# Patient Record
Sex: FEMALE | Race: WHITE | NOT HISPANIC OR LATINO | Employment: OTHER | URBAN - METROPOLITAN AREA
[De-identification: names, ages, dates, MRNs, and addresses within clinical notes are randomized per-mention and may not be internally consistent; named-entity substitution may affect disease eponyms.]

---

## 2015-08-04 LAB — CRC RECOMMENDATION EXT: NORMAL

## 2017-05-01 ENCOUNTER — HISTORICAL (OUTPATIENT)
Dept: ADMINISTRATIVE | Facility: HOSPITAL | Age: 62
End: 2017-05-01

## 2017-05-01 LAB
ABS NEUT (OLG): 4.07 X10(3)/MCL (ref 2.1–9.2)
ALBUMIN SERPL-MCNC: 3.3 GM/DL (ref 3.4–5)
ALBUMIN/GLOB SERPL: 0.8 {RATIO}
ALP SERPL-CCNC: 98 UNIT/L (ref 38–126)
ALT SERPL-CCNC: 32 UNIT/L (ref 12–78)
APPEARANCE, UA: ABNORMAL
AST SERPL-CCNC: 14 UNIT/L (ref 15–37)
BACTERIA SPEC CULT: ABNORMAL /HPF
BASOPHILS # BLD AUTO: 0 X10(3)/MCL (ref 0–0.2)
BASOPHILS NFR BLD AUTO: 1 %
BILIRUB SERPL-MCNC: 0.4 MG/DL (ref 0.2–1)
BILIRUB UR QL STRIP: NEGATIVE
BILIRUBIN DIRECT+TOT PNL SERPL-MCNC: 0.1 MG/DL (ref 0–0.2)
BILIRUBIN DIRECT+TOT PNL SERPL-MCNC: 0.3 MG/DL (ref 0–0.8)
BUN SERPL-MCNC: 12 MG/DL (ref 7–18)
CALCIUM SERPL-MCNC: 9 MG/DL (ref 8.5–10.1)
CHLORIDE SERPL-SCNC: 106 MMOL/L (ref 98–107)
CHOLEST SERPL-MCNC: 240 MG/DL (ref 0–200)
CHOLEST/HDLC SERPL: 4.4 {RATIO} (ref 0–4)
CO2 SERPL-SCNC: 30 MMOL/L (ref 21–32)
COLOR UR: YELLOW
CREAT SERPL-MCNC: 0.56 MG/DL (ref 0.55–1.02)
EOSINOPHIL # BLD AUTO: 0.2 X10(3)/MCL (ref 0–0.9)
EOSINOPHIL NFR BLD AUTO: 2 %
ERYTHROCYTE [DISTWIDTH] IN BLOOD BY AUTOMATED COUNT: 12.9 % (ref 11.5–17)
EST. AVERAGE GLUCOSE BLD GHB EST-MCNC: 85 MG/DL
GLOBULIN SER-MCNC: 4.1 GM/DL (ref 2.4–3.5)
GLUCOSE (UA): NEGATIVE
GLUCOSE SERPL-MCNC: 90 MG/DL (ref 74–106)
HBA1C MFR BLD: 4.6 % (ref 4.2–6.3)
HCT VFR BLD AUTO: 41.6 % (ref 37–47)
HDLC SERPL-MCNC: 54 MG/DL (ref 35–60)
HGB BLD-MCNC: 13.2 GM/DL (ref 12–16)
HGB UR QL STRIP: ABNORMAL
KETONES UR QL STRIP: NEGATIVE
LDLC SERPL CALC-MCNC: 143 MG/DL (ref 0–129)
LEUKOCYTE ESTERASE UR QL STRIP: ABNORMAL
LYMPHOCYTES # BLD AUTO: 3.4 X10(3)/MCL (ref 0.6–4.6)
LYMPHOCYTES NFR BLD AUTO: 41 %
MCH RBC QN AUTO: 28.9 PG (ref 27–31)
MCHC RBC AUTO-ENTMCNC: 31.7 GM/DL (ref 33–36)
MCV RBC AUTO: 91.2 FL (ref 80–94)
MONOCYTES # BLD AUTO: 0.6 X10(3)/MCL (ref 0.1–1.3)
MONOCYTES NFR BLD AUTO: 8 %
NEUTROPHILS # BLD AUTO: 4.07 X10(3)/MCL (ref 2.1–9.2)
NEUTROPHILS NFR BLD AUTO: 49 %
NITRITE UR QL STRIP: NEGATIVE
PH UR STRIP: 5.5 [PH] (ref 5–9)
PLATELET # BLD AUTO: 238 X10(3)/MCL (ref 130–400)
PMV BLD AUTO: 10.8 FL (ref 9.4–12.4)
POTASSIUM SERPL-SCNC: 4.1 MMOL/L (ref 3.5–5.1)
PROT SERPL-MCNC: 7.4 GM/DL (ref 6.4–8.2)
PROT UR QL STRIP: NEGATIVE
RBC # BLD AUTO: 4.56 X10(6)/MCL (ref 4.2–5.4)
RBC #/AREA URNS HPF: ABNORMAL /[HPF]
SODIUM SERPL-SCNC: 142 MMOL/L (ref 136–145)
SP GR UR STRIP: 1.02 (ref 1–1.03)
SQUAMOUS EPITHELIAL, UA: ABNORMAL
TRIGL SERPL-MCNC: 213 MG/DL (ref 30–150)
TSH SERPL-ACNC: 2.27 MIU/ML (ref 0.36–3.74)
UROBILINOGEN UR STRIP-ACNC: 0.2
VLDLC SERPL CALC-MCNC: 43 MG/DL
WBC # SPEC AUTO: 8.4 X10(3)/MCL (ref 4.5–11.5)
WBC #/AREA URNS HPF: 19 /HPF (ref 0–3)

## 2017-05-03 LAB — FINAL CULTURE: NO GROWTH

## 2017-11-06 ENCOUNTER — HISTORICAL (OUTPATIENT)
Dept: ADMINISTRATIVE | Facility: HOSPITAL | Age: 62
End: 2017-11-06

## 2017-11-06 LAB
ALBUMIN SERPL-MCNC: 3.4 GM/DL (ref 3.4–5)
ALBUMIN/GLOB SERPL: 0.9 {RATIO}
ALP SERPL-CCNC: 99 UNIT/L (ref 38–126)
ALT SERPL-CCNC: 27 UNIT/L (ref 12–78)
AST SERPL-CCNC: 14 UNIT/L (ref 15–37)
BILIRUB SERPL-MCNC: 0.4 MG/DL (ref 0.2–1)
BILIRUBIN DIRECT+TOT PNL SERPL-MCNC: 0.1 MG/DL (ref 0–0.2)
BILIRUBIN DIRECT+TOT PNL SERPL-MCNC: 0.3 MG/DL (ref 0–0.8)
BUN SERPL-MCNC: 13 MG/DL (ref 7–18)
CALCIUM SERPL-MCNC: 9.2 MG/DL (ref 8.5–10.1)
CHLORIDE SERPL-SCNC: 104 MMOL/L (ref 98–107)
CHOLEST SERPL-MCNC: 252 MG/DL (ref 0–200)
CHOLEST/HDLC SERPL: 4.3 {RATIO} (ref 0–4)
CO2 SERPL-SCNC: 27 MMOL/L (ref 21–32)
CREAT SERPL-MCNC: 0.62 MG/DL (ref 0.55–1.02)
GLOBULIN SER-MCNC: 3.9 GM/DL (ref 2.4–3.5)
GLUCOSE SERPL-MCNC: 89 MG/DL (ref 74–106)
HDLC SERPL-MCNC: 59 MG/DL (ref 35–60)
LDLC SERPL CALC-MCNC: 160 MG/DL (ref 0–129)
POTASSIUM SERPL-SCNC: 3.7 MMOL/L (ref 3.5–5.1)
PROT SERPL-MCNC: 7.3 GM/DL (ref 6.4–8.2)
SODIUM SERPL-SCNC: 141 MMOL/L (ref 136–145)
TRIGL SERPL-MCNC: 164 MG/DL (ref 30–150)
TSH SERPL-ACNC: 1.38 MIU/ML (ref 0.36–3.74)
VLDLC SERPL CALC-MCNC: 33 MG/DL

## 2018-04-26 ENCOUNTER — HISTORICAL (OUTPATIENT)
Dept: ADMINISTRATIVE | Facility: HOSPITAL | Age: 63
End: 2018-04-26

## 2018-04-26 LAB
ALBUMIN SERPL-MCNC: 3.1 GM/DL (ref 3.4–5)
ALBUMIN/GLOB SERPL: 0.8 RATIO (ref 1.1–2)
ALP SERPL-CCNC: 102 UNIT/L (ref 38–126)
ALT SERPL-CCNC: 23 UNIT/L (ref 12–78)
AST SERPL-CCNC: 14 UNIT/L (ref 15–37)
BILIRUB SERPL-MCNC: 0.8 MG/DL (ref 0.2–1)
BILIRUBIN DIRECT+TOT PNL SERPL-MCNC: 0.1 MG/DL (ref 0–0.5)
BILIRUBIN DIRECT+TOT PNL SERPL-MCNC: 0.7 MG/DL (ref 0–0.8)
BUN SERPL-MCNC: 11 MG/DL (ref 7–18)
CALCIUM SERPL-MCNC: 9 MG/DL (ref 8.5–10.1)
CHLORIDE SERPL-SCNC: 110 MMOL/L (ref 98–107)
CHOLEST SERPL-MCNC: 189 MG/DL (ref 0–200)
CHOLEST/HDLC SERPL: 3.7 {RATIO} (ref 0–4)
CO2 SERPL-SCNC: 30 MMOL/L (ref 21–32)
CREAT SERPL-MCNC: 0.55 MG/DL (ref 0.55–1.02)
GLOBULIN SER-MCNC: 4 GM/DL (ref 2.4–3.5)
GLUCOSE SERPL-MCNC: 85 MG/DL (ref 74–106)
HDLC SERPL-MCNC: 51 MG/DL (ref 35–60)
LDLC SERPL CALC-MCNC: 112 MG/DL (ref 0–129)
POTASSIUM SERPL-SCNC: 4 MMOL/L (ref 3.5–5.1)
PROT SERPL-MCNC: 7.1 GM/DL (ref 6.4–8.2)
SODIUM SERPL-SCNC: 144 MMOL/L (ref 136–145)
TRIGL SERPL-MCNC: 129 MG/DL (ref 30–150)
TSH SERPL-ACNC: 2.55 MIU/L (ref 0.36–3.74)
VLDLC SERPL CALC-MCNC: 26 MG/DL

## 2018-10-29 ENCOUNTER — HISTORICAL (OUTPATIENT)
Dept: ADMINISTRATIVE | Facility: HOSPITAL | Age: 63
End: 2018-10-29

## 2018-10-29 LAB
ABS NEUT (OLG): 4.53 X10(3)/MCL (ref 2.1–9.2)
ALBUMIN SERPL-MCNC: 3.1 GM/DL (ref 3.4–5)
ALBUMIN/GLOB SERPL: 0.8 {RATIO}
ALP SERPL-CCNC: 101 UNIT/L (ref 38–126)
ALT SERPL-CCNC: 26 UNIT/L (ref 12–78)
AST SERPL-CCNC: 14 UNIT/L (ref 15–37)
BASOPHILS # BLD AUTO: 0.1 X10(3)/MCL (ref 0–0.2)
BASOPHILS NFR BLD AUTO: 1 %
BILIRUB SERPL-MCNC: 0.4 MG/DL (ref 0.2–1)
BILIRUBIN DIRECT+TOT PNL SERPL-MCNC: 0.1 MG/DL (ref 0–0.2)
BILIRUBIN DIRECT+TOT PNL SERPL-MCNC: 0.3 MG/DL (ref 0–0.8)
BUN SERPL-MCNC: 12 MG/DL (ref 7–18)
CALCIUM SERPL-MCNC: 8.7 MG/DL (ref 8.5–10.1)
CHLORIDE SERPL-SCNC: 107 MMOL/L (ref 98–107)
CHOLEST SERPL-MCNC: 208 MG/DL (ref 0–200)
CHOLEST/HDLC SERPL: 4.4 {RATIO} (ref 0–4)
CO2 SERPL-SCNC: 28 MMOL/L (ref 21–32)
CREAT SERPL-MCNC: 0.63 MG/DL (ref 0.55–1.02)
EOSINOPHIL # BLD AUTO: 0.2 X10(3)/MCL (ref 0–0.9)
EOSINOPHIL NFR BLD AUTO: 2 %
ERYTHROCYTE [DISTWIDTH] IN BLOOD BY AUTOMATED COUNT: 13.2 % (ref 11.5–17)
GLOBULIN SER-MCNC: 4.1 GM/DL (ref 2.4–3.5)
GLUCOSE SERPL-MCNC: 94 MG/DL (ref 74–106)
HCT VFR BLD AUTO: 39.8 % (ref 37–47)
HDLC SERPL-MCNC: 47 MG/DL (ref 35–60)
HGB BLD-MCNC: 12.4 GM/DL (ref 12–16)
LDLC SERPL CALC-MCNC: 130 MG/DL (ref 0–129)
LYMPHOCYTES # BLD AUTO: 3 X10(3)/MCL (ref 0.6–4.6)
LYMPHOCYTES NFR BLD AUTO: 36 %
MCH RBC QN AUTO: 28.4 PG (ref 27–31)
MCHC RBC AUTO-ENTMCNC: 31.2 GM/DL (ref 33–36)
MCV RBC AUTO: 91.1 FL (ref 80–94)
MONOCYTES # BLD AUTO: 0.7 X10(3)/MCL (ref 0.1–1.3)
MONOCYTES NFR BLD AUTO: 8 %
NEUTROPHILS # BLD AUTO: 4.53 X10(3)/MCL (ref 2.1–9.2)
NEUTROPHILS NFR BLD AUTO: 53 %
PLATELET # BLD AUTO: 239 X10(3)/MCL (ref 130–400)
PMV BLD AUTO: 10.6 FL (ref 9.4–12.4)
POTASSIUM SERPL-SCNC: 4.2 MMOL/L (ref 3.5–5.1)
PROT SERPL-MCNC: 7.2 GM/DL (ref 6.4–8.2)
RBC # BLD AUTO: 4.37 X10(6)/MCL (ref 4.2–5.4)
SODIUM SERPL-SCNC: 143 MMOL/L (ref 136–145)
TRIGL SERPL-MCNC: 157 MG/DL (ref 30–150)
TSH SERPL-ACNC: 1.88 MIU/L (ref 0.36–3.74)
VLDLC SERPL CALC-MCNC: 31 MG/DL
WBC # SPEC AUTO: 8.5 X10(3)/MCL (ref 4.5–11.5)

## 2019-01-21 ENCOUNTER — HISTORICAL (OUTPATIENT)
Dept: ADMINISTRATIVE | Facility: HOSPITAL | Age: 64
End: 2019-01-21

## 2019-01-21 LAB
CRP SERPL HS-MCNC: 4.88 MG/L (ref 0–3)
ERYTHROCYTE [DISTWIDTH] IN BLOOD BY AUTOMATED COUNT: 13.2 % (ref 11.5–17)
ERYTHROCYTE [SEDIMENTATION RATE] IN BLOOD: 23 MM/HR (ref 0–20)
HCT VFR BLD AUTO: 42.9 % (ref 37–47)
HGB BLD-MCNC: 13.5 GM/DL (ref 12–16)
MCH RBC QN AUTO: 28.5 PG (ref 27–31)
MCHC RBC AUTO-ENTMCNC: 31.5 GM/DL (ref 33–36)
MCV RBC AUTO: 90.7 FL (ref 80–94)
PLATELET # BLD AUTO: 238 X10(3)/MCL (ref 130–400)
PMV BLD AUTO: 11 FL (ref 9.4–12.4)
RBC # BLD AUTO: 4.73 X10(6)/MCL (ref 4.2–5.4)
WBC # SPEC AUTO: 10.6 X10(3)/MCL (ref 4.5–11.5)

## 2019-04-09 ENCOUNTER — HISTORICAL (OUTPATIENT)
Dept: ADMINISTRATIVE | Facility: HOSPITAL | Age: 64
End: 2019-04-09

## 2019-04-09 LAB
ABS NEUT (OLG): 5.97 X10(3)/MCL (ref 2.1–9.2)
ALBUMIN SERPL-MCNC: 3.3 GM/DL (ref 3.4–5)
ALBUMIN/GLOB SERPL: 0.8 {RATIO}
ALP SERPL-CCNC: 107 UNIT/L (ref 38–126)
ALT SERPL-CCNC: 29 UNIT/L (ref 12–78)
AST SERPL-CCNC: 17 UNIT/L (ref 15–37)
BASOPHILS # BLD AUTO: 0.1 X10(3)/MCL (ref 0–0.2)
BASOPHILS NFR BLD AUTO: 1 %
BILIRUB SERPL-MCNC: 0.4 MG/DL (ref 0.2–1)
BILIRUBIN DIRECT+TOT PNL SERPL-MCNC: 0.1 MG/DL (ref 0–0.2)
BILIRUBIN DIRECT+TOT PNL SERPL-MCNC: 0.3 MG/DL (ref 0–0.8)
BUN SERPL-MCNC: 14 MG/DL (ref 7–18)
CALCIUM SERPL-MCNC: 9.2 MG/DL (ref 8.5–10.1)
CHLORIDE SERPL-SCNC: 106 MMOL/L (ref 98–107)
CO2 SERPL-SCNC: 31 MMOL/L (ref 21–32)
CREAT SERPL-MCNC: 0.69 MG/DL (ref 0.55–1.02)
EOSINOPHIL # BLD AUTO: 0.2 X10(3)/MCL (ref 0–0.9)
EOSINOPHIL NFR BLD AUTO: 2 %
ERYTHROCYTE [DISTWIDTH] IN BLOOD BY AUTOMATED COUNT: 13.3 % (ref 11.5–17)
GLOBULIN SER-MCNC: 4.1 GM/DL (ref 2.4–3.5)
GLUCOSE SERPL-MCNC: 80 MG/DL (ref 74–106)
HCT VFR BLD AUTO: 42.2 % (ref 37–47)
HGB BLD-MCNC: 13.2 GM/DL (ref 12–16)
LYMPHOCYTES # BLD AUTO: 3.8 X10(3)/MCL (ref 0.6–4.6)
LYMPHOCYTES NFR BLD AUTO: 35 %
MCH RBC QN AUTO: 28.6 PG (ref 27–31)
MCHC RBC AUTO-ENTMCNC: 31.3 GM/DL (ref 33–36)
MCV RBC AUTO: 91.3 FL (ref 80–94)
MONOCYTES # BLD AUTO: 0.8 X10(3)/MCL (ref 0.1–1.3)
MONOCYTES NFR BLD AUTO: 7 %
NEUTROPHILS # BLD AUTO: 5.97 X10(3)/MCL (ref 2.1–9.2)
NEUTROPHILS NFR BLD AUTO: 55 %
PLATELET # BLD AUTO: 258 X10(3)/MCL (ref 130–400)
PMV BLD AUTO: 10.7 FL (ref 9.4–12.4)
POTASSIUM SERPL-SCNC: 4.2 MMOL/L (ref 3.5–5.1)
PROT SERPL-MCNC: 7.4 GM/DL (ref 6.4–8.2)
RBC # BLD AUTO: 4.62 X10(6)/MCL (ref 4.2–5.4)
SODIUM SERPL-SCNC: 142 MMOL/L (ref 136–145)
WBC # SPEC AUTO: 10.8 X10(3)/MCL (ref 4.5–11.5)

## 2019-06-12 ENCOUNTER — HISTORICAL (OUTPATIENT)
Dept: ADMINISTRATIVE | Facility: HOSPITAL | Age: 64
End: 2019-06-12

## 2020-01-27 ENCOUNTER — HISTORICAL (OUTPATIENT)
Dept: ADMINISTRATIVE | Facility: HOSPITAL | Age: 65
End: 2020-01-27

## 2020-10-22 ENCOUNTER — HISTORICAL (OUTPATIENT)
Dept: ADMINISTRATIVE | Facility: HOSPITAL | Age: 65
End: 2020-10-22

## 2020-10-26 ENCOUNTER — HISTORICAL (OUTPATIENT)
Dept: ADMINISTRATIVE | Facility: HOSPITAL | Age: 65
End: 2020-10-26

## 2020-11-02 ENCOUNTER — HISTORICAL (OUTPATIENT)
Dept: ADMINISTRATIVE | Facility: HOSPITAL | Age: 65
End: 2020-11-02

## 2020-12-02 ENCOUNTER — HISTORICAL (OUTPATIENT)
Dept: ADMINISTRATIVE | Facility: HOSPITAL | Age: 65
End: 2020-12-02

## 2021-01-13 ENCOUNTER — HISTORICAL (OUTPATIENT)
Dept: ADMINISTRATIVE | Facility: HOSPITAL | Age: 66
End: 2021-01-13

## 2021-01-15 ENCOUNTER — HISTORICAL (OUTPATIENT)
Dept: LAB | Facility: HOSPITAL | Age: 66
End: 2021-01-15

## 2021-01-15 LAB
ABS NEUT (OLG): 5.55 X10(3)/MCL (ref 2.1–9.2)
ALBUMIN SERPL-MCNC: 3.5 GM/DL (ref 3.4–4.8)
ALBUMIN/GLOB SERPL: 0.9 RATIO (ref 1.1–2)
ALP SERPL-CCNC: 93 UNIT/L (ref 40–150)
ALT SERPL-CCNC: 15 UNIT/L (ref 0–55)
AST SERPL-CCNC: 15 UNIT/L (ref 5–34)
BASOPHILS # BLD AUTO: 0.06 X10(3)/MCL (ref 0–0.2)
BASOPHILS NFR BLD AUTO: 0.6 % (ref 0–1)
BILIRUB SERPL-MCNC: 0.4 MG/DL (ref 0.2–1.2)
BILIRUBIN DIRECT+TOT PNL SERPL-MCNC: 0.2 MG/DL (ref 0–0.5)
BILIRUBIN DIRECT+TOT PNL SERPL-MCNC: 0.2 MG/DL (ref 0–0.8)
BUN SERPL-MCNC: 11.2 MG/DL (ref 9.8–20.1)
CALCIUM SERPL-MCNC: 9.3 MG/DL (ref 8.4–10.2)
CHLORIDE SERPL-SCNC: 104 MMOL/L (ref 98–107)
CHOLEST SERPL-MCNC: 224 MG/DL
CHOLEST/HDLC SERPL: 4 {RATIO} (ref 0–5)
CO2 SERPL-SCNC: 28 MMOL/L (ref 23–31)
CREAT SERPL-MCNC: 0.75 MG/DL (ref 0.57–1.11)
EOSINOPHIL # BLD AUTO: 0.14 X10(3)/MCL (ref 0–0.9)
EOSINOPHIL NFR BLD AUTO: 1.4 % (ref 0–6.4)
ERYTHROCYTE [DISTWIDTH] IN BLOOD BY AUTOMATED COUNT: 13.3 % (ref 11.5–17)
GLOBULIN SER-MCNC: 4 GM/DL (ref 2.4–3.5)
GLUCOSE SERPL-MCNC: 98 MG/DL (ref 82–115)
HCT VFR BLD AUTO: 42.6 % (ref 37–47)
HDLC SERPL-MCNC: 53 MG/DL (ref 40–60)
HGB BLD-MCNC: 13.7 GM/DL (ref 12–16)
IMM GRANULOCYTES # BLD AUTO: 0.02 10*3/UL (ref 0–0.02)
IMM GRANULOCYTES NFR BLD AUTO: 0.2 % (ref 0–0.43)
LDLC SERPL CALC-MCNC: 149 MG/DL (ref 50–140)
LYMPHOCYTES # BLD AUTO: 3.24 X10(3)/MCL (ref 0.6–4.6)
LYMPHOCYTES NFR BLD AUTO: 33.3 % (ref 16–44)
MCH RBC QN AUTO: 28.8 PG (ref 27–31)
MCHC RBC AUTO-ENTMCNC: 32.2 GM/DL (ref 33–36)
MCV RBC AUTO: 89.5 FL (ref 80–94)
MONOCYTES # BLD AUTO: 0.71 X10(3)/MCL (ref 0.1–1.3)
MONOCYTES NFR BLD AUTO: 7.3 % (ref 4–12.1)
NEUTROPHILS # BLD AUTO: 5.55 X10(3)/MCL (ref 2.1–9.2)
NEUTROPHILS NFR BLD AUTO: 57.2 % (ref 43–73)
NRBC BLD AUTO-RTO: 0 % (ref 0–0.2)
PLATELET # BLD AUTO: 262 X10(3)/MCL (ref 130–400)
PMV BLD AUTO: 10.9 FL (ref 7.4–10.4)
POTASSIUM SERPL-SCNC: 4 MMOL/L (ref 3.5–5.1)
PROT SERPL-MCNC: 7.5 GM/DL (ref 5.8–7.6)
RBC # BLD AUTO: 4.76 X10(6)/MCL (ref 4.2–5.4)
SODIUM SERPL-SCNC: 142 MMOL/L (ref 136–145)
TRIGL SERPL-MCNC: 111 MG/DL (ref 0–150)
TSH SERPL-ACNC: 4.7 UIU/ML (ref 0.35–4.94)
VLDLC SERPL CALC-MCNC: 22 MG/DL
WBC # SPEC AUTO: 9.7 X10(3)/MCL (ref 4.5–11.5)

## 2021-06-11 ENCOUNTER — HISTORICAL (OUTPATIENT)
Dept: ADMINISTRATIVE | Facility: HOSPITAL | Age: 66
End: 2021-06-11

## 2021-06-24 ENCOUNTER — HISTORICAL (OUTPATIENT)
Dept: BARIATRICS | Facility: HOSPITAL | Age: 66
End: 2021-06-24

## 2021-07-13 ENCOUNTER — HISTORICAL (OUTPATIENT)
Dept: LAB | Facility: HOSPITAL | Age: 66
End: 2021-07-13

## 2021-07-13 LAB
ABS NEUT (OLG): 4.79 X10(3)/MCL (ref 2.1–9.2)
ALBUMIN SERPL-MCNC: 3.4 GM/DL (ref 3.4–4.8)
ALBUMIN/GLOB SERPL: 0.8 RATIO (ref 1.1–2)
ALP SERPL-CCNC: 87 UNIT/L (ref 40–150)
ALT SERPL-CCNC: 22 UNIT/L (ref 0–55)
AST SERPL-CCNC: 18 UNIT/L (ref 5–34)
BASOPHILS # BLD AUTO: 0.08 X10(3)/MCL (ref 0–0.2)
BASOPHILS NFR BLD AUTO: 0.9 % (ref 0–1)
BILIRUB SERPL-MCNC: 0.3 MG/DL (ref 0.2–1.2)
BILIRUBIN DIRECT+TOT PNL SERPL-MCNC: <0.1 MG/DL (ref 0–0.5)
BILIRUBIN DIRECT+TOT PNL SERPL-MCNC: >0.2 MG/DL (ref 0–0.8)
BUN SERPL-MCNC: 11 MG/DL (ref 9.8–20.1)
CALCIUM SERPL-MCNC: 9.5 MG/DL (ref 8.4–10.2)
CHLORIDE SERPL-SCNC: 105 MMOL/L (ref 98–107)
CHOLEST SERPL-MCNC: 194 MG/DL
CHOLEST/HDLC SERPL: 5 {RATIO} (ref 0–5)
CO2 SERPL-SCNC: 27 MMOL/L (ref 23–31)
CREAT SERPL-MCNC: 0.76 MG/DL (ref 0.57–1.11)
EOSINOPHIL # BLD AUTO: 0.18 X10(3)/MCL (ref 0–0.9)
EOSINOPHIL NFR BLD AUTO: 1.9 % (ref 0–6.4)
ERYTHROCYTE [DISTWIDTH] IN BLOOD BY AUTOMATED COUNT: 13 % (ref 11.5–17)
GLOBULIN SER-MCNC: 4.3 GM/DL (ref 2.4–3.5)
GLUCOSE SERPL-MCNC: 88 MG/DL (ref 82–115)
HCT VFR BLD AUTO: 42 % (ref 37–47)
HDLC SERPL-MCNC: 40 MG/DL (ref 40–60)
HGB BLD-MCNC: 13.4 GM/DL (ref 12–16)
IMM GRANULOCYTES # BLD AUTO: 0.02 10*3/UL (ref 0–0.02)
IMM GRANULOCYTES NFR BLD AUTO: 0.2 % (ref 0–0.43)
LDLC SERPL CALC-MCNC: 99 MG/DL (ref 50–140)
LYMPHOCYTES # BLD AUTO: 3.53 X10(3)/MCL (ref 0.6–4.6)
LYMPHOCYTES NFR BLD AUTO: 37.9 % (ref 16–44)
MCH RBC QN AUTO: 28.9 PG (ref 27–31)
MCHC RBC AUTO-ENTMCNC: 31.9 GM/DL (ref 33–36)
MCV RBC AUTO: 90.7 FL (ref 80–94)
MONOCYTES # BLD AUTO: 0.72 X10(3)/MCL (ref 0.1–1.3)
MONOCYTES NFR BLD AUTO: 7.7 % (ref 4–12.1)
NEUTROPHILS # BLD AUTO: 4.79 X10(3)/MCL (ref 2.1–9.2)
NEUTROPHILS NFR BLD AUTO: 51.4 % (ref 43–73)
NRBC BLD AUTO-RTO: 0 % (ref 0–0.2)
PLATELET # BLD AUTO: 264 X10(3)/MCL (ref 130–400)
PMV BLD AUTO: 11.2 FL (ref 7.4–10.4)
POTASSIUM SERPL-SCNC: 4 MMOL/L (ref 3.5–5.1)
PROT SERPL-MCNC: 7.7 GM/DL (ref 5.8–7.6)
RBC # BLD AUTO: 4.63 X10(6)/MCL (ref 4.2–5.4)
SODIUM SERPL-SCNC: 141 MMOL/L (ref 136–145)
TRIGL SERPL-MCNC: 275 MG/DL (ref 0–150)
TSH SERPL-ACNC: 3.32 UIU/ML (ref 0.35–4.94)
VLDLC SERPL CALC-MCNC: 55 MG/DL
WBC # SPEC AUTO: 9.3 X10(3)/MCL (ref 4.5–11.5)

## 2021-07-28 ENCOUNTER — HISTORICAL (OUTPATIENT)
Dept: BARIATRICS | Facility: HOSPITAL | Age: 66
End: 2021-07-28

## 2021-08-04 ENCOUNTER — HISTORICAL (OUTPATIENT)
Dept: ADMINISTRATIVE | Facility: HOSPITAL | Age: 66
End: 2021-08-04

## 2021-08-19 ENCOUNTER — HISTORICAL (OUTPATIENT)
Dept: BARIATRICS | Facility: HOSPITAL | Age: 66
End: 2021-08-19

## 2021-08-23 ENCOUNTER — HISTORICAL (OUTPATIENT)
Dept: RADIOLOGY | Facility: HOSPITAL | Age: 66
End: 2021-08-23

## 2021-09-16 ENCOUNTER — HISTORICAL (OUTPATIENT)
Dept: BARIATRICS | Facility: HOSPITAL | Age: 66
End: 2021-09-16

## 2021-10-11 ENCOUNTER — HISTORICAL (OUTPATIENT)
Dept: ADMINISTRATIVE | Facility: HOSPITAL | Age: 66
End: 2021-10-11

## 2022-01-04 ENCOUNTER — HISTORICAL (OUTPATIENT)
Dept: BARIATRICS | Facility: HOSPITAL | Age: 67
End: 2022-01-04

## 2022-01-10 ENCOUNTER — HISTORICAL (OUTPATIENT)
Dept: LAB | Facility: HOSPITAL | Age: 67
End: 2022-01-10

## 2022-01-10 LAB
ABS NEUT (OLG): 5.75 X10(3)/MCL (ref 2.1–9.2)
ALBUMIN SERPL-MCNC: 3.6 GM/DL (ref 3.4–4.8)
ALBUMIN/GLOB SERPL: 0.9 RATIO (ref 1.1–2)
ALP SERPL-CCNC: 91 UNIT/L (ref 40–150)
ALT SERPL-CCNC: 15 UNIT/L (ref 0–55)
APTT PPP: 34.7 SEC (ref 23.4–34.9)
AST SERPL-CCNC: 17 UNIT/L (ref 5–34)
BILIRUB SERPL-MCNC: 0.5 MG/DL (ref 0.2–1.2)
BILIRUBIN DIRECT+TOT PNL SERPL-MCNC: 0.2 MG/DL (ref 0–0.5)
BILIRUBIN DIRECT+TOT PNL SERPL-MCNC: 0.3 MG/DL (ref 0–0.8)
BUN SERPL-MCNC: 10.2 MG/DL (ref 9.8–20.1)
CALCIUM SERPL-MCNC: 9.4 MG/DL (ref 8.4–10.2)
CHLORIDE SERPL-SCNC: 104 MMOL/L (ref 98–107)
CO2 SERPL-SCNC: 30 MMOL/L (ref 23–31)
CREAT SERPL-MCNC: 0.68 MG/DL (ref 0.57–1.11)
ERYTHROCYTE [DISTWIDTH] IN BLOOD BY AUTOMATED COUNT: 13.2 % (ref 11.5–17)
GLOBULIN SER-MCNC: 3.9 GM/DL (ref 2.4–3.5)
GLUCOSE SERPL-MCNC: 85 MG/DL (ref 82–115)
HCT VFR BLD AUTO: 43.2 % (ref 37–47)
HGB BLD-MCNC: 13.7 GM/DL (ref 12–16)
MCH RBC QN AUTO: 28.8 PG (ref 27–31)
MCHC RBC AUTO-ENTMCNC: 31.7 GM/DL (ref 33–36)
MCV RBC AUTO: 90.8 FL (ref 80–94)
NRBC BLD AUTO-RTO: 0 % (ref 0–0.2)
PLATELET # BLD AUTO: 268 X10(3)/MCL (ref 130–400)
PMV BLD AUTO: 10.7 FL (ref 7.4–10.4)
POTASSIUM SERPL-SCNC: 4.1 MMOL/L (ref 3.5–5.1)
PROT SERPL-MCNC: 7.5 GM/DL (ref 5.8–7.6)
RBC # BLD AUTO: 4.76 X10(6)/MCL (ref 4.2–5.4)
SODIUM SERPL-SCNC: 143 MMOL/L (ref 136–145)
WBC # SPEC AUTO: 10.4 X10(3)/MCL (ref 4.5–11.5)

## 2022-01-19 ENCOUNTER — HISTORICAL (OUTPATIENT)
Dept: BARIATRICS | Facility: HOSPITAL | Age: 67
End: 2022-01-19

## 2022-01-20 ENCOUNTER — HISTORICAL (OUTPATIENT)
Dept: PREADMISSION TESTING | Facility: HOSPITAL | Age: 67
End: 2022-01-20

## 2022-01-20 LAB — SARS-COV-2 RNA RESP QL NAA+PROBE: NOT DETECTED

## 2022-02-07 ENCOUNTER — HISTORICAL (OUTPATIENT)
Dept: BARIATRICS | Facility: HOSPITAL | Age: 67
End: 2022-02-07

## 2022-03-22 ENCOUNTER — HISTORICAL (OUTPATIENT)
Dept: BARIATRICS | Facility: HOSPITAL | Age: 67
End: 2022-03-22

## 2022-04-10 ENCOUNTER — HISTORICAL (OUTPATIENT)
Dept: ADMINISTRATIVE | Facility: HOSPITAL | Age: 67
End: 2022-04-10
Payer: MEDICARE

## 2022-04-29 VITALS
DIASTOLIC BLOOD PRESSURE: 77 MMHG | HEIGHT: 59 IN | BODY MASS INDEX: 43.75 KG/M2 | OXYGEN SATURATION: 97 % | SYSTOLIC BLOOD PRESSURE: 138 MMHG | WEIGHT: 217 LBS

## 2022-04-29 NOTE — H&P
Patient:   Varsha Rg            MRN: [id number]            FIN: [id number]               Age:   66 years     Sex:  Female     :  1955   Associated Diagnoses:   GERD (gastroesophageal reflux disease)   Author:   Jake Elmore MD      Basic Information   Admit information:  Presents for EGD to evaluate anatomy  c/o GERD .       Health Status   Allergies:    Allergic Reactions (Selected)  Severity Not Documented  Levaquin- Tachycardia.  Lortab- No reactions were documented.  Penicillins- No reactions were documented.  Zithromax- No reactions were documented.   Current medications:    Medications (1) Active  Scheduled: (0)  Continuous: (1)  Lactated Ringers Injection intravenous solution 1,000 mL  1,000 mL, IV, 75 mL/hr  PRN: (0)        Histories   Past Medical History:    No active or resolved past medical history items have been selected or recorded.   Social History        Social & Psychosocial Habits    Alcohol  2015  Use: Current    Employment/School  2015  Status: Retired    Exercise  2015  Duration (average number of minutes): 0    Home/Environment  2015  Lives with: Spouse    Nutrition/Health  2015  Type of diet: Calorie restricted    Sexual  2015  Sexually active: Yes    Substance Use  2015  Use: Never    Tobacco  2021  Use: Never (less than 100 in l    Patient Wants Consult For Cessation Counseling N/A    2021  Use: Never (less than 100 in l    Patient Wants Consult For Cessation Counseling N/A    Abuse/Neglect  2021  SHX Any signs of abuse or neglect No    Feels unsafe at home: No    Safe place to go: Yes    2021  SHX Any signs of abuse or neglect No  .        Physical Examination   General:  Alert and oriented.    HENT:  Normocephalic.    Neck:  Supple.    Respiratory:  Lungs are clear to auscultation.    Cardiovascular:  Normal rate.    Gastrointestinal:       Abdomen: Obese.    Genitourinary:  No costovertebral  angle tenderness.    Musculoskeletal:  Normal range of motion.    Integumentary:  Warm.    Neurologic:  Alert.       Review / Management   Results review:     No qualifying data available.       Impression and Plan   Diagnosis     GERD (gastroesophageal reflux disease) (GJJ05-JK K21.9).       EGD

## 2022-04-29 NOTE — OP NOTE
Patient:   Varsha Rg            MRN: [id number]            FIN: [id number]               Age:   66 years     Sex:  Female     :  1955   Associated Diagnoses:   HEARTBURN   Author:   Jake Elmore MD      Operative Note   Operative Information   Procedures Performed: Procedure Code   78038- EGD FLEX TRANSORAL DX W/BRUSHING/WASHING (None) on 2021 at 66 Years.  Comments:  2021 7:34 CDT - Jose NGUYỄN, Jaci MOTT  auto-populated from documented surgical case, Esophagogastroduodenoscopy.     Preoperative Diagnosis: HEARTBURN ([id number] R12).     Postoperative Diagnosis: HEARTBURN ([id number]-RF R12).     Surgeon: Jake Elmore MD.     Anesthesia: MAC.     Description of Procedure/Findings/    Complications: Patient was taken to the OR.  The patient's throat was aneshtetized.  MAC was utilized for anesthesia.   was easily passed.  Findings were as follows:  -Oropharynx: normal  -Airway: normal  -Esophagus: normal  -Stomach: normal mucosa   -Duodenum: normal.     Esimated blood loss: No blood loss.     Complications: None.

## 2022-05-02 ENCOUNTER — TELEPHONE (OUTPATIENT)
Dept: INTERNAL MEDICINE | Facility: CLINIC | Age: 67
End: 2022-05-02
Payer: MEDICARE

## 2022-05-02 DIAGNOSIS — R30.0 DYSURIA: Primary | ICD-10-CM

## 2022-05-03 ENCOUNTER — TELEPHONE (OUTPATIENT)
Dept: INTERNAL MEDICINE | Facility: CLINIC | Age: 67
End: 2022-05-03

## 2022-05-03 ENCOUNTER — LAB VISIT (OUTPATIENT)
Dept: LAB | Facility: HOSPITAL | Age: 67
End: 2022-05-03
Attending: INTERNAL MEDICINE
Payer: MEDICARE

## 2022-05-03 DIAGNOSIS — R30.0 DYSURIA: ICD-10-CM

## 2022-05-03 DIAGNOSIS — R30.0 DYSURIA: Primary | ICD-10-CM

## 2022-05-03 LAB
APPEARANCE UR: ABNORMAL
BACTERIA #/AREA URNS AUTO: ABNORMAL /HPF
BILIRUB UR QL STRIP.AUTO: ABNORMAL
COLOR UR AUTO: ABNORMAL
GLUCOSE UR QL STRIP.AUTO: ABNORMAL
KETONES UR QL STRIP.AUTO: ABNORMAL
LEUKOCYTE ESTERASE UR QL STRIP.AUTO: ABNORMAL
NITRITE UR QL STRIP.AUTO: ABNORMAL
PH UR STRIP.AUTO: 5 [PH]
PROT UR QL STRIP.AUTO: ABNORMAL
RBC #/AREA URNS AUTO: 10 /HPF
RBC UR QL AUTO: ABNORMAL
SP GR UR STRIP.AUTO: 1.02 (ref 1–1.03)
SQUAMOUS #/AREA URNS AUTO: ABNORMAL /LPF
UROBILINOGEN UR STRIP-ACNC: ABNORMAL
WBC #/AREA URNS AUTO: 200 /HPF

## 2022-05-03 PROCEDURE — 81003 URINALYSIS AUTO W/O SCOPE: CPT

## 2022-05-03 PROCEDURE — 81015 MICROSCOPIC EXAM OF URINE: CPT | Mod: XB

## 2022-05-04 NOTE — HISTORICAL [facility]ER
This is a historical note converted from Reyna. Formatting and pictures may have been removed.  Please reference Reyna for original formatting and attached multimedia. Chief Complaint  Pt is here for left wrist fx/fell @ [place] & went to [facility] ER 10/21/20  History of Present Illness  Patient is a 65-year-old female that ambulates with a walker. ?She?tripped and fell onto an outstretched left hand in the [employer] lot yesterday.? She went to the emergency room where she was?treated?with?a splint for her left?acute distal radius fracture. ?She was given follow-up with me this morning.? She denies any numbness or tingling. ?She denies any other injury. ?She denies any dizziness or loss of consciousness.  Review of Systems  Systemic: No fever, no chills, and no recent weight change.  Head: No headache - frequent.  Eyes: No vision problems.  Otolarnygeal: No hearing loss, no earache, no epistaxis, no hoarseness, and no tooth pain. Gums normal.  Cardiovascular: No chest pain or discomfort and no palpitations.  Pulmonary: No pulmonary symptoms - difficulty sleeping, no dyspnea, and cough not worse in the morning.  Gastrointestinal: Appetite not decreased. No dysphagia and no constant eructation. No nausea, no vomiting, no abdominal pain, no hematochezia, and no loose/mushy stools - frequent. No constipation - frequent.  Genitourinary: No genitourinary symptoms - Getting up every night to urinate and no increase in urinary frequency. No urinary hesitancy. No urinary loss of control - difficulty stopping urination and no burning sensation during urination.  Musculoskeletal: No calf muscle cramps and no localized soft tissue swelling of the ankle.  Neurological: No fainting and no convulsions.  Psychological: Not feeling nervous tension, not feeling nervous from exhaustion, and no depression.  Skin: No rash. Previous history of no ulcers.  ?  Physical Exam  Vitals & Measurements  T:?96.8? ?F (Oral)?  HR:?74(Peripheral)? BP:?136/69?  HT:?148.00?cm? WT:?98.600?kg? BMI:?45.01?  Left upper extremity exam:  Normal capillary refill of less than 3 seconds  Normal sensation  No numbness  Intact?flexion and extension of all 5 fingers  Tenderness over the distal radius?and distal ulna  No hand tenderness  Mild swelling  Patient is in a splint that is well fitting  No elbow or shoulder tenderness  Normal range of motion of the elbow and shoulder  Radiographs?of the?left wrist taken today show?a left distal radius fracture with acceptable?alignment.? The ulnar styloid fracture appears to be an old injury.  Assessment/Plan  1.?Distal radius fracture, left?S52.502A  ?Plan as of now is for nonoperative treatment of her left distal radius fracture because the alignment?of the fracture today is acceptable. ?I will see her back on Monday for repeat radiographs. ?If?there is no?change for the worse in position of the?fracture?then the plan will be for cast application. ?If?fracture?displaces further?then?we will have a surgical discussion.  Ordered:  Clinic Follow up, *Est. 10/26/20 3:00:00 CDT, Order for future visit, Distal radius fracture, left, [facility]  Office/Outpatient Visit Level 3 Established [id number] [facility], Distal radius fracture, left, [facility], 10/22/20 9:09:00 CDT  XR Wrist Left Minimum 3 Views, Routine, *Est. 10/26/20 3:00:00 CDT, None, Ambulatory, Rad Type, Order for future visit, Distal radius fracture, left, Not Scheduled, *Est. 10/26/20 3:00:00 CDT  ?  Orders:  XR Wrist Left Minimum 3 Views, Routine, 10/22/20 8:58:00 CDT, None, Ambulatory, Rad Type, Wrist fracture, left, Not Scheduled, 10/22/20 8:58:00 CDT  Referrals  Clinic Follow up, *Est. 10/26/20 3:00:00 CDT, Order for future visit, Distal radius fracture, left, [facility]   Problem List/Past Medical History  Ongoing  Distal radius fracture, left  Essential hypertension(  Confirmed  )  History of arthroplasty of right knee  Hypothyroidism(   Confirmed  )  Morbid obesity  Obesity(  Probable Diagnosis  )  Osteoarthritis  Osteoarthritis of left knee  Patellar tendonitis of right knee  Squamous cell carcinoma  Historical  No qualifying data  Procedure/Surgical History  Release of right trigger finger (11/06/2019)  Release of left trigger finger (09/23/2019)  Arthroscopy of knee with medial meniscus repair (03/17/2017)  Excision of squamous cell carcinoma (01/17/2017)  Pap smear and HPV cotesting (08/01/2016)  Colonoscopy (01/01/2015)  Arthroscopy of knee with medial and lateral meniscus repair  Excision of squamous cell carcinoma   Medications  albuterol 2.5 mg/3 mL (0.083%) inhalation solution  furosemide 20 mg oral tablet, See Instructions  irbesartan 75 mg oral tablet, 75 mg= 1 tab(s), Oral, Daily,? ?Still taking, not as prescribed: every 3 days.  KlonoPIN 2 mg oral tablet, 4 mg= 2 tab(s), Oral, Once a day (at bedtime), 2 refills  Lotrisone 1%-0.05% topical cream, 1 julian, TOP, BID  meloxicam 7.5 mg oral tablet, 7.5 mg= 1 tab(s), Oral, Daily,? ?Not Taking, Completed Rx: Last Dose Date/Time Unknown. done  Mobic 7.5 mg oral tablet, 7.5 mg= 1 tab(s), Oral, BID, 5 refills,? ?Not taking  Mobic 7.5 mg oral tablet, 7.5 mg= 1 tab(s), Oral, Daily,? ?Not taking  Protonix 40 mg ORAL enteric coated tablet, 40 mg= 1 tab(s), Oral, Daily, 6 refills  Synthroid 88 mcg (0.088 mg) oral tablet, See Instructions, 2 refills,? ?Not taking  traMADol 50 mg oral tablet, 50 mg= 1 tab(s), Oral, q8hr, PRN  VIT D 50,000 IU D2 (ERGO) CAPS (RX), See Instructions, 3 refills  Voltaren 1% topical gel, 2 gm, TOP, QID, 5 refills  Allergies  Levaquin?(TACHYCARDIA)  Lortab  Zithromax  penicillins  Social History  Abuse/Neglect  No, No, Yes, 10/22/2020  Alcohol  Current, 04/22/2015  Employment/School  Retired, 04/22/2015  Exercise  Exercise duration: 0., 04/22/2015  Home/Environment  Lives with Spouse., 04/22/2015  Nutrition/Health  Calorie restricted, 04/22/2015  Sexual  Sexually active:  Yes., 04/22/2015  Substance Use  Never, 04/22/2015  Tobacco  Never (less than 100 in lifetime), N/A, 10/22/2020  Family History  Unable to obtain family history  Health Maintenance  Health Maintenance  ???Pending?(in the next year)  ??? ??OverDue  ??? ? ? ?Tetanus Vaccine due??01/03/18??and every 10??year(s)  ??? ? ? ?Influenza Vaccine due??10/01/19??and every 1??day(s)  ??? ? ? ?Bone Density Screening due??12/18/19??and every 1??day(s)  ??? ? ? ?Advance Directive due??01/02/20??and every 1??year(s)  ??? ? ? ?Cognitive Screening due??01/02/20??and every 1??year(s)  ??? ? ? ?Aspirin Therapy for CVD Prevention due??04/09/20??and every 1??year(s)  ??? ? ? ?Hypertension Management-Education due??04/09/20??and every 1??year(s)  ??? ? ? ?ADL Screening due??04/09/20??and every 1??year(s)  ??? ??Due?  ??? ? ? ?Coronary Artery Disease Maintenance-Antiplatelet Agent Prescribed due??10/22/20??and every?  ??? ? ? ?Coronary Artery Disease Maintenance-Lipid Lowering Therapy due??10/22/20??and every?  ??? ? ? ?Pneumococcal Vaccine due??10/22/20??and every?  ??? ? ? ?Zoster Vaccine due??10/22/20??and every?  ??? ??Near Due?  ??? ? ? ?Medicare Annual Wellness Exam near due??12/17/20??and every 1??year(s)  ??? ??Due In Future?  ??? ? ? ?Hypertension Management-BMP not due until??12/12/20??and every 1??year(s)  ??? ? ? ?Obesity Screening not due until??01/01/21??and every 1??year(s)  ??? ? ? ?Alcohol Misuse Screening not due until??01/02/21??and every 1??year(s)  ??? ? ? ?Fall Risk Assessment not due until??01/02/21??and every 1??year(s)  ??? ? ? ?Functional Assessment not due until??01/02/21??and every 1??year(s)  ??? ? ? ?Depression Screening not due until??08/28/21??and every 1??year(s)  ??? ? ? ?Breast Cancer Screening not due until??09/16/21??and every 2??year(s)  ???Satisfied?(in the past 1 year)  ??? ??Satisfied?  ??? ? ? ?Alcohol Misuse Screening on??07/06/20.??Satisfied by Blanca Merchant LPN  ??? ? ? ?Blood Pressure Screening  on??10/22/20.??Satisfied by Blanca Merchant LPN  ??? ? ? ?Body Mass Index Check on??10/22/20.??Satisfied by Blanca Merchant LPN  ??? ? ? ?Bone Density Screening on??12/17/19.??Satisfied by Ursula Willis NP  ??? ? ? ?Depression Screening on??08/28/20.??Satisfied by Blanca Merchant LPN  ??? ? ? ?Diabetes Screening on??05/15/20.??Satisfied by Dylan Bravo.  ??? ? ? ?Fall Risk Assessment on??10/22/20.??Satisfied by Blanca Merchant LPN  ??? ? ? ?Functional Assessment on??08/28/20.??Satisfied by Blanca Merchant LPN  ??? ? ? ?Hypertension Management-Blood Pressure on??10/22/20.??Satisfied by Blanca Merchant LPN  ??? ? ? ?Lipid Screening on??05/15/20.??Satisfied by Dylan Bravo  ??? ? ? ?Medicare Annual Wellness Exam on??12/17/19.??Satisfied by Ursula Willis NP  ??? ? ? ?Obesity Screening on??10/22/20.??Satisfied by Blanca Merchant LPN  ?

## 2022-05-04 NOTE — HISTORICAL [facility]
This is a historical note converted from Reyna. Formatting and pictures may have been removed.  Please reference Reyna for original formatting and attached multimedia. Chief Complaint  6 wk f/u LEFT distal radius fracture  History of Present Illness  Patient is doing?great with no pain in her left wrist.? She denies any swelling or numbness or tingling.  Physical Exam  Vitals & Measurements  T:?36.5? ?C (Oral)? HR:?58(Peripheral)? BP:?136/77?  HT:?148.00?cm? WT:?98.600?kg? BMI:?45.01?  Left wrist exam:  2+ radial pulse  Full range of motion  No tenderness  No swelling, no redness, no increased heat  ?  Radiographs reveal healed fracture of the left distal radius  Assessment/Plan  1.?Left wrist fracture?S62.102A  ?Resume regular activities   Problem List/Past Medical History  Ongoing  Essential hypertension(  Confirmed  )  History of arthroplasty of right knee  Hypothyroidism(  Confirmed  )  Morbid obesity  Obesity(  Probable Diagnosis  )  Osteoarthritis  Osteoarthritis of left knee  Patellar tendonitis of right knee  Right knee pain  Squamous cell carcinoma  Historical  No qualifying data  Procedure/Surgical History  Release of right trigger finger (11/06/2019)  Release of left trigger finger (09/23/2019)  Arthroscopy of knee with medial meniscus repair (03/17/2017)  Excision of squamous cell carcinoma (01/17/2017)  Pap smear and HPV cotesting (08/01/2016)  Colonoscopy (01/01/2015)  Arthroscopy of knee with medial and lateral meniscus repair  Excision of squamous cell carcinoma   Medications  albuterol 2.5 mg/3 mL (0.083%) inhalation solution  furosemide 20 mg oral tablet, See Instructions  irbesartan 75 mg oral tablet, 75 mg= 1 tab(s), Oral, Daily,? ?Still taking, not as prescribed: every 3 days.  KlonoPIN 2 mg oral tablet, 4 mg= 2 tab(s), Oral, Once a day (at bedtime), 2 refills  Lotrisone 1%-0.05% topical cream, 1 julian, TOP, BID  meloxicam 7.5 mg oral tablet, 7.5 mg= 1 tab(s), Oral, Daily,? ?Not Taking,  Completed Rx: Last Dose Date/Time Unknown. done  Mobic 7.5 mg oral tablet, 7.5 mg= 1 tab(s), Oral, BID, 5 refills,? ?Not taking  Mobic 7.5 mg oral tablet, 7.5 mg= 1 tab(s), Oral, Daily,? ?Not taking  Protonix 40 mg ORAL enteric coated tablet, 40 mg= 1 tab(s), Oral, Daily, 6 refills  Synthroid 88 mcg (0.088 mg) oral tablet, See Instructions, 2 refills,? ?Not taking  traMADol 50 mg oral tablet, 50 mg= 1 tab(s), Oral, q8hr  VIT D 50,000 IU D2 (ERGO) CAPS (RX), See Instructions, 3 refills  Voltaren 1% topical gel, 2 gm, TOP, QID, 5 refills  Allergies  Levaquin?(TACHYCARDIA)  Lortab  Zithromax  penicillins  Social History  Abuse/Neglect  No, No, Yes, 01/13/2021  Alcohol  Current, 04/22/2015  Employment/School  Retired, 04/22/2015  Exercise  Exercise duration: 0., 04/22/2015  Home/Environment  Lives with Spouse., 04/22/2015  Nutrition/Health  Calorie restricted, 04/22/2015  Sexual  Sexually active: Yes., 04/22/2015  Substance Use  Never, 04/22/2015  Tobacco  Never (less than 100 in lifetime), N/A, 01/13/2021  Family History  Unable to obtain family history  Health Maintenance  Health Maintenance  ???Pending?(in the next year)  ??? ??OverDue  ??? ? ? ?Tetanus Vaccine due??01/03/18??and every 10??year(s)  ??? ? ? ?Bone Density Screening due??12/18/19??and every 1??day(s)  ??? ? ? ?Aspirin Therapy for CVD Prevention due??04/09/20??and every 1??year(s)  ??? ? ? ?Hypertension Management-Education due??04/09/20??and every 1??year(s)  ??? ? ? ?ADL Screening due??04/09/20??and every 1??year(s)  ??? ? ? ?Influenza Vaccine due??10/01/20??and every 1??day(s)  ??? ? ? ?Hypertension Management-BMP due??12/12/20??and every 1??year(s)  ??? ? ? ?Medicare Annual Wellness Exam due??12/17/20??and every 1??year(s)  ??? ? ? ?Advance Directive due??01/02/21??and every 1??year(s)  ??? ? ? ?Cognitive Screening due??01/02/21??and every 1??year(s)  ??? ? ? ?Fall Risk Assessment due??01/02/21??and every 1??year(s)  ??? ??Due?  ??? ? ? ?Alcohol  Misuse Screening due??01/02/21??and every 1??year(s)  ??? ? ? ?Coronary Artery Disease Maintenance-Antiplatelet Agent Prescribed due??01/13/21??Unknown Frequency  ??? ? ? ?Coronary Artery Disease Maintenance-Lipid Lowering Therapy due??01/13/21??Unknown Frequency  ??? ? ? ?Pneumococcal Vaccine due??01/13/21??Unknown Frequency  ??? ? ? ?Zoster Vaccine due??01/13/21??Unknown Frequency  ??? ??Due In Future?  ??? ? ? ?Breast Cancer Screening not due until??09/16/21??and every 2??year(s)  ??? ? ? ?Depression Screening not due until??10/26/21??and every 1??year(s)  ??? ? ? ?Blood Pressure Screening not due until??12/02/21??and every 1??year(s)  ??? ? ? ?Body Mass Index Check not due until??12/02/21??and every 1??year(s)  ??? ? ? ?Hypertension Management-Blood Pressure not due until??12/02/21??and every 1??year(s)  ??? ? ? ?Obesity Screening not due until??01/01/22??and every 1??year(s)  ??? ? ? ?Functional Assessment not due until??01/02/22??and every 1??year(s)  ???Satisfied?(in the past 1 year)  ??? ??Satisfied?  ??? ? ? ?Alcohol Misuse Screening on??07/06/20.??Satisfied by Blanca Merchant LPN  ??? ? ? ?Blood Pressure Screening on??01/13/21.??Satisfied by Blanca Merchatn LPN  ??? ? ? ?Body Mass Index Check on??01/13/21.??Satisfied by Blanca Merchant LPN  ??? ? ? ?Depression Screening on??01/13/21.??Satisfied by Blanca Merchant LPN  ??? ? ? ?Diabetes Screening on??05/15/20.??Satisfied by Dylan Bravo  ??? ? ? ?Fall Risk Assessment on??12/02/20.??Satisfied by Ryder Samson  ??? ? ? ?Functional Assessment on??01/13/21.??Satisfied by Blanca Merchant LPN  ??? ? ? ?Hypertension Management-Blood Pressure on??01/13/21.??Satisfied by Blanca Merchant LPN  ??? ? ? ?Influenza Vaccine on??12/02/20.??Satisfied by Ryder Samson  ??? ? ? ?Lipid Screening on??05/15/20.??Satisfied by Dylan Bravo  ??? ? ? ?Obesity Screening on??01/13/21.??Satisfied by Blanca Merchant LPN  ?

## 2022-05-04 NOTE — HISTORICAL [facility]ER
This is a historical note converted from Reyna. Formatting and pictures may have been removed.  Please reference Reyna for original formatting and attached multimedia. Chief Complaint  pt here for left wrist pain last 3 months/prior fx 10/2020. she feels like her wrist is deformed and she reports swelling associated with the pain. ?she fell the day she took her cast off and caught herself with her hand  History of Present Illness  Patient comes in today follow-up from a left wrist distal radius fracture she had approximately 2/2020.  She states that approximate 3 months after her last appointment here she did have another fall the wrist and she is concerned that she does have a little bit of?difference in appearance between her left wrist and her right wrist.  States he also has some occasional pain but she points to the area over the base of her left thumb.  ?  She denies any numbness tingling?and no weakness in?her left?extremity  Review of Systems  Constitutional: No fever, No chills.  Respiratory: No shortness of breath, No cough.  Cardiovascular: No chest pain.  Gastrointestinal: No nausea, No vomiting, No diarrhea, No constipation, No heartburn.  Genitourinary: No dysuria, No hematuria.  Hematology/Lymphatics: No bleeding tendency.  Endocrine: No polyuria.  Neurologic: Alert and oriented X4, No numbness, No tingling.  Psychiatric: No anxiety, No depression.  Integumentary: ?negative except as documented in history of present illness  Physical Exam  Vitals & Measurements  T:?37? ?C (Oral)? HR:?75(Peripheral)? BP:?112/78?  HT:?150.00?cm? WT:?97.520?kg? BMI:?43.34?  Wrist Exam:?Left  Negative tenderness over distal radius.  Supination and pronation to 90 degrees and 90 degrees, respectively.  Wrist flexion to 90 degrees and wrist extension to 70 degree.  Negative Tinels test.  Negative Finkelstein?s test.  5/5 strength, normal skin appearance and palpable pulses  Positive grind test of the left CMC joint  She  does have his palpation the CMC joint  There is a small bit of difference in appearance with some atrophy of the left wrist and forearm.  ?  ?  X-rays taken today of the left wrist  Patient has a healed distal radius fracture with slight angulation dorsally, no change from her last x-ray  Patient does have CMC arthritis of the left thumb  ?  Diagnosis:?Left wrist fracture?(healed)  Left thumb CMC osteoarthritis  ?  Plan:  Patient will use a brace for her left thumb CMC?pain?on as-needed basis.  She will call the office she has any problems in the future indentation  Assessment/Plan  1.?Arthritis of carpometacarpal (CMC) joint of left thumb?M18.12  Ordered:  Clinic Follow up, 06/16/21 8:22:00 CDT, prn, Arthritis of carpometacarpal (CMC) joint of left thumb, Orthopaedics  Office/Outpatient Visit Level 3 Established [id number] PC, Arthritis of carpometacarpal (CMC) joint of left thumb, Orthopaedics Clinic, 06/16/21 8:22:00 CDT  ?  Referrals  Clinic Follow up, 06/16/21 8:22:00 CDT, prn, Arthritis of carpometacarpal (CMC) joint of left thumb, Orthopaedics   Problem List/Past Medical History  Ongoing  Arthritis of carpometacarpal (CMC) joint of left thumb  Essential hypertension(  Confirmed  )  History of arthroplasty of right knee  Hypothyroidism(  Confirmed  )  Morbid obesity  Obesity(  Probable Diagnosis  )  Osteoarthritis  Osteoarthritis of left knee  Patellar tendonitis of right knee  Right knee pain  Squamous cell carcinoma  Historical  No qualifying data  Procedure/Surgical History  Release of right trigger finger (11/06/2019)  Release of left trigger finger (09/23/2019)  Arthroscopy of knee with medial meniscus repair (03/17/2017)  Excision of squamous cell carcinoma (01/17/2017)  Pap smear and HPV cotesting (08/01/2016)  Colonoscopy (01/01/2015)  Arthroscopy of knee with medial and lateral meniscus repair  Excision of squamous cell carcinoma   Medications  albuterol 2.5 mg/3 mL (0.083%) inhalation  solution,? ?Not taking: PRN  ergocalciferol 50,000 intl units (1.25 mg) oral capsule, 43728 IntUnit= 1 cap(s), Oral, qWeek, 4 refills  fluticasone 50 mcg/inh nasal spray, 2 spray(s), Both Nostrils, Daily  furosemide 20 mg oral tablet, See Instructions  irbesartan 75 mg oral tablet, 75 mg= 1 tab(s), Oral, Daily, 4 refills  KlonoPIN 2 mg oral tablet, 4 mg= 2 tab(s), Oral, Once a day (at bedtime), 2 refills  Lexapro 10 mg oral tablet, 10 mg= 1 tab(s), Oral, Daily, 5 refills  physical therapy, See Instructions  Protonix 40 mg ORAL enteric coated tablet, 40 mg= 1 tab(s), Oral, Daily, 6 refills  VIT D 50,000 IU D2 (ERGO) CAPS (RX), See Instructions, 3 refills  Allergies  Levaquin?(TACHYCARDIA)  Lortab  Zithromax  penicillins  Social History  Abuse/Neglect  No, No, Yes, 06/11/2021  Alcohol  Current, 04/22/2015  Employment/School  Retired, 04/22/2015  Exercise  Exercise duration: 0., 04/22/2015  Home/Environment  Lives with Spouse., 04/22/2015  Nutrition/Health  Calorie restricted, 04/22/2015  Sexual  Sexually active: Yes., 04/22/2015  Substance Use  Never, 04/22/2015  Tobacco  Never (less than 100 in lifetime), N/A, 06/11/2021  Family History  Unable to obtain family history  Immunizations  Vaccine Date Status   COVID-19 mRNA, LNP-S, PF - Moderna 03/10/2021 Recorded   COVID-19 mRNA, LNP-S, PF - Moderna 02/10/2021 Recorded   Health Maintenance  Health Maintenance  ???Pending?(in the next year)  ??? ??OverDue  ??? ? ? ?Tetanus Vaccine due??01/03/18??and every 10??year(s)  ??? ? ? ?Hypertension Management-Education due??04/09/20??and every 1??year(s)  ??? ? ? ?Influenza Vaccine due??10/01/20??and every 1??day(s)  ??? ??Due?  ??? ? ? ?Coronary Artery Disease Maintenance-Antiplatelet Agent Prescribed due??06/16/21??Unknown Frequency  ??? ? ? ?Coronary Artery Disease Maintenance-Lipid Lowering Therapy due??06/16/21??Unknown Frequency  ??? ? ? ?Pneumococcal Vaccine due??06/16/21??Unknown Frequency  ??? ? ? ?Zoster Vaccine  due??06/16/21??Unknown Frequency  ??? ??Refused?  ??? ? ? ?Bone Density Screening due??12/18/19??and every 1??day(s)  ??? ??Due In Future?  ??? ? ? ?Breast Cancer Screening not due until??09/16/21??and every 2??year(s)  ??? ? ? ?Obesity Screening not due until??01/01/22??and every 1??year(s)  ??? ? ? ?Advance Directive not due until??01/02/22??and every 1??year(s)  ??? ? ? ?Alcohol Misuse Screening not due until??01/02/22??and every 1??year(s)  ??? ? ? ?Cognitive Screening not due until??01/02/22??and every 1??year(s)  ??? ? ? ?Fall Risk Assessment not due until??01/02/22??and every 1??year(s)  ??? ? ? ?Functional Assessment not due until??01/02/22??and every 1??year(s)  ??? ? ? ?Hypertension Management-BMP not due until??01/15/22??and every 1??year(s)  ??? ? ? ?ADL Screening not due until??01/18/22??and every 1??year(s)  ??? ? ? ?Medicare Annual Wellness Exam not due until??01/18/22??and every 1??year(s)  ??? ? ? ?Aspirin Therapy for CVD Prevention not due until??01/18/22??and every 1??year(s)  ??? ? ? ?Blood Pressure Screening not due until??06/11/22??and every 1??year(s)  ??? ? ? ?Body Mass Index Check not due until??06/11/22??and every 1??year(s)  ??? ? ? ?Depression Screening not due until??06/11/22??and every 1??year(s)  ??? ? ? ?Hypertension Management-Blood Pressure not due until??06/11/22??and every 1??year(s)  ???Satisfied?(in the past 1 year)  ??? ??Satisfied?  ??? ? ? ?ADL Screening on??01/18/21.??Satisfied by Mamie Waite LPN  ??? ? ? ?Advance Directive on??04/22/21.??Satisfied by Dana Rob  ??? ? ? ?Alcohol Misuse Screening on??04/22/21.??Satisfied by Dana Rob  ??? ? ? ?Aspirin Therapy for CVD Prevention on??01/18/21.??Satisfied by Mamie Waite LPN  ??? ? ? ?Blood Pressure Screening on??06/11/21.??Satisfied by Marcia Perez  ??? ? ? ?Body Mass Index Check on??06/11/21.??Satisfied by Marcia Perez  ??? ? ? ?Cervical Cancer Screening on??02/11/21.??Satisfied by  April Prakash  ??? ? ? ?Cognitive Screening on??04/22/21.??Satisfied by Dana Rob  ??? ? ? ?Depression Screening on??06/11/21.??Satisfied by Marcia Perez  ??? ? ? ?Diabetes Screening on??01/15/21.??Satisfied by Caro Mosher  ??? ? ? ?Fall Risk Assessment on??06/11/21.??Satisfied by Marcia Perez  ??? ? ? ?Functional Assessment on??04/22/21.??Satisfied by Dana Rob  ??? ? ? ?Hypertension Management-Blood Pressure on??06/11/21.??Satisfied by Marcia Perez  ??? ? ? ?Influenza Vaccine on??04/22/21.??Satisfied by Dana Rob  ??? ? ? ?Lipid Screening on??01/15/21.??Satisfied by Caro Mosher  ??? ? ? ?Medicare Annual Wellness Exam on??01/18/21.??Satisfied by Amrit Vogt II, MD  ??? ? ? ?Obesity Screening on??06/11/21.??Satisfied by Marcia Perez  ??? ??Refused?  ??? ? ? ?Bone Density Screening on??04/22/21.??Recorded by Dana oRb  ?

## 2022-05-04 NOTE — HISTORICAL [facility]
This is a historical note converted from Reyna. Formatting and pictures may have been removed.  Please reference Reyna for original formatting and attached multimedia. Chief Complaint  here for 1 month f/u L distal radius w/ xr... c/o R thumb pain, hurts to pull back .. takes IBprofen  History of Present Illness  65 year old female presents today for 1 month f/u of left distal radius fracture.? She is 5 weeks?from her injury.? She has been doing well in her cast.? She is complaining of some anterior right knee pain. ?This was?present when she fell nearly 6 weeks ago.? It is gotten a little better but still present.  Review of Systems  Systemic: No fever, no chills, and no recent weight change.  Head: No headache - frequent.  Eyes: No vision problems.  Otolarnygeal: No hearing loss, no earache, no epistaxis, no hoarseness, and no tooth pain. Gums normal.  Cardiovascular: No chest pain or discomfort and no palpitations.  Pulmonary: No pulmonary symptoms - no dyspnea, no shortness of breath  Gastrointestinal: Appetite not decreased. No dysphagia and no constant eructation. No nausea, no vomiting, no abdominal pain, no hematochezia.  Genitourinary: No genitourinary symptoms - No urinary hesitancy. No urinary loss of control - no burning sensation during urination.  Musculoskeletal: No calf muscle cramps and no localized soft tissue swelling  Neurological: No fainting and no convulsions.  Psychological: no depression.  Skin: No rash.  Physical Exam  Vitals & Measurements  HR:?79(Peripheral)? BP:?136/79?  HT:?148.00?cm? WT:?98.600?kg? BMI:?45.01?  Left wrist exam  Skin clean dry and intact  No bruising or swelling  Mild tenderness over the distal radius  Intact range of motion to the wrist and digits  Sensation intact distally  Radial pulses 2+  ?   Radiographs of the?left wrist show maintenance of acceptable alignment and reduction?of the distal radius?fracture?and interval?healing  ?   Right knee exam  No swelling,  erythema, increased heat  She does have tenderness?over the?mid patella  Mild discomfort with patellar compression  No medial or lateral joint line tenderness  Range of motion is from 0 to 115 degrees  Her knee is ligamentously stable  Negative medial lateral Chaz  Negative Lockman exam  Sensation intact distally  Pedal pulses 2+  ?   Right knee radiographs taken office today show?intact?prostheses with no signs of loosening, subsidence or fracture  Assessment/Plan  1.?Distal radius fracture, left?S52.502A  ?Transition to a forearm?brace?and follow-up in?6 weeks for repeat evaluation with x-rays of the left wrist at that time. ?Encourage range of motion exercises  Ordered:  Clinic Follow up, *Est. 01/13/21 3:00:00 CST, Order for future visit, Right knee pain  Distal radius fracture, left, [facility]  ?  2.?Right knee pain?M25.561  ?Reassured the patient that she just suffered a?knee contusion with no signs of fracture?and her implants look well fixed.? Follow-up as needed  Ordered:  Clinic Follow up, *Est. 01/13/21 3:00:00 CST, Order for future visit, Right knee pain  Distal radius fracture, left, [facility]  XR Knee Right 3 Views, Routine, 12/02/20 10:24:00 CST, None, Ambulatory, Rad Type, Right knee pain, Not Scheduled, 12/02/20 10:24:00 CST  ?  Referrals  Clinic Follow up, *Est. 01/13/21 3:00:00 CST, Order for future visit, Right knee pain  Distal radius fracture, left, [facility]   Problem List/Past Medical History  Ongoing  Essential hypertension(  Confirmed  )  History of arthroplasty of right knee  Hypothyroidism(  Confirmed  )  Morbid obesity  Obesity(  Probable Diagnosis  )  Osteoarthritis  Osteoarthritis of left knee  Patellar tendonitis of right knee  Right knee pain  Squamous cell carcinoma  Historical  No qualifying data  Procedure/Surgical History  Release of right trigger finger (11/06/2019)  Release of left trigger finger (09/23/2019)  Arthroscopy of knee with medial meniscus  repair (03/17/2017)  Excision of squamous cell carcinoma (01/17/2017)  Pap smear and HPV cotesting (08/01/2016)  Colonoscopy (01/01/2015)  Arthroscopy of knee with medial and lateral meniscus repair  Excision of squamous cell carcinoma   Medications  albuterol 2.5 mg/3 mL (0.083%) inhalation solution  furosemide 20 mg oral tablet, See Instructions  irbesartan 75 mg oral tablet, 75 mg= 1 tab(s), Oral, Daily,? ?Still taking, not as prescribed: every 3 days.  KlonoPIN 2 mg oral tablet, 4 mg= 2 tab(s), Oral, Once a day (at bedtime), 2 refills  Lotrisone 1%-0.05% topical cream, 1 julian, TOP, BID  meloxicam 7.5 mg oral tablet, 7.5 mg= 1 tab(s), Oral, Daily,? ?Not Taking, Completed Rx: Last Dose Date/Time Unknown. done  Mobic 7.5 mg oral tablet, 7.5 mg= 1 tab(s), Oral, BID, 5 refills,? ?Not taking  Mobic 7.5 mg oral tablet, 7.5 mg= 1 tab(s), Oral, Daily,? ?Not taking  Protonix 40 mg ORAL enteric coated tablet, 40 mg= 1 tab(s), Oral, Daily, 6 refills  Synthroid 88 mcg (0.088 mg) oral tablet, See Instructions, 2 refills,? ?Not taking  traMADol 50 mg oral tablet, 50 mg= 1 tab(s), Oral, q8hr  VIT D 50,000 IU D2 (ERGO) CAPS (RX), See Instructions, 3 refills  Voltaren 1% topical gel, 2 gm, TOP, QID, 5 refills  Allergies  Levaquin?(TACHYCARDIA)  Lortab  Zithromax  penicillins  Social History  Abuse/Neglect  No, No, Yes, 12/02/2020  Alcohol  Current, 04/22/2015  Employment/School  Retired, 04/22/2015  Exercise  Exercise duration: 0., 04/22/2015  Home/Environment  Lives with Spouse., 04/22/2015  Nutrition/Health  Calorie restricted, 04/22/2015  Sexual  Sexually active: Yes., 04/22/2015  Substance Use  Never, 04/22/2015  Tobacco  Never (less than 100 in lifetime), N/A, 12/02/2020  Family History  Unable to obtain family history  Health Maintenance  Health Maintenance  ???Pending?(in the next year)  ??? ??OverDue  ??? ? ? ?Tetanus Vaccine due??01/03/18??and every 10??year(s)  ??? ? ? ?Bone Density Screening due??12/18/19??and every  1??day(s)  ??? ? ? ?Advance Directive due??01/02/20??and every 1??year(s)  ??? ? ? ?Cognitive Screening due??01/02/20??and every 1??year(s)  ??? ? ? ?Aspirin Therapy for CVD Prevention due??04/09/20??and every 1??year(s)  ??? ? ? ?Hypertension Management-Education due??04/09/20??and every 1??year(s)  ??? ? ? ?ADL Screening due??04/09/20??and every 1??year(s)  ??? ? ? ?Influenza Vaccine due??10/01/20??and every 1??day(s)  ??? ??Due?  ??? ? ? ?Coronary Artery Disease Maintenance-Antiplatelet Agent Prescribed due??12/02/20??Unknown Frequency  ??? ? ? ?Coronary Artery Disease Maintenance-Lipid Lowering Therapy due??12/02/20??Unknown Frequency  ??? ? ? ?Pneumococcal Vaccine due??12/02/20??Unknown Frequency  ??? ? ? ?Zoster Vaccine due??12/02/20??Unknown Frequency  ??? ??Due In Future?  ??? ? ? ?Hypertension Management-BMP not due until??12/12/20??and every 1??year(s)  ??? ? ? ?Medicare Annual Wellness Exam not due until??12/17/20??and every 1??year(s)  ??? ? ? ?Obesity Screening not due until??01/01/21??and every 1??year(s)  ??? ? ? ?Alcohol Misuse Screening not due until??01/02/21??and every 1??year(s)  ??? ? ? ?Fall Risk Assessment not due until??01/02/21??and every 1??year(s)  ??? ? ? ?Functional Assessment not due until??01/02/21??and every 1??year(s)  ??? ? ? ?Breast Cancer Screening not due until??09/16/21??and every 2??year(s)  ??? ? ? ?Depression Screening not due until??10/26/21??and every 1??year(s)  ???Satisfied?(in the past 1 year)  ??? ??Satisfied?  ??? ? ? ?Alcohol Misuse Screening on??07/06/20.??Satisfied by Blanca Merchant LPN  ??? ? ? ?Blood Pressure Screening on??12/02/20.??Satisfied by Ryder Samson  ??? ? ? ?Body Mass Index Check on??12/02/20.??Satisfied by Ryder Samson  ??? ? ? ?Bone Density Screening on??12/17/19.??Satisfied by Ursula Willis NP  ??? ? ? ?Depression Screening on??10/26/20.??Satisfied by Blanca Merchant LPN  ??? ? ? ?Diabetes Screening on??05/15/20.??Satisfied  by Dylan Bravo  ??? ? ? ?Fall Risk Assessment on??12/02/20.??Satisfied by Ryder Samson  ??? ? ? ?Functional Assessment on??11/02/20.??Satisfied by Varsha Brown LPN  ??? ? ? ?Hypertension Management-Blood Pressure on??12/02/20.??Satisfied by Ryder Samson  ??? ? ? ?Influenza Vaccine on??12/02/20.??Satisfied by Ryder Samson  ??? ? ? ?Lipid Screening on??05/15/20.??Satisfied by Dylan Bravo  ??? ? ? ?Medicare Annual Wellness Exam on??12/17/19.??Satisfied by Ursula Willis NP  ??? ? ? ?Obesity Screening on??12/02/20.??Satisfied by Ryder Samson  ?

## 2022-05-04 NOTE — HISTORICAL [facility]ER
This is a historical note converted from Reyna. Formatting and pictures may have been removed.  Please reference Reyna for original formatting and attached multimedia. Chief Complaint  Left distal radius fx follow up. PT c/o pain in her wrist and a large bruise in her left knee that is hard.  History of Present Illness  Varsha comes in today for a 1 week?x-ray check of her left distal radius fracture. ?She is in a well fitted splint.? No new complaints today  Review of Systems  Systemic: No fever, no chills, and no recent weight change.  Head: No headache - frequent.  Eyes: No vision problems.  Otolarnygeal: No hearing loss, no earache, no epistaxis, no hoarseness, and no tooth pain. Gums normal.  Cardiovascular: No chest pain or discomfort and no palpitations.  Pulmonary: No pulmonary symptoms - no dyspnea, no shortness of breath  Gastrointestinal: Appetite not decreased. No dysphagia and no constant eructation. No nausea, no vomiting, no abdominal pain, no hematochezia.  Genitourinary: No genitourinary symptoms - No urinary hesitancy. No urinary loss of control - no burning sensation during urination.  Musculoskeletal: No calf muscle cramps and no localized soft tissue swelling  Neurological: No fainting and no convulsions.  Psychological: no depression.  Skin: No rash.  Physical Exam  Vitals & Measurements  T:?36.5? ?C (Oral)? HR:?55(Peripheral)? BP:?124/87?  HT:?148.00?cm? WT:?98.600?kg? BMI:?45.01?  Left upper extremity exam  Well fitted?splint.  Digital range of motion intact  Cap refill less than 2 seconds  Sensation intact distally  ?  Left wrist x-rays taken the office today show?no interval change?since last x-ray exam.? Distal radius fracture within acceptable alignment  Repeat left wrist x-rays in cast?show no?change in alignment?of distal radius fracture.  Assessment/Plan  Distal radius fracture, left?S52.502A  ?Short arm cast applied today  Follow-up in 1 week?for repeat x-rays in  cast  Ordered:  Clinic Follow up, *Est. 11/02/20 3:00:00 CST, Order for future visit, Distal radius fracture, left, [facility]  [facility], 10/26/20 8:09:00 CDT, [facility], Routine, 10/26/20 8:09:00 CDT, Distal radius fracture, left  ?  Referrals  Clinic Follow up, *Est. 11/02/20 3:00:00 CST, Order for future visit, Distal radius fracture, left, [facility]   Problem List/Past Medical History  Ongoing  Essential hypertension(  Confirmed  )  History of arthroplasty of right knee  Hypothyroidism(  Confirmed  )  Morbid obesity  Obesity(  Probable Diagnosis  )  Osteoarthritis  Osteoarthritis of left knee  Patellar tendonitis of right knee  Squamous cell carcinoma  Historical  No qualifying data  Procedure/Surgical History  Release of right trigger finger (11/06/2019)  Release of left trigger finger (09/23/2019)  Arthroscopy of knee with medial meniscus repair (03/17/2017)  Excision of squamous cell carcinoma (01/17/2017)  Pap smear and HPV cotesting (08/01/2016)  Colonoscopy (01/01/2015)  Arthroscopy of knee with medial and lateral meniscus repair  Excision of squamous cell carcinoma   Medications  albuterol 2.5 mg/3 mL (0.083%) inhalation solution  furosemide 20 mg oral tablet, See Instructions  irbesartan 75 mg oral tablet, 75 mg= 1 tab(s), Oral, Daily,? ?Still taking, not as prescribed: every 3 days.  KlonoPIN 2 mg oral tablet, 4 mg= 2 tab(s), Oral, Once a day (at bedtime), 2 refills  Lotrisone 1%-0.05% topical cream, 1 julian, TOP, BID  meloxicam 7.5 mg oral tablet, 7.5 mg= 1 tab(s), Oral, Daily,? ?Not Taking, Completed Rx: Last Dose Date/Time Unknown. done  Mobic 7.5 mg oral tablet, 7.5 mg= 1 tab(s), Oral, BID, 5 refills,? ?Not taking  Mobic 7.5 mg oral tablet, 7.5 mg= 1 tab(s), Oral, Daily,? ?Not taking  Protonix 40 mg ORAL enteric coated tablet, 40 mg= 1 tab(s), Oral, Daily, 6 refills  Synthroid 88 mcg (0.088 mg) oral tablet, See Instructions, 2 refills,? ?Not taking  traMADol 50  mg oral tablet, 50 mg= 1 tab(s), Oral, q8hr, PRN  VIT D 50,000 IU D2 (ERGO) CAPS (RX), See Instructions, 3 refills  Voltaren 1% topical gel, 2 gm, TOP, QID, 5 refills  Allergies  Levaquin?(TACHYCARDIA)  Lortab  Zithromax  penicillins  Social History  Abuse/Neglect  No, No, Yes, 10/26/2020  Alcohol  Current, 04/22/2015  Employment/School  Retired, 04/22/2015  Exercise  Exercise duration: 0., 04/22/2015  Home/Environment  Lives with Spouse., 04/22/2015  Nutrition/Health  Calorie restricted, 04/22/2015  Sexual  Sexually active: Yes., 04/22/2015  Substance Use  Never, 04/22/2015  Tobacco  Never (less than 100 in lifetime), N/A, 10/26/2020  Family History  Unable to obtain family history  Health Maintenance  Health Maintenance  ???Pending?(in the next year)  ??? ??OverDue  ??? ? ? ?Tetanus Vaccine due??01/03/18??and every 10??year(s)  ??? ? ? ?Influenza Vaccine due??10/01/19??and every 1??day(s)  ??? ? ? ?Bone Density Screening due??12/18/19??and every 1??day(s)  ??? ? ? ?Advance Directive due??01/02/20??and every 1??year(s)  ??? ? ? ?Cognitive Screening due??01/02/20??and every 1??year(s)  ??? ? ? ?Aspirin Therapy for CVD Prevention due??04/09/20??and every 1??year(s)  ??? ? ? ?Hypertension Management-Education due??04/09/20??and every 1??year(s)  ??? ? ? ?ADL Screening due??04/09/20??and every 1??year(s)  ??? ??Due?  ??? ? ? ?Coronary Artery Disease Maintenance-Antiplatelet Agent Prescribed due??10/26/20??Unknown Frequency  ??? ? ? ?Coronary Artery Disease Maintenance-Lipid Lowering Therapy due??10/26/20??Unknown Frequency  ??? ? ? ?Pneumococcal Vaccine due??10/26/20??Unknown Frequency  ??? ? ? ?Zoster Vaccine due??10/26/20??Unknown Frequency  ??? ??Near Due?  ??? ? ? ?Medicare Annual Wellness Exam near due??12/17/20??and every 1??year(s)  ??? ??Due In Future?  ??? ? ? ?Hypertension Management-BMP not due until??12/12/20??and every 1??year(s)  ??? ? ? ?Obesity Screening not due until??01/01/21??and every  1??year(s)  ??? ? ? ?Alcohol Misuse Screening not due until??01/02/21??and every 1??year(s)  ??? ? ? ?Fall Risk Assessment not due until??01/02/21??and every 1??year(s)  ??? ? ? ?Functional Assessment not due until??01/02/21??and every 1??year(s)  ??? ? ? ?Depression Screening not due until??08/28/21??and every 1??year(s)  ??? ? ? ?Breast Cancer Screening not due until??09/16/21??and every 2??year(s)  ??? ? ? ?Blood Pressure Screening not due until??10/22/21??and every 1??year(s)  ??? ? ? ?Body Mass Index Check not due until??10/22/21??and every 1??year(s)  ??? ? ? ?Hypertension Management-Blood Pressure not due until??10/22/21??and every 1??year(s)  ???Satisfied?(in the past 1 year)  ??? ??Satisfied?  ??? ? ? ?Alcohol Misuse Screening on??07/06/20.??Satisfied by Blanca Merchant LPN  ??? ? ? ?Blood Pressure Screening on??10/26/20.??Satisfied by Blanca Merchant LPN  ??? ? ? ?Body Mass Index Check on??10/26/20.??Satisfied by Blanca Merchant LPN  ??? ? ? ?Bone Density Screening on??12/17/19.??Satisfied by Ursula Willis NP  ??? ? ? ?Depression Screening on??10/26/20.??Satisfied by Blanca Merchant LPN  ??? ? ? ?Diabetes Screening on??05/15/20.??Satisfied by Dylan Bravo  ??? ? ? ?Fall Risk Assessment on??10/26/20.??Satisfied by Blanca Merchant LPN  ??? ? ? ?Functional Assessment on??08/28/20.??Satisfied by Blanca Merchant LPN  ??? ? ? ?Hypertension Management-Blood Pressure on??10/26/20.??Satisfied by Blanca Merchant LPN  ??? ? ? ?Lipid Screening on??05/15/20.??Satisfied by Dylan Bravo  ??? ? ? ?Medicare Annual Wellness Exam on??12/17/19.??Satisfied by Ursula Willis NP  ??? ? ? ?Obesity Screening on??10/26/20.??Satisfied by Blanca Merchant LPN  ?

## 2022-05-04 NOTE — HISTORICAL [facility]
This is a historical note converted from Reyna. Formatting and pictures may have been removed.  Please reference Reyna for original formatting and attached multimedia. Chief Complaint  f/u left knee, continuing therapy and having less pain, also c/o right knee pain that started about a month ago, hx sx right TKA 11/27/17, states pain when bending and sharp sting at times, xrays today.....sm  History of Present Illness  Varsha comes in today for evaluation of her left knee?and right knee pain. ?She has a known history of advanced osteoarthritis of the left knee but?not a surgical candidate secondary to morbid obesity. ?She is currently working with physical therapy which is helped.? She is?inquiring about Visco supplementation injections.? She is also complaining of some anterior right knee pain is been present for couple of months. ?She has a history?of right total knee arthroplasty done in 2017 by Dr. Maurer.? She denies any history of injury.  Review of Systems  Systemic: No fever, no chills, and no recent weight change.  Head: No headache - frequent.  Eyes: No vision problems.  Otolarnygeal: No hearing loss, no earache, no epistaxis, no hoarseness, and no tooth pain. Gums normal.  Cardiovascular: No chest pain or discomfort and no palpitations.  Pulmonary: No pulmonary symptoms - no dyspnea, no shortness of breath  Gastrointestinal: Appetite not decreased. No dysphagia and no constant eructation. No nausea, no vomiting, no abdominal pain, no hematochezia.  Genitourinary: No genitourinary symptoms - No urinary hesitancy. No urinary loss of control - no burning sensation during urination.  Musculoskeletal: No calf muscle cramps and no localized soft tissue swelling  Neurological: No fainting and no convulsions.  Psychological: no depression.  Skin: No rash.  Physical Exam  Vitals & Measurements  T:?98.1? ?F (Oral)? HR:?84(Peripheral)? BP:?134/74?  HT:?148?cm? WT:?99?kg? BMI:?45.2?  PHYSICAL  FINDINGS  Cardiovascular:  Arterial Pulses: Posterior tibialis pulses were normal left. Dorsalis pedis pulses were normal left.  Musculoskeletal System:  Thigh:  Left Thigh: Thigh showed quadriceps atrophy.  Knee:  Left Knee: Examined.  Knee:  Grade in the knee: Value  Grade effusion 1  Genu varum. Patella demonstrated crepitus. Anteromedial aspect was tender on palpation. Medial aspect was tender on palpation. Medial collateral ligament was tender on palpation. Active motion.  Left Knee:  Left Knee Motion: Value  Active flexion 120_ degrees  Active extension 5_ degrees  Pain was elicited by flexion. No erythema. No warmth. No medial instability. No lateral instability. No one plane medial (straight) instability. No one plane lateral (straight) instability. A Lachman test did not demonstrate one plane anterior instability.  Neurological:  Gait And Stance: A left-sided antalgic gait was observed.  ?  ?   TESTS  Imaging:  X-Ray Knee:  A complete knee x-ray with standing views was?reviewed -of left knee.  AP and lateral view x-rays of the left knee with sunrise view of the patella were?reviewed -of left knee.  ?  ?   IMPRESSIONS RADIOLOGY TEST  Narrowing of the left knee joint space bone on bone, of the left knee joint space, and osteophytes arising from the left knee.  ?   kellegren mitch grade 4  ?   Cardiovascular:  Arterial Pulses: ? Dorsalis pedis pulses were normal.  Musculoskeletal System:  Right?knee:  General: ? No swelling of the knee. ? No warmth of the knee. ? No pain was elicited by motion of the knee. ? No instability of the knee. ? Knees demonstrated no muscle weakness.  Tender over the patella tendon. ?Intact extensor mechanism  Right?knee: ? Motion was normal.  Active flexion?110 degrees  Active extension 0 degrees  Neurological:  Sensation: ? Monofilament wire test of the leg/foot was normal.  Motor (Strength): ? No weakness of the?right knee was observed.  Skin:  ? No cellulitis. Surgical incision  well healed ?  Tests  Imaging:  X-Ray Knee:  A complete knee x-ray with standing views was performed -of?right knee.  Impressions Radiology Test  X-ray of knee was performed intact?right knee implant.  Assessment/Plan  1.?Patellar tendonitis of right knee?M76.51  ?Voltaren gel and home exercise program  2.?History of arthroplasty of right knee?Z96.651  ?Home exercising  3.?Osteoarthritis of left knee?M17.12  ?Recommend Synvisc 1 injection?as the patient is not a?surgical candidate with?history of morbid obesity and a BMI of 45.? We will get this authorized and bring her in?the injection once authorized.  4.?Morbid obesity?E66.01  Orders:  diclofenac topical, 2 gm =, TOP, QID, apply 2 gm to affected area 4 times daily, # 100 gm, 5 Refill(s), Pharmacy: [place], [place], 148, cm, Height/Length Dosing, 01/27/20 15:21:00 CST, 99, kg, Weight Dosing, 01/27/20 15:21:00 CST  Clinic Follow-up PRN, 01/27/20 15:52:00 CST, Future Order, [facility]  Referrals  Clinic Follow-up PRN, 01/27/20 15:52:00 CST, Future Order, [facility]   Problem List/Past Medical History  Ongoing  Essential hypertension(  Confirmed  )  History of arthroplasty of right knee  Hypothyroidism(  Confirmed  )  Morbid obesity  Obesity(  Probable Diagnosis  )  Osteoarthritis  Osteoarthritis of left knee  Patellar tendonitis of right knee  Squamous cell carcinoma  Historical  No qualifying data  Procedure/Surgical History  Release of right trigger finger (11/06/2019)  Release of left trigger finger (09/23/2019)  Arthroscopy of knee with medial meniscus repair (03/17/2017)  Excision of squamous cell carcinoma (01/17/2017)  Pap smear and HPV cotesting (08/01/2016)  Colonoscopy (01/01/2015)  Arthroscopy of knee with medial and lateral meniscus repair  Excision of squamous cell carcinoma   Medications  albuterol 2.5 mg/3 mL (0.083%) inhalation solution  furosemide 20 mg oral tablet, See Instructions  irbesartan 75 mg oral tablet, 75  mg= 1 tab(s), Oral, Daily,? ?Still taking, not as prescribed: every 3 days.  KlonoPIN 2 mg oral tablet, 3 mg= 1.5 tab(s), Oral, Once a day (at bedtime)  meloxicam 7.5 mg oral tablet, 7.5 mg= 1 tab(s), Oral, Daily,? ?Not Taking, Completed Rx: Last Dose Date/Time Unknown. done  Protonix 40 mg ORAL enteric coated tablet, 40 mg= 1 tab(s), Oral, Daily, 6 refills  Synthroid 88 mcg (0.088 mg) oral tablet, See Instructions, 2 refills  VIT D 50,000 IU D2 (ERGO) CAPS (RX), See Instructions, 3 refills  Voltaren 1% topical gel, 2 gm, TOP, QID, 5 refills  Allergies  Levaquin?(TACHYCARDIA)  Lortab  Zithromax  penicillins  Social History  Abuse/Neglect  No, No, Yes, 12/17/2019  Alcohol  Current, 04/22/2015  Employment/School  Retired, 04/22/2015  Exercise  Exercise duration: 0., 04/22/2015  Home/Environment  Lives with Spouse., 04/22/2015  Nutrition/Health  Calorie restricted, 04/22/2015  Sexual  Sexually active: Yes., 04/22/2015  Substance Use  Never, 04/22/2015  Tobacco  Never (less than 100 in lifetime), N/A, 12/17/2019  Family History  Unable to obtain family history  Health Maintenance  Health Maintenance  ???Pending?(in the next year)  ??? ??OverDue  ??? ? ? ?Tetanus Vaccine due??01/03/18??and every 10??year(s)  ??? ? ? ?Advance Directive due??01/01/20??and every 1??year(s)  ??? ? ? ?Geriatric Depression Screening due??01/01/20??and every 1??year(s)  ??? ??Due?  ??? ? ? ?Bone Density Screening due??12/18/19??and every 1??day(s)  ??? ? ? ?Alcohol Misuse Screening due??01/01/20??and every 1??year(s)  ??? ? ? ?Cognitive Screening due??01/01/20??and every 1??year(s)  ??? ? ? ?Fall Risk Assessment due??01/01/20??and every 1??year(s)  ??? ? ? ?Functional Assessment due??01/01/20??and every 1??year(s)  ??? ? ? ?Coronary Artery Disease Maintenance-Antiplatelet Agent Prescribed due??01/27/20??and every?  ??? ? ? ?Coronary Artery Disease Maintenance-Lipid Lowering Therapy due??01/27/20??and every?  ??? ? ? ?Pneumococcal Vaccine  due??01/27/20??Variable frequency  ??? ? ? ?Pneumococcal Vaccine due??01/27/20??and every?  ??? ? ? ?Zoster Vaccine due??01/27/20??and every 100??year(s)  ??? ??Due In Future?  ??? ? ? ?Aspirin Therapy for CVD Prevention not due until??04/09/20??and every 1??year(s)  ??? ? ? ?Hypertension Management-Education not due until??04/09/20??and every 1??year(s)  ??? ? ? ?ADL Screening not due until??04/09/20??and every 1??year(s)  ??? ? ? ?Hypertension Management-BMP not due until??12/12/20??and every 1??year(s)  ??? ? ? ?Hypertension Management-Blood Pressure not due until??12/16/20??and every 1??year(s)  ??? ? ? ?Obesity Screening not due until??01/01/21??and every 1??year(s)  ???Satisfied?(in the past 1 year)  ??? ??Satisfied?  ??? ? ? ?ADL Screening on??04/09/19.??Satisfied by Iglus ELIUN, Jonathan E.  ??? ? ? ?Advance Directive on??04/09/19.??Satisfied by Iglus LPN, Jonathan E.  ??? ? ? ?Alcohol Misuse Screening on??04/09/19.??Satisfied by Iglus LPN, Jonathan E.  ??? ? ? ?Aspirin Therapy for CVD Prevention on??04/09/19.??Satisfied by Iglus LPN, Jonathan E.  ??? ? ? ?Blood Pressure Screening on??01/27/20.??Satisfied by Charley Pop LPN  ??? ? ? ?Body Mass Index Check on??01/27/20.??Satisfied by Charley Pop LPN  ??? ? ? ?Bone Density Screening on??12/17/19.??Satisfied by Ursula Willis NP  ??? ? ? ?Breast Cancer Screening ([facility]) on??09/17/19.??Satisfied by Feli Randhawa  ??? ? ? ?Cognitive Screening on??04/09/19.??Satisfied by Jonathan Morataya LPN E.  ??? ? ? ?Depression Screening on??04/09/19.??Satisfied by Iglus VAN, Jonathan E.  ??? ? ? ?Diabetes Screening on??12/13/19.??Satisfied by Liz Elder  ??? ? ? ?Fall Risk Assessment on??12/17/19.??Satisfied by Liz Elder  ??? ? ? ?Functional Assessment on??04/09/19.??Satisfied by Hood ARAUJO, Jonathan E.  ??? ? ? ?Geriatric Depression Screening on??04/09/19.??Satisfied by Jonathan Morataya LPN E.  ??? ? ? ?Hypertension Management-Blood Pressure on??01/27/20.??Satisfied  by Charley Pop LPN  ??? ? ? ?Lipid Screening on??12/13/19.??Satisfied by Liz Elder  ??? ? ? ?Obesity Screening on??01/27/20.??Satisfied by Charley Pop LPN  ??? ??Refused?  ??? ? ? ?Pneumococcal Vaccine ([facility]) on??04/09/19.??Recorded by Lazaro NELSON, Ursula DUARTE??Reason: Patient Refuses  ?

## 2022-05-04 NOTE — HISTORICAL [facility] CERNER
This is a historical note converted from [place]. Formatting and pictures may have been removed.  Please reference [place] for original formatting and attached multimedia. Chief Complaint  LEFT KNEE PAIN DOI SHE HAS HAD THE PAIN FOR 3 YEARS SHE HAS A SCOPE IN 2016. ?SHE STATES SHE FELL LAST WEEK AND LANDED ON HER FACE.  History of Present Illness  Left_ KNEE  ?  Knee joint pain, Worse with weightbearing  Slowly worsens with extended activity  Pain is increased by bending it and by twisting  complains of knee joint swelling and stiffness  Catching and grating during movement  ?  Review of Systems  Systemic: No fever, no chills, and no recent weight change.  Head: No headache - frequent.  Eyes: No vision problems.  Otolarnygeal: No hearing loss, no earache, no epistaxis, no hoarseness, and no tooth pain. Gums normal.  Cardiovascular: No chest pain or discomfort and no palpitations.  Pulmonary: No pulmonary symptoms - difficulty sleeping, no dyspnea, and cough not worse in the morning.  Gastrointestinal: Appetite not decreased. No dysphagia and no constant eructation. No nausea, no vomiting, no abdominal pain, no hematochezia, and no loose/mushy stools - frequent. No constipation - frequent.  Genitourinary: No genitourinary symptoms - Getting up every night to urinate and no increase in urinary frequency. No urinary hesitancy. No urinary loss of control - difficulty stopping urination and no burning sensation during urination.  Musculoskeletal: No calf muscle cramps and no localized soft tissue swelling of the ankle.  Neurological: No fainting and no convulsions.  Psychological: Not feeling nervous tension, not feeling nervous from exhaustion, and no depression.  Skin: No rash. Previous history of no ulcers.  Physical Exam  Vitals & Measurements  T:?97.6? ?F (Oral)? BP:?119/69?  HT:?148?cm? WT:?100.69?kg? BMI:?45.97?  PHYSICAL FINDINGS  Cardiovascular:  Arterial Pulses: Posterior tibialis pulses were normal left.  Dorsalis pedis pulses were normal left.  Musculoskeletal System:  Thigh:  Left Thigh: Thigh showed quadriceps atrophy.  Knee:  Left Knee: Examined.  Knee:  Grade in the knee: Value  Grade effusion 1  Genu varum. Patella demonstrated crepitus. Anteromedial aspect was tender on palpation. Medial aspect was tender on palpation. Medial collateral ligament was tender on palpation. Active motion.  Left Knee:  Left Knee Motion: Value  Active flexion 120_ degrees  Active extension 5_ degrees  Pain was elicited by flexion. No erythema. No warmth. No medial instability. No lateral instability. No one plane medial (straight) instability. No one plane lateral (straight) instability. A Lachman test did not demonstrate one plane anterior instability.  Neurological:  Gait And Stance: A left-sided antalgic gait was observed.  ?  ?  TESTS  Imaging:  X-Ray Knee:  A complete knee x-ray with standing views was performed -of left knee.  AP and lateral view x-rays of the left knee with sunrise view of the patella were performed -of left knee.  ?  ?  IMPRESSIONS RADIOLOGY TEST  Narrowing of the left knee joint space bone on bone, of the left knee joint space, and osteophytes arising from the left knee.  Assessment/Plan  1.?Essential hypertension(  Confirmed  )?I10  Ordered:  Office/Outpatient Visit Level 2 New 02499 PC, Essential hypertension(  Confirmed  )  Hypothyroidism(  Confirmed  )  Morbid obesity  Osteoarthritis of left knee, [facility], 06/12/19 13:20:00 CDT  ?  2.?Hypothyroidism(  Confirmed  )?E03.9  Ordered:  Office/Outpatient Visit Level 2 New 76099 PC, Essential hypertension(  Confirmed  )  Hypothyroidism(  Confirmed  )  Morbid obesity  Osteoarthritis of left knee, [facility] [place] [facility], 06/12/19 13:20:00 CDT  ?  3.?Morbid obesity?E66.01  ? Patient will be referred to the [facility] weight loss program?to achieve a BMI less than?40.  Ordered:  Office/Outpatient Visit Level 2 New 02867 PC,  Essential hypertension(  Confirmed  )  Hypothyroidism(  Confirmed  )  Morbid obesity  [facility], [facility], 06/12/19 13:20:00 CDT  ?  4.?Osteoarthritis of left knee?M17.12  ? Patient currently not a candidate for?total knee arthroplasty secondary to morbid obesity.  Ordered:  Office/Outpatient Visit Level 2 New 78747 PC, Essential hypertension(  Confirmed  )  Hypothyroidism(  Confirmed  )  Morbid obesity  Osteoarthritis of left knee, [facility], 06/12/19 13:20:00 CDT  ?  Orders:  Clinic Follow-up PRN, 06/12/19 13:20:00 CDT, Future Order, Orthopaedics  Referrals  Clinic Follow-up PRN, 06/12/19 13:20:00 CDT, [facility]   Problem List/Past Medical History  Ongoing  Essential hypertension(  Confirmed  )  Hypothyroidism(  Confirmed  )  Morbid obesity  Obesity(  Probable Diagnosis  )  Osteoarthritis  Osteoarthritis of left knee  Squamous cell carcinoma  Historical  No qualifying data  Procedure/Surgical History  Arthroscopy of knee with medial meniscus repair (03/17/2017)  Excision of squamous cell carcinoma (01/17/2017)  Pap smear and HPV cotesting (08/01/2016)  Colonoscopy (01/01/2015)  Arthroscopy of knee with medial and lateral meniscus repair  Excision of squamous cell carcinoma   Medications  acetaminophen-oxycodone 325 mg-5 mg oral tablet, 1 tab(s), Oral, q4hr, PRN,? ?Not taking: TAKES 0.5 TAB PRN Last Dose Date/Time Unknown  Advil 200 mg oral tablet, 400 mg= 2 tab(s), Oral, q4hr, PRN,? ?Not taking  albuterol 2.5 mg/3 mL (0.083%) inhalation solution,? ?Not Taking per Prescriber: PRN Last Dose Date/Time Unknown  cefdinir 300 mg oral capsule, 600 mg= 2 cap(s), Oral, Daily  Donnatal Extentabs oral tablet, extended release, 1 tab(s), Oral, Daily, PRN,? ?Not Taking per Prescriber: PRN Last Dose Date/Time Unknown  ergocalciferol 50,000 intUnit oral capsule, See Instructions, 2 refills,? ?Not taking: Last Dose Date/Time Unknown  finasteride 5  mg oral tablet, 5 mg= 1 tab(s), Oral, Daily  Flonase 50 mcg/inh nasal spray, 2 spray(s), Nasal, Daily,? ?Not Taking per Prescriber: PRN Last Dose Date/Time Unknown  furosemide 20 mg oral tablet, See Instructions,? ?Not taking: PRN Last Dose Date/Time Unknown  irbesartan 75 mg oral tablet, 75 mg= 1 tab(s), Oral, Daily  KlonoPIN 2 mg oral tablet, 3 mg= 1.5 tab(s), Oral, Once a day (at bedtime)  losartan 25 mg oral tablet, 25 mg= 1 tab(s), Oral, Daily, 4 refills,? ?Not Taking per Prescriber: Last Dose Date/Time Unknown  Lotrisone 1%-0.05% topical cream, 1 julian, TOP, BID, 1 refills,? ?Not taking: Last Dose Date/Time Unknown  MetroGel Pump 1% topical gel, 1 julian, TOP, Daily, 1 refills,? ?Not taking: Last Dose Date/Time Unknown  permethrin 5% topical cream,? ?Not taking: Last Dose Date/Time Unknown  potassium chloride 20 mEq oral TABLET extended release, 20 mEq= 1 tab(s), Oral, Daily, 2 refills,? ?Not taking: PRN Last Dose Date/Time Unknown  Protonix 40 mg ORAL enteric coated tablet, 40 mg= 1 tab(s), Oral, Daily, 6 refills  Qnasl 40 mcg/inh nasal spray, 1 spray(s), Nasal, Daily,? ?Not taking: prn Last Dose Date/Time Unknown  Synthroid 88 mcg (0.088 mg) oral tablet, See Instructions, 2 refills  triamcinolone 0.1% topical cream  Tylenol 8 HR Arthritis Pain 650 mg oral tablet, extended release, 1300 mg= 2 tab(s), Oral, q8hr, PRN,? ?Not taking: Last Dose Date/Time Unknown  Valium 5 mg oral tablet, 5 mg= 1 tab(s), Oral, TID, PRN,? ?Not taking: Last Dose Date/Time Unknown  VIT D 50,000 IU D2 (ERGO) CAPS (RX), See Instructions, 3 refills  Allergies  Levaquin?(TACHYCARDIA)  Lortab  Zithromax  penicillins  Social History  Alcohol  Current, 04/22/2015  Employment/School  Retired, 04/22/2015  Exercise  Exercise duration: 0., 04/22/2015  Home/Environment  Lives with Spouse., 04/22/2015  Nutrition/Health  Calorie restricted, 04/22/2015  Sexual  Sexually active: Yes., 04/22/2015  Substance Use  Never, 04/22/2015  Tobacco  Never (less  than 100 in lifetime), N/A, 06/11/2019  Family History  Unable to obtain family history  Health Maintenance  Health Maintenance  ???Pending?(in the next year)  ??? ??OverDue  ??? ? ? ?Breast Cancer Screening (Senior Wellness) due??08/04/17??and every 2??year(s)  ??? ? ? ?Tetanus Vaccine due??01/03/18??and every 10??year(s)  ??? ? ? ?Advance Directive due??01/01/19??and every 1??year(s)  ??? ? ? ?Fall Risk Assessment due??01/01/19??and every 1??year(s)  ??? ??Due?  ??? ? ? ?Bone Density Screening due??06/12/19??Variable frequency  ??? ? ? ?Pneumococcal Vaccine due??06/12/19??Variable frequency  ??? ? ? ?Pneumococcal Vaccine due??06/12/19??and every?  ??? ? ? ?Zoster Vaccine due??06/12/19??and every 100??year(s)  ??? ??Due In Future?  ??? ? ? ?Alcohol Misuse Screening not due until??01/01/20??and every 1??year(s)  ??? ? ? ?Cognitive Screening not due until??01/01/20??and every 1??year(s)  ??? ? ? ?Functional Assessment not due until??01/01/20??and every 1??year(s)  ??? ? ? ?Geriatric Depression Screening not due until??01/01/20??and every 1??year(s)  ??? ? ? ?Obesity Screening not due until??01/01/20??and every 1??year(s)  ??? ? ? ?Hypertension Management-BMP not due until??04/08/20??and every 1??year(s)  ??? ? ? ?Aspirin Therapy for CVD Prevention not due until??04/09/20??and every 1??year(s)  ??? ? ? ?Hypertension Management-Education not due until??04/09/20??and every 1??year(s)  ??? ? ? ?ADL Screening not due until??04/09/20??and every 1??year(s)  ??? ? ? ?Hypertension Management-Blood Pressure not due until??06/11/20??and every 1??year(s)  ???Satisfied?(in the past 1 year)  ??? ??Satisfied?  ??? ? ? ?ADL Screening on??04/09/19.??Satisfied by Jonathan Morataya LPN E.  ??? ? ? ?Alcohol Misuse Screening on??04/09/19.??Satisfied by Jonathan Morataya LPN E.  ??? ? ? ?Aspirin Therapy for CVD Prevention on??04/09/19.??Satisfied by Jonathan Morataya LPN E.  ??? ? ? ?Blood Pressure Screening on??06/12/19.??Satisfied by Segun Reddy  ???  ? ? ?Body Mass Index Check on??06/12/19.??Satisfied by Segun Reddy  ??? ? ? ?Cognitive Screening on??04/09/19.??Satisfied by Iglus LPN, Jonathan E.  ??? ? ? ?Depression Screening on??04/09/19.??Satisfied by Iglus LPN, Jonathan E.  ??? ? ? ?Diabetes Screening on??04/09/19.??Satisfied by Cristina Mares  ??? ? ? ?Fall Risk Assessment on??11/01/18.??Satisfied by Vidya LPN, Mena R.  ??? ? ? ?Functional Assessment on??04/09/19.??Satisfied by Iglus LPN, Jonathan E.  ??? ? ? ?Geriatric Depression Screening on??04/09/19.??Satisfied by Iglus LPN, Jonathan E.  ??? ? ? ?Hypertension Management-Blood Pressure on??06/12/19.??Satisfied by Segun Reddy  ??? ? ? ?Influenza Vaccine on??11/01/18.??Satisfied by Vidya ARAUJO, Mena R.  ??? ? ? ?Lipid Screening on??10/29/18.??Satisfied by Ny Prakash MT  ??? ? ? ?Obesity Screening on??06/12/19.??Satisfied by Segun Reddy  ??? ??Refused?  ??? ? ? ?Pneumococcal Vaccine (Senior Wellness) on??04/09/19.??Recorded by Ursula Willis NP??Reason: Patient Refuses  ?  ?

## 2022-05-04 NOTE — HISTORICAL [facility] CERNER
This is a historical note converted from [place]. Formatting and pictures may have been removed.  Please reference [facility] for original formatting and attached multimedia. Chief Complaint  Patient presents for follow up left knee injection 9/3/21.States had a fall on 10/7/21 and landed on right knee. States having constan tight, sharp pain in right knee. Tenderness in left knee. Reports also having pain in lower back.  History of Present Illness  Patient fell?about a week ago onto the anterior aspect of her right knee. ?She has a bruise. ?She said prior?right total knee arthroplasty.? Incidentally she states that she is?about to schedule weight loss surgery with Dr. Elmore. ?She does not want a left knee injection today?as originally planned.  Review of Systems  Systemic: No fever, no chills, and no recent weight change.  Head: No headache - frequent.  Eyes: No vision problems.  Otolarnygeal: No hearing loss, no earache, no epistaxis, no hoarseness, and no tooth pain. Gums normal.  Cardiovascular: No chest pain or discomfort and no palpitations.  Pulmonary: No pulmonary symptoms - difficulty sleeping, no dyspnea, and cough not worse in the morning.  Gastrointestinal: Appetite not decreased. No dysphagia and no constant eructation. No nausea, no vomiting, no abdominal pain, no hematochezia, and no loose/mushy stools - frequent. No constipation - frequent.  Genitourinary: No genitourinary symptoms - Getting up every night to urinate and no increase in urinary frequency. No urinary hesitancy. No urinary loss of control - difficulty stopping urination and no burning sensation during urination.  Musculoskeletal: No calf muscle cramps and no localized soft tissue swelling of the ankle.  Neurological: No fainting and no convulsions.  Psychological: Not feeling nervous tension, not feeling nervous from exhaustion, and no depression.  Skin: No rash. Previous history of no ulcers.  Physical Exam  Vitals &  Measurements  T:?97.7? ?F (Oral)? HR:?106(Peripheral)? BP:?138/77?  HT:?150.00?cm? WT:?98.420?kg? BMI:?43.74?  Left knee exam:  PHYSICAL FINDINGS  Cardiovascular:  Arterial Pulses: Posterior tibialis pulses were normal left. Dorsalis pedis pulses were normal left.  Musculoskeletal System:  Thigh:  Left Thigh: Thigh showed quadriceps atrophy.  Knee:  Left Knee: Examined.  Knee:  Grade in the knee: Value  Grade effusion 1  Genu varum. Patella demonstrated crepitus. Anteromedial aspect was tender on palpation. Medial aspect was tender on palpation. Medial collateral ligament was tender on palpation. Active motion.  Left Knee:  Left Knee Motion: Value?  Active flexion 120_ degrees  Active extension 5_ degrees  Pain was elicited by flexion. No erythema. No warmth. No medial instability. No lateral instability. No one plane medial (straight) instability. No one plane lateral (straight) instability. A Lachman test did not demonstrate one plane anterior instability.  Neurological:  Gait And Stance: A left-sided antalgic gait was observed.  ?  ?  TESTS  Imaging:  X-Ray Knee:  A complete knee x-ray with standing views was performed -of left knee.  AP and lateral view x-rays of the left knee with sunrise view of the patella were performed -of left knee.  ?  ?  IMPRESSIONS RADIOLOGY TEST  Narrowing of the left knee joint space bone on bone, of the left knee joint space, and osteophytes arising from the left knee.? Varus deformity. ?Bone-on-bone in the patellofemoral and medial compartments. ?Tricompartmental osteophytes.  ?  Right knee exam:  ?  Cardiovascular:  Arterial Pulses: ? Dorsalis pedis pulses were normal.  Musculoskeletal System:  Right Knee:  General: ? Mild swelling over the anterior aspect of the right knee with associated?bruising.? No?defect in the extensor mechanism. ?Active range of motion 0 degrees to 120 degrees. ?No pathologic laxity of the collateral ligaments.? Symmetric ligament balancing throughout range  of motion. ? No warmth of the knee. ? No pain was elicited by motion of the knee. ? No instability of the knee. ? Knees demonstrated no muscle weakness.  Right Knee: ? Motion was normal.  Active flexion _120 degrees  Active extension 0 degrees  Neurological:  Sensation: ??Normal.  Motor (Strength): ? No weakness of the right knee was observed.  Skin:  ? No cellulitis. Surgical incision well healed ?  Tests  Imaging:  X-Ray Knee:  A complete knee x-ray with standing views was performed -of right knee.  Impressions Radiology Test  X-ray of knee was performed intact right knee implant.? No evidence of fracture or dislocation.? Pristine interfaces and a stable well aligned right total knee arthroplasty.  ?  ?  ?  Assessment/Plan  1.?History of arthroplasty of right knee?Z96.651  ?No fracture or dislocation.? Rest, ice,?patient will take Advil over-the-counter as directed for pain.  Ordered:  Clinic Follow-up PRN, 10/11/21 14:21:00 CDT, Future Order, Orthopaedics  Office/Outpatient Visit Level 3 Established [id number] PC, History of arthroplasty of right knee  Morbid obesity  Osteoarthritis of left knee  Contusion of right knee, [facility], 10/11/21 14:21:00 CDT  ?  2.?Morbid obesity?E66.01  ?Patient is scheduling weight loss surgery  Ordered:  Clinic Follow-up PRN, 10/11/21 14:21:00 CDT, Future Order, [facility]  Office/Outpatient Visit Level 3 Established 95099 PC, History of arthroplasty of right knee  Morbid obesity  Osteoarthritis of left knee  Contusion of right knee, [facility], 10/11/21 14:21:00 CDT  ?  3.?Osteoarthritis of left knee?M17.12  Ordered:  Clinic Follow-up PRN, 10/11/21 14:21:00 CDT, Future Order, Orthopaedics  Office/Outpatient Visit Level 3 Established 83170 PC, History of arthroplasty of right knee  Morbid obesity  Osteoarthritis of left knee  Contusion of right knee, [facility], 10/11/21 14:21:00 CDT  ?  4.?Contusion of right  knee?S80.01XA  Ordered:  Clinic Follow-up PRN, 10/11/21 14:21:00 CDT, [facility]  Office/Outpatient Visit Level 3 Established 56444 PC, History of arthroplasty of right knee  Morbid obesity  Osteoarthritis of left knee  Contusion of right knee, [facility], 10/11/21 14:21:00 CDT  ?  Referrals  Clinic Follow-up PRN, 10/11/21 14:21:00 CDT, [facility]   Problem List/Past Medical History  Ongoing  Arthritis of carpometacarpal (CMC) joint of left thumb  Contusion of right knee  Essential hypertension(  Confirmed  )  History of arthroplasty of right knee  Hypothyroidism(  Confirmed  )  Morbid obesity  Obesity(  Probable Diagnosis  )  Osteoarthritis  Osteoarthritis of left knee  Patellar tendonitis of right knee  Right knee pain  Squamous cell carcinoma  Historical  No qualifying data  Procedure/Surgical History  26023- EGD FLEX TRANSORAL DX W/BRUSHING/WASHING (None) (08/04/2021)  Esophagogastroduodenoscopy, flexible, transoral; diagnostic, including collection of specimen(s) by brushing or washing, when performed (separate procedure) (08/04/2021)  Inspection of Upper Intestinal Tract, Via Natural or Artificial Opening Endoscopic (08/04/2021)  Release of right trigger finger (11/06/2019)  Release of left trigger finger (09/23/2019)  Arthroscopy of knee with medial meniscus repair (03/17/2017)  Excision of squamous cell carcinoma (01/17/2017)  Pap smear and HPV cotesting (08/01/2016)  Colonoscopy (01/01/2015)  Arthroscopy of knee with medial and lateral meniscus repair  Excision of squamous cell carcinoma   Medications  albuterol 2.5 mg/3 mL (0.083%) inhalation solution,? ?Not taking: PRN  Amoxil 875 mg oral tablet, 875 mg= 1 tab(s), Oral, BID,? ?Not taking  dexamethasone-tobramycin 0.1%-0.3% ophthalmic suspension, 2 drop(s), Eye-Right, QID,? ?Not taking  doxycycline monohydrate 100 mg oral tablet, 100 mg= 1 tab(s), Oral, BID,? ?Not taking  ergocalciferol 50,000 intl  units (1.25 mg) oral capsule, 87431 IntUnit= 1 cap(s), Oral, qWeek, 4 refills  fluticasone 50 mcg/inh nasal spray, 2 spray(s), Both Nostrils, Daily  furosemide 20 mg oral tablet, See Instructions  irbesartan 75 mg oral tablet, 75 mg= 1 tab(s), Oral, Daily, 4 refills  KlonoPIN 2 mg oral tablet, 4 mg= 2 tab(s), Oral, Once a day (at bedtime), 2 refills  Lexapro 10 mg oral tablet, 10 mg= 1 tab(s), Oral, Daily, 5 refills  physical therapy, See Instructions  Protonix 40 mg ORAL enteric coated tablet, 40 mg= 1 tab(s), Oral, Daily, 6 refills  VIT D 50,000 IU D2 (ERGO) CAPS (RX), See Instructions, 3 refills  Allergies  Levaquin?(TACHYCARDIA)  Lortab  Zithromax  penicillins  Social History  Abuse/Neglect  No, No, Yes, 10/11/2021  Alcohol  Current, 1-2 times per month, 10/11/2021  Employment/School  Retired, 04/22/2015  Exercise  Exercise duration: 0., 04/22/2015  Home/Environment  Lives with Spouse., 04/22/2015  Nutrition/Health  Calorie restricted, 04/22/2015  Sexual  Sexually active: Yes., 04/22/2015  Substance Use  Never, 04/22/2015  Tobacco  Never (less than 100 in lifetime), No, 10/11/2021  Family History  Unable to obtain family history  Immunizations  Vaccine Date Status   COVID-19 mRNA, LNP-S, PF - Moderna 03/10/2021 Recorded   COVID-19 mRNA, LNP-S, PF - Moderna 02/10/2021 Recorded   Health Maintenance  Health Maintenance  ???Pending?(in the next year)  ??? ??OverDue  ??? ? ? ?Tetanus Vaccine due??01/03/18??and every 10??year(s)  ??? ? ? ?Hypertension Management-Education due??04/09/20??and every 1??year(s)  ??? ??Due?  ??? ? ? ?Coronary Artery Disease Maintenance-Antiplatelet Agent Prescribed due??10/11/21??Unknown Frequency  ??? ? ? ?Pneumococcal Vaccine due??10/11/21??Unknown Frequency  ??? ? ? ?Zoster Vaccine due??10/11/21??Unknown Frequency  ??? ??Refused?  ??? ? ? ?Bone Density Screening due??12/18/19??and every 1??day(s)  ??? ??Due In Future?  ??? ? ? ?Obesity Screening not due until??01/01/22??and every  1??year(s)  ??? ? ? ?Advance Directive not due until??01/02/22??and every 1??year(s)  ??? ? ? ?Alcohol Misuse Screening not due until??01/02/22??and every 1??year(s)  ??? ? ? ?Cognitive Screening not due until??01/02/22??and every 1??year(s)  ??? ? ? ?Fall Risk Assessment not due until??01/02/22??and every 1??year(s)  ??? ? ? ?Functional Assessment not due until??01/02/22??and every 1??year(s)  ??? ? ? ?ADL Screening not due until??01/18/22??and every 1??year(s)  ??? ? ? ?Medicare Annual Wellness Exam not due until??01/18/22??and every 1??year(s)  ??? ? ? ?Aspirin Therapy for CVD Prevention not due until??01/18/22??and every 1??year(s)  ??? ? ? ?Hypertension Management-BMP not due until??07/13/22??and every 1??year(s)  ???Satisfied?(in the past 1 year)  ??? ??Satisfied?  ??? ? ? ?ADL Screening on??01/18/21.??Satisfied by Mamie Waite LPN  ??? ? ? ?Advance Directive on??08/04/21.??Satisfied by Christine Bell RN  ??? ? ? ?Alcohol Misuse Screening on??04/22/21.??Satisfied by Dana Rob  ??? ? ? ?Aspirin Therapy for CVD Prevention on??01/18/21.??Satisfied by Mamie Waite LPN  ??? ? ? ?Blood Pressure Screening on??10/11/21.??Satisfied by Socorro Vila  ??? ? ? ?Body Mass Index Check on??10/11/21.??Satisfied by Socorro Vila  ??? ? ? ?Breast Cancer Screening on??08/23/21.??Satisfied by Dylan Torres  ??? ? ? ?Cervical Cancer Screening on??02/11/21.??Satisfied by April Prakash  ??? ? ? ?Cognitive Screening on??04/22/21.??Satisfied by Dana Rob  ??? ? ? ?Depression Screening on??10/11/21.??Satisfied by Socorro Vila  ??? ? ? ?Diabetes Screening on??07/13/21.??Satisfied by Caro Mosher  ??? ? ? ?Fall Risk Assessment on??10/11/21.??Satisfied by Socorro Vila  ??? ? ? ?Functional Assessment on??09/03/21.??Satisfied by Vero Loyola  ??? ? ? ?Hypertension Management-Blood Pressure on??10/11/21.??Satisfied by Socorro Vila  ??? ? ? ?Influenza Vaccine  on??10/11/21.??Satisfied by Socorro Vila  ??? ? ? ?Lipid Screening on??07/13/21.??Satisfied by Caro Mosher  ??? ? ? ?Medicare Annual Wellness Exam on??01/18/21.??Satisfied by Amrit Vogt II, MD  ??? ? ? ?Obesity Screening on??10/11/21.??Satisfied by Socorro Vila  ??? ??Refused?  ??? ? ? ?Bone Density Screening on??04/22/21.??Recorded by Dana Rob  ?

## 2022-05-04 NOTE — HISTORICAL [facility]
This is a historical note converted from Reyna. Formatting and pictures may have been removed.  Please reference Reyna for original formatting and attached multimedia. Chief Complaint  1 week f/u left distal radius fx with new x-ray, cast in place but patient states she would like to re do cast if she can due to discomfort...cv  History of Present Illness  Patient is doing well  No complaints  Review of Systems  Comprehensive review of systems is unchanged  Physical Exam  Vitals & Measurements  T:?36.0? ?C (Oral)? HR:?90(Peripheral)? BP:?130/70?  HT:?148.00?cm? WT:?98.600?kg? BMI:?45.01?  Radiographs of the?left wrist show maintenance of acceptable alignment and reduction?of the distal radius?fracture  Good finger motion  No swelling of the fingers  Neurovascular intact with normal sensation and perfusion  Assessment/Plan  1.?Distal radius fracture, left?S52.502A  ?Follow-up in 1 month for out of cast x-rays AP and lateral left wrist  Ordered:  Clinic Follow up, *Est. 12/02/20 3:00:00 CST, Order for future visit, Distal radius fracture, left, [facility]  Post-Op follow-up visit [id number] PC, Distal radius fracture, left, [facility], 11/02/20 10:19:00 CST  XR Wrist Left 2 Views, Routine, *Est. 12/02/20 3:00:00 CST, None, Ambulatory, Rad Type, Order for future visit, Distal radius fracture, left, Not Scheduled, *Est. 12/02/20 3:00:00 CST  XR Wrist Left Minimum 3 Views, Routine, 11/02/20 9:54:00 CST, None, Ambulatory, Rad Type, Distal radius fracture, left, Not Scheduled, 11/02/20 9:54:00 CST  ?  Referrals  Clinic Follow up, *Est. 12/02/20 3:00:00 CST, Order for future visit, Distal radius fracture, left, [facility]   Problem List/Past Medical History  Ongoing  Essential hypertension(  Confirmed  )  History of arthroplasty of right knee  Hypothyroidism(  Confirmed  )  Morbid obesity  Obesity(  Probable Diagnosis  )  Osteoarthritis  Osteoarthritis of left knee  Patellar tendonitis of right  knee  Squamous cell carcinoma  Historical  No qualifying data  Procedure/Surgical History  Release of right trigger finger (11/06/2019)  Release of left trigger finger (09/23/2019)  Arthroscopy of knee with medial meniscus repair (03/17/2017)  Excision of squamous cell carcinoma (01/17/2017)  Pap smear and HPV cotesting (08/01/2016)  Colonoscopy (01/01/2015)  Arthroscopy of knee with medial and lateral meniscus repair  Excision of squamous cell carcinoma   Medications  albuterol 2.5 mg/3 mL (0.083%) inhalation solution  furosemide 20 mg oral tablet, See Instructions  irbesartan 75 mg oral tablet, 75 mg= 1 tab(s), Oral, Daily,? ?Still taking, not as prescribed: every 3 days.  KlonoPIN 2 mg oral tablet, 4 mg= 2 tab(s), Oral, Once a day (at bedtime), 2 refills  Lotrisone 1%-0.05% topical cream, 1 julian, TOP, BID  meloxicam 7.5 mg oral tablet, 7.5 mg= 1 tab(s), Oral, Daily,? ?Not Taking, Completed Rx: Last Dose Date/Time Unknown. done  Mobic 7.5 mg oral tablet, 7.5 mg= 1 tab(s), Oral, BID, 5 refills,? ?Not taking  Mobic 7.5 mg oral tablet, 7.5 mg= 1 tab(s), Oral, Daily,? ?Not taking  Protonix 40 mg ORAL enteric coated tablet, 40 mg= 1 tab(s), Oral, Daily, 6 refills  Synthroid 88 mcg (0.088 mg) oral tablet, See Instructions, 2 refills,? ?Not taking  traMADol 50 mg oral tablet, 50 mg= 1 tab(s), Oral, q8hr  VIT D 50,000 IU D2 (ERGO) CAPS (RX), See Instructions, 3 refills  Voltaren 1% topical gel, 2 gm, TOP, QID, 5 refills  Allergies  Levaquin?(TACHYCARDIA)  Lortab  Zithromax  penicillins  Social History  Abuse/Neglect  No, No, Yes, 11/02/2020  Alcohol  Current, 04/22/2015  Employment/School  Retired, 04/22/2015  Exercise  Exercise duration: 0., 04/22/2015  Home/Environment  Lives with Spouse., 04/22/2015  Nutrition/Health  Calorie restricted, 04/22/2015  Sexual  Sexually active: Yes., 04/22/2015  Substance Use  Never, 04/22/2015  Tobacco  Never (less than 100 in lifetime), N/A, 11/02/2020  Family History  Unable to obtain  family history  Health Maintenance  Health Maintenance  ???Pending?(in the next year)  ??? ??OverDue  ??? ? ? ?Tetanus Vaccine due??01/03/18??and every 10??year(s)  ??? ? ? ?Bone Density Screening due??12/18/19??and every 1??day(s)  ??? ? ? ?Advance Directive due??01/02/20??and every 1??year(s)  ??? ? ? ?Cognitive Screening due??01/02/20??and every 1??year(s)  ??? ? ? ?Aspirin Therapy for CVD Prevention due??04/09/20??and every 1??year(s)  ??? ? ? ?Hypertension Management-Education due??04/09/20??and every 1??year(s)  ??? ? ? ?ADL Screening due??04/09/20??and every 1??year(s)  ??? ??Due?  ??? ? ? ?Influenza Vaccine due??10/01/20??and every 1??day(s)  ??? ? ? ?Coronary Artery Disease Maintenance-Antiplatelet Agent Prescribed due??11/02/20??Unknown Frequency  ??? ? ? ?Coronary Artery Disease Maintenance-Lipid Lowering Therapy due??11/02/20??Unknown Frequency  ??? ? ? ?Pneumococcal Vaccine due??11/02/20??Unknown Frequency  ??? ? ? ?Zoster Vaccine due??11/02/20??Unknown Frequency  ??? ??Near Due?  ??? ? ? ?Medicare Annual Wellness Exam near due??12/17/20??and every 1??year(s)  ??? ??Due In Future?  ??? ? ? ?Hypertension Management-BMP not due until??12/12/20??and every 1??year(s)  ??? ? ? ?Obesity Screening not due until??01/01/21??and every 1??year(s)  ??? ? ? ?Alcohol Misuse Screening not due until??01/02/21??and every 1??year(s)  ??? ? ? ?Fall Risk Assessment not due until??01/02/21??and every 1??year(s)  ??? ? ? ?Functional Assessment not due until??01/02/21??and every 1??year(s)  ??? ? ? ?Breast Cancer Screening not due until??09/16/21??and every 2??year(s)  ??? ? ? ?Depression Screening not due until??10/26/21??and every 1??year(s)  ???Satisfied?(in the past 1 year)  ??? ??Satisfied?  ??? ? ? ?Alcohol Misuse Screening on??07/06/20.??Satisfied by Blanca Merchant LPN  ??? ? ? ?Blood Pressure Screening on??11/02/20.??Satisfied by Varsha Brown LPN  ??? ? ? ?Body Mass Index Check on??11/02/20.??Satisfied by  Varsha Brown LPN  ??? ? ? ?Bone Density Screening on??12/17/19.??Satisfied by Ursula Willis NP  ??? ? ? ?Depression Screening on??10/26/20.??Satisfied by Blanca Merchant LPN  ??? ? ? ?Diabetes Screening on??05/15/20.??Satisfied by Dylan Bravo  ??? ? ? ?Fall Risk Assessment on??11/02/20.??Satisfied by Varsha Brown LPN  ??? ? ? ?Functional Assessment on??11/02/20.??Satisfied by Varsha Brown LPN  ??? ? ? ?Hypertension Management-Blood Pressure on??11/02/20.??Satisfied by Varsha Brown LPN  ??? ? ? ?Lipid Screening on??05/15/20.??Satisfied by Dylan Bravo  ??? ? ? ?Medicare Annual Wellness Exam on??12/17/19.??Satisfied by Ursula Willis NP  ??? ? ? ?Obesity Screening on??11/02/20.??Satisfied by Varsha Brown LPN  ?

## 2022-05-05 ENCOUNTER — TELEPHONE (OUTPATIENT)
Dept: INTERNAL MEDICINE | Facility: CLINIC | Age: 67
End: 2022-05-05
Payer: MEDICARE

## 2022-05-06 ENCOUNTER — TELEPHONE (OUTPATIENT)
Dept: INTERNAL MEDICINE | Facility: CLINIC | Age: 67
End: 2022-05-06
Payer: MEDICARE

## 2022-05-27 ENCOUNTER — CLINICAL SUPPORT (OUTPATIENT)
Dept: BARIATRICS | Facility: HOSPITAL | Age: 67
End: 2022-05-27

## 2022-05-27 VITALS — BODY MASS INDEX: 38.53 KG/M2 | WEIGHT: 191.13 LBS

## 2022-05-27 NOTE — PROGRESS NOTES
Date:     POST OP BARIATRIC NUTRITION FOLLOW UP    Type of surgery: sleeve gastrectomy   Post-op visit:   [] 2 weeks  [] 4 weeks:  [] 2 months:  [x] 4 months:  [] 6 months:  [] 9 months:  [] 1 year:   [] Other:     Weight:  191lb     Post op complications:   [x] Yes [] No  If yes: [] Nausea   [] Vomiting   [] Constipation    [] Diarrhea    [] Other:  Pt having issues with swallowing : EDG end of June     Dietary tolerance:  [x] Yes [] No [] Comment:     Adequate fluid intake:  [x] Yes [] No Approx. daily fluid intake:  69oz  Adequate protein intake:  [] Yes [x] No  Approx. daily protein intake: 30-40 pt knows she is low on protein   Vitamins/Minerals:   [] MVI:    [] Biotin:  [x] Calcium: caltrate     [] Hair/Nails:  [] B 50 complex:   [x] Bariatric Specific MVI:    [] B12:    [] Bariatric Specific Calcium:  [] Iron:    [] Other:   [] Non-compliance:        Labs:  NA  Comment:    Expected compliance:  [x]Good  []Fair  []Poor      Progress:   [x]Patient progressing well at this time with no complaints   [] Patient expressed complaint at this time: See additional notes       Bariatric Diet Education:   Verbalizes understanding Demonstrates  Needs further teaching Needs practice/ supervision Comments    Bariatric Clear [] [] [] []    Bariatric Full [] [] [] []    Bariatric Puree [] [] [] []    Bariatric Soft [] [] [] []    Bariatric Regular [x] [] [] []    Bariatric Reintroduction of Starchy CHO [] [] [] []    Bariatric: Mindful Eating [] [] [] []    Importance of Protein and Vitamin Protocol [] [] [] []    Other:            Additional Notes:  Pt will add in a snack with protein to increase protein needs   Pt drinking home made rootbeer that is not not sugar free. Pt will limit sugar beverage.     B: egg 2 bar   L: crawfish 1-2oz   D: Salad ham and turkey

## 2022-07-19 ENCOUNTER — LAB VISIT (OUTPATIENT)
Dept: LAB | Facility: HOSPITAL | Age: 67
End: 2022-07-19
Attending: SURGERY
Payer: MEDICARE

## 2022-07-19 DIAGNOSIS — E53.8 VITAMIN B 12 DEFICIENCY: ICD-10-CM

## 2022-07-19 DIAGNOSIS — E51.9 VITAMIN B1 DEFICIENCY: ICD-10-CM

## 2022-07-19 DIAGNOSIS — D64.9 ANEMIA, UNSPECIFIED TYPE: Primary | ICD-10-CM

## 2022-07-19 DIAGNOSIS — E56.9 VITAMIN DEFICIENCY: ICD-10-CM

## 2022-07-19 LAB
ALBUMIN SERPL-MCNC: 3.4 GM/DL (ref 3.4–4.8)
ALBUMIN/GLOB SERPL: 0.9 RATIO (ref 1.1–2)
ALP SERPL-CCNC: 92 UNIT/L (ref 40–150)
ALT SERPL-CCNC: 20 UNIT/L (ref 0–55)
AST SERPL-CCNC: 18 UNIT/L (ref 5–34)
BILIRUBIN DIRECT+TOT PNL SERPL-MCNC: 0.4 MG/DL
BUN SERPL-MCNC: 13.1 MG/DL (ref 9.8–20.1)
CALCIUM SERPL-MCNC: 9.7 MG/DL (ref 8.4–10.2)
CHLORIDE SERPL-SCNC: 106 MMOL/L (ref 98–107)
CO2 SERPL-SCNC: 27 MMOL/L (ref 23–31)
CREAT SERPL-MCNC: 0.74 MG/DL (ref 0.55–1.02)
ERYTHROCYTE [DISTWIDTH] IN BLOOD BY AUTOMATED COUNT: 11.9 % (ref 11.5–17)
GLOBULIN SER-MCNC: 4 GM/DL (ref 2.4–3.5)
GLUCOSE SERPL-MCNC: 98 MG/DL (ref 82–115)
HCT VFR BLD AUTO: 41.8 % (ref 37–47)
HGB BLD-MCNC: 13.7 GM/DL (ref 12–16)
IRON SATN MFR SERPL: 41 % (ref 20–50)
IRON SERPL-MCNC: 85 UG/DL (ref 50–170)
MCH RBC QN AUTO: 30.7 PG (ref 27–31)
MCHC RBC AUTO-ENTMCNC: 32.8 MG/DL (ref 33–36)
MCV RBC AUTO: 93.7 FL (ref 80–94)
NRBC BLD AUTO-RTO: 0 %
PLATELET # BLD AUTO: 233 X10(3)/MCL (ref 130–400)
PMV BLD AUTO: 10.7 FL (ref 7.4–10.4)
POTASSIUM SERPL-SCNC: 3.9 MMOL/L (ref 3.5–5.1)
PROT SERPL-MCNC: 7.4 GM/DL (ref 5.8–7.6)
RBC # BLD AUTO: 4.46 X10(6)/MCL (ref 4.2–5.4)
SODIUM SERPL-SCNC: 145 MMOL/L (ref 136–145)
TIBC SERPL-MCNC: 123 UG/DL (ref 70–310)
TIBC SERPL-MCNC: 208 UG/DL (ref 250–450)
VIT B12 SERPL-MCNC: 615 PG/ML (ref 213–816)
WBC # SPEC AUTO: 8.7 X10(3)/MCL (ref 4.5–11.5)

## 2022-07-19 PROCEDURE — 36415 COLL VENOUS BLD VENIPUNCTURE: CPT

## 2022-07-19 PROCEDURE — 85027 COMPLETE CBC AUTOMATED: CPT

## 2022-07-19 PROCEDURE — 84425 ASSAY OF VITAMIN B-1: CPT

## 2022-07-19 PROCEDURE — 82607 VITAMIN B-12: CPT

## 2022-07-19 PROCEDURE — 80053 COMPREHEN METABOLIC PANEL: CPT

## 2022-07-19 PROCEDURE — 83540 ASSAY OF IRON: CPT

## 2022-07-22 LAB — VIT B1 BLD-SCNC: 134 NMOL/L (ref 70–180)

## 2022-07-28 ENCOUNTER — CLINICAL SUPPORT (OUTPATIENT)
Dept: BARIATRICS | Facility: HOSPITAL | Age: 67
End: 2022-07-28

## 2022-07-28 NOTE — PROGRESS NOTES
Date:     POST OP BARIATRIC NUTRITION FOLLOW UP    Type of surgery: sleeve gastrectomy   Post-op visit:   [] 2 weeks  [] 4 weeks:  [] 2 months:  [] 4 months:  [x] 6 months:  [] 9 months:  [] 1 year:   [] Other:     Weight:  183lb    Post op complications:   [] Yes [x] No  If yes: [] Nausea   [] Vomiting   [] Constipation    [] Diarrhea    [] Other:     Dietary tolerance:  [] Yes [x] No [] Comment:     Adequate fluid intake:  [] Yes [x] No Approx. daily fluid intake: 50oz  Adequate protein intake:  [] Yes [x] No  Approx. daily protein intake: 40-50g    Vitamins/Minerals:   [x] MVI:    [] Biotin:  [x] Calcium:    [] Hair/Nails:  [] B 50 complex:   [] Bariatric Specific MVI:  [] B12:    [] Bariatric Specific Calcium:  [x] Iron:    [] Other:   [] Non-compliance:      Need to start b vitamins.     Labs: WNL  Comment:    Expected compliance:  [x]Good  []Fair  []Poor      Progress:   [x]Patient progressing well at this time with no complaints   [] Patient expressed complaint at this time: See additional notes       Bariatric Diet Education:   Verbalizes understanding Demonstrates  Needs further teaching Needs practice/ supervision Comments    Bariatric Clear [] [] [] []    Bariatric Full [] [] [] []    Bariatric Puree [] [] [] []    Bariatric Soft [] [] [] []    Bariatric Regular [x] [] [] []    Bariatric Reintroduction of Starchy CHO [x] [] [] []    Bariatric: Mindful Eating [] [] [] []    Importance of Protein and Vitamin Protocol [] [] [] []    Other:            Additional Notes:  Pt might be a little low on protein intake, discussed adding in snacks and increase water.       B: egg and 1 slice   L: salad with ham   D: 2oz steak

## 2022-09-01 ENCOUNTER — DOCUMENTATION ONLY (OUTPATIENT)
Dept: ADMINISTRATIVE | Facility: HOSPITAL | Age: 67
End: 2022-09-01
Payer: MEDICARE

## 2022-09-14 ENCOUNTER — TELEPHONE (OUTPATIENT)
Dept: INTERNAL MEDICINE | Facility: CLINIC | Age: 67
End: 2022-09-14
Payer: MEDICARE

## 2022-09-14 DIAGNOSIS — E03.9 HYPOTHYROIDISM, UNSPECIFIED TYPE: Primary | ICD-10-CM

## 2022-09-14 NOTE — TELEPHONE ENCOUNTER
----- Message from Christine Feliciano Patient Care Assistant sent at 9/14/2022  3:25 PM CDT -----  Regarding: RE: kwame tom 9/21 @ 10:40  Pt. Confirmed   ----- Message -----  From: Manuel Barr LPN  Sent: 9/14/2022  11:53 AM CDT  To: Christine Feliciano Patient Care Assistant  Subject: kwame tom 9/21 @ 10:40                         Are there any outstanding tasks in patient chart? Needs fasting labs    Is there documentation of outstanding tasks in patient chart? no    Has patient been to the ER, urgent care, or another physician since last visit?    Has patient done any blood work or x-rays since last visit?

## 2022-09-22 ENCOUNTER — LAB VISIT (OUTPATIENT)
Dept: LAB | Facility: HOSPITAL | Age: 67
End: 2022-09-22
Attending: INTERNAL MEDICINE
Payer: MEDICARE

## 2022-09-22 DIAGNOSIS — E03.9 HYPOTHYROIDISM, UNSPECIFIED TYPE: ICD-10-CM

## 2022-09-22 LAB
T4 FREE SERPL-MCNC: 0.94 NG/DL (ref 0.7–1.48)
TSH SERPL-ACNC: 3.76 UIU/ML (ref 0.35–4.94)

## 2022-09-22 PROCEDURE — 84439 ASSAY OF FREE THYROXINE: CPT

## 2022-09-22 PROCEDURE — 36415 COLL VENOUS BLD VENIPUNCTURE: CPT

## 2022-09-22 PROCEDURE — 84443 ASSAY THYROID STIM HORMONE: CPT

## 2022-09-26 ENCOUNTER — OFFICE VISIT (OUTPATIENT)
Dept: INTERNAL MEDICINE | Facility: CLINIC | Age: 67
End: 2022-09-26
Payer: MEDICARE

## 2022-09-26 VITALS
DIASTOLIC BLOOD PRESSURE: 76 MMHG | WEIGHT: 179 LBS | HEIGHT: 59 IN | RESPIRATION RATE: 18 BRPM | SYSTOLIC BLOOD PRESSURE: 122 MMHG | OXYGEN SATURATION: 96 % | HEART RATE: 67 BPM | BODY MASS INDEX: 36.08 KG/M2

## 2022-09-26 DIAGNOSIS — E66.01 MORBID OBESITY: Primary | ICD-10-CM

## 2022-09-26 DIAGNOSIS — M79.7 FIBROMYALGIA: ICD-10-CM

## 2022-09-26 DIAGNOSIS — M32.9 SYSTEMIC LUPUS ERYTHEMATOSUS, UNSPECIFIED SLE TYPE, UNSPECIFIED ORGAN INVOLVEMENT STATUS: ICD-10-CM

## 2022-09-26 DIAGNOSIS — I10 HYPERTENSION, UNSPECIFIED TYPE: ICD-10-CM

## 2022-09-26 DIAGNOSIS — I82.4Y9 DEEP VEIN THROMBOSIS (DVT) OF PROXIMAL LOWER EXTREMITY, UNSPECIFIED CHRONICITY, UNSPECIFIED LATERALITY: ICD-10-CM

## 2022-09-26 PROCEDURE — 99213 OFFICE O/P EST LOW 20 MIN: CPT | Mod: ,,, | Performed by: INTERNAL MEDICINE

## 2022-09-26 PROCEDURE — 3008F PR BODY MASS INDEX (BMI) DOCUMENTED: ICD-10-PCS | Mod: CPTII,,, | Performed by: INTERNAL MEDICINE

## 2022-09-26 PROCEDURE — 3078F PR MOST RECENT DIASTOLIC BLOOD PRESSURE < 80 MM HG: ICD-10-PCS | Mod: CPTII,,, | Performed by: INTERNAL MEDICINE

## 2022-09-26 PROCEDURE — 1160F RVW MEDS BY RX/DR IN RCRD: CPT | Mod: CPTII,,, | Performed by: INTERNAL MEDICINE

## 2022-09-26 PROCEDURE — 1160F PR REVIEW ALL MEDS BY PRESCRIBER/CLIN PHARMACIST DOCUMENTED: ICD-10-PCS | Mod: CPTII,,, | Performed by: INTERNAL MEDICINE

## 2022-09-26 PROCEDURE — 3008F BODY MASS INDEX DOCD: CPT | Mod: CPTII,,, | Performed by: INTERNAL MEDICINE

## 2022-09-26 PROCEDURE — 1159F PR MEDICATION LIST DOCUMENTED IN MEDICAL RECORD: ICD-10-PCS | Mod: CPTII,,, | Performed by: INTERNAL MEDICINE

## 2022-09-26 PROCEDURE — 4010F ACE/ARB THERAPY RXD/TAKEN: CPT | Mod: CPTII,,, | Performed by: INTERNAL MEDICINE

## 2022-09-26 PROCEDURE — 99213 PR OFFICE/OUTPT VISIT, EST, LEVL III, 20-29 MIN: ICD-10-PCS | Mod: ,,, | Performed by: INTERNAL MEDICINE

## 2022-09-26 PROCEDURE — 3074F PR MOST RECENT SYSTOLIC BLOOD PRESSURE < 130 MM HG: ICD-10-PCS | Mod: CPTII,,, | Performed by: INTERNAL MEDICINE

## 2022-09-26 PROCEDURE — 3288F FALL RISK ASSESSMENT DOCD: CPT | Mod: CPTII,,, | Performed by: INTERNAL MEDICINE

## 2022-09-26 PROCEDURE — 3074F SYST BP LT 130 MM HG: CPT | Mod: CPTII,,, | Performed by: INTERNAL MEDICINE

## 2022-09-26 PROCEDURE — 3288F PR FALLS RISK ASSESSMENT DOCUMENTED: ICD-10-PCS | Mod: CPTII,,, | Performed by: INTERNAL MEDICINE

## 2022-09-26 PROCEDURE — 1159F MED LIST DOCD IN RCRD: CPT | Mod: CPTII,,, | Performed by: INTERNAL MEDICINE

## 2022-09-26 PROCEDURE — 1101F PT FALLS ASSESS-DOCD LE1/YR: CPT | Mod: CPTII,,, | Performed by: INTERNAL MEDICINE

## 2022-09-26 PROCEDURE — 4010F PR ACE/ARB THEARPY RXD/TAKEN: ICD-10-PCS | Mod: CPTII,,, | Performed by: INTERNAL MEDICINE

## 2022-09-26 PROCEDURE — 1101F PR PT FALLS ASSESS DOC 0-1 FALLS W/OUT INJ PAST YR: ICD-10-PCS | Mod: CPTII,,, | Performed by: INTERNAL MEDICINE

## 2022-09-26 PROCEDURE — 3078F DIAST BP <80 MM HG: CPT | Mod: CPTII,,, | Performed by: INTERNAL MEDICINE

## 2022-09-26 RX ORDER — ERGOCALCIFEROL 1.25 MG/1
50000 CAPSULE ORAL
Qty: 12 CAPSULE | Refills: 3 | Status: SHIPPED | OUTPATIENT
Start: 2022-09-26 | End: 2024-03-25

## 2022-09-26 NOTE — PROGRESS NOTES
Patient ID: Varsha Rg is a 67 y.o. female.    Chief Complaint: Follow-up ( On labs)      HPI:   Patient presents here today for above reason.     Ms. Nazario screening is up-to-date here for follow-up is a 67-year-old female presents today in follow-up.  Have blood work done here for review.  Did gastric sleeve and is down 47 lb since January.  Had blood work done here for review rest of her age-appropriate    The patient's Health Maintenance was reviewed and the following appears to be due at this time:   Health Maintenance Due   Topic Date Due    Hepatitis C Screening  Never done    DEXA Scan  Never done    Colorectal Cancer Screening  08/04/2020    Mammogram  08/23/2022        Past Medical History:  Past Medical History:   Diagnosis Date    Benign paroxysmal vertigo, bilateral     Concussion     Fibromyalgia     Hypertension     Personal history of colonic polyps 08/04/2015    Skin cancer     SOB (shortness of breath)     Systemic lupus erythematosus, organ or system involvement unspecified     Unspecified cataract      Past Surgical History:   Procedure Laterality Date    APPENDECTOMY      CATARACT EXTRACTION Right 06/14/2022    CATARACT EXTRACTION Left 06/28/2022    CHOLECYSTECTOMY      COLONOSCOPY  08/04/2015    GASTRECTOMY      HYSTERECTOMY      KNEE SURGERY      WRIST FRACTURE SURGERY       Review of patient's allergies indicates:   Allergen Reactions    Azithromycin     Levaquin [levofloxacin]     Lortab [hydrocodone-acetaminophen]     Pcn [penicillins]      Current Outpatient Medications on File Prior to Visit   Medication Sig Dispense Refill    clonazePAM (KLONOPIN) 2 MG Tab TAKE TWO TABLETS BY MOUTH EVERY NIGHT AT BEDTIME 60 tablet 2    irbesartan (AVAPRO) 75 MG tablet       meclizine (ANTIVERT) 12.5 mg tablet Take 12.5 mg by mouth 3 (three) times daily as needed.      pantoprazole (PROTONIX) 40 MG tablet Take 40 mg by mouth once daily.      [DISCONTINUED] DEXILANT 60 mg capsule Take 1 capsule by  "mouth every morning.      [DISCONTINUED] ergocalciferol (ERGOCALCIFEROL) 50,000 unit Cap Take 50,000 Int'l Units by mouth.      [DISCONTINUED] furosemide (LASIX) 20 MG tablet       [DISCONTINUED] metronidazole 0.75% (METROCREAM) 0.75 % Crea APPLY TO THE AFFECTED AREA(S) TWICE DAILY      [DISCONTINUED] neomycin-polymyxin-dexamethasone (MAXITROL) 3.5mg/mL-10,000 unit/mL-0.1 % DrpS Place into the left eye.      [DISCONTINUED] PEPCID 40 mg tablet Take 40 mg by mouth nightly.      [DISCONTINUED] phenazopyridine (PYRIDIUM) 200 MG tablet Take 200 mg by mouth 3 (three) times daily as needed.       No current facility-administered medications on file prior to visit.     Social History     Socioeconomic History    Marital status:    Tobacco Use    Smoking status: Never    Smokeless tobacco: Never   Substance and Sexual Activity    Drug use: Never     Social Determinants of Health     Financial Resource Strain: Low Risk     Difficulty of Paying Living Expenses: Not hard at all   Food Insecurity: No Food Insecurity    Worried About Running Out of Food in the Last Year: Never true    Ran Out of Food in the Last Year: Never true   Transportation Needs: No Transportation Needs    Lack of Transportation (Medical): No    Lack of Transportation (Non-Medical): No   Stress: No Stress Concern Present    Feeling of Stress : Not at all   Housing Stability: Low Risk     Unable to Pay for Housing in the Last Year: No    Number of Places Lived in the Last Year: 1    Unstable Housing in the Last Year: No     History reviewed. No pertinent family history.    ROS:   Comprehensive review of systems was performed and is negative except as noted above    Vitals/PE:   /76 (BP Location: Left arm, Patient Position: Sitting)   Pulse 67   Resp 18   Ht 4' 11" (1.499 m)   Wt 81.2 kg (179 lb)   SpO2 96%   BMI 36.15 kg/m²   Physical Exam    General: Alert and oriented, No acute distress.   Eye: Normal conjunctiva without exudate.  HENMT: " Normocephalic/AT, Normal hearing, Oral mucosa is moist and pink   Neck: No goiter visualized.   Respiratory: Lungs CTAB, Respirations are non-labored, Breath sounds are equal, Symmetrical chest wall expansion.  Cardiovascular: Normal rate, Regular rhythm, No murmur, No edema.   Gastrointestinal: Non-distended.   Genitourinary: Deferred.  Musculoskeletal: Normal ROM, Normal gait, No deformities or amputations.  Integumentary: Warm, Dry, Intact. No diaphoresis, or flushing.  Neurologic: No focal deficits, Cranial Nerves II-XII are grossly intact.   Psychiatric: Cooperative, Appropriate mood & affect, Normal judgment, Non-suicidal.    Assessment/Plan:       1. Morbid obesity    2. Deep vein thrombosis (DVT) of proximal lower extremity, unspecified chronicity, unspecified laterality    3. Hypertension, unspecified type    4. Fibromyalgia    5. Systemic lupus erythematosus, unspecified SLE type, unspecified organ involvement status       Plan:  Cont wt loss, down 47# p gastric sleeve  B complex vits. Vit D, calcium  Cpmm  Labs wnl  BP at goal  Rtc  6mo    Education and counseling done face to face regarding medical conditions and plan. Contact office if new symptoms develop. Should any symptoms ever significantly worsen seek emergency medical attention/go to ER. Follow up at least yearly for wellness or sooner PRN. Nurse to call patient with any results. The patient is receptive, expresses understanding and is agreeable to plan. All questions have been answered.    No follow-ups on file.

## 2022-09-26 NOTE — LETTER
AUTHORIZATION FOR RELEASE OF   CONFIDENTIAL INFORMATION    Dear [facility],    We are seeing Varsha Rg, date of birth 1955, in the clinic at [place] [address] [facility]. JULISA LERMA MD is the patient's PCP. Varsha Rg has an outstanding lab/procedure at the time we reviewed her chart. In order to help keep her health information updated, she has authorized us to request the following medical record(s):        ( x )  MAMMOGRAM                                      (  )  COLONOSCOPY      (  )  PAP SMEAR                                          (  )  OUTSIDE LAB RESULTS     (  )  DEXA SCAN                                          (  )  EYE EXAM            (  )  FOOT EXAM                                          (  )  ENTIRE RECORD     (  )  OUTSIDE IMMUNIZATIONS                 (  )  _______________         Please fax records to [facility], BRADLEY CHASTANT, MD, [phone]     If you have any questions, please contact Manuel at [phone].           Patient Name: Varsha Rg  : 1955  Patient Phone #: [phone]

## 2022-09-26 NOTE — LETTER
AUTHORIZATION FOR RELEASE OF   CONFIDENTIAL INFORMATION    Dear Dr. Olivares,    We are seeing Varsha Rg, date of birth 1955, in the clinic at [address]. JULISA LERMA MD is the patient's PCP. Varsha Rg has an outstanding lab/procedure at the time we reviewed her chart. In order to help keep her health information updated, she has authorized us to request the following medical record(s):        (  )  MAMMOGRAM                                      ( x )  COLONOSCOPY      (  )  PAP SMEAR                                          (  )  OUTSIDE LAB RESULTS     (  )  DEXA SCAN                                          (  )  EYE EXAM            (  )  FOOT EXAM                                          (  )  ENTIRE RECORD     (  )  OUTSIDE IMMUNIZATIONS                 (  )  _______________         Please fax records to [facility], BRADLEY CHASTANT, MD, [phone]     If you have any questions, please contact Manuel at [phone].           Patient Name: Varsha Rg  : 1955  Patient Phone #: [phone]

## 2022-09-27 ENCOUNTER — DOCUMENTATION ONLY (OUTPATIENT)
Dept: INTERNAL MEDICINE | Facility: CLINIC | Age: 67
End: 2022-09-27
Payer: MEDICARE

## 2022-10-27 ENCOUNTER — CLINICAL SUPPORT (OUTPATIENT)
Dept: BARIATRICS | Facility: HOSPITAL | Age: 67
End: 2022-10-27

## 2022-10-27 VITALS — WEIGHT: 178 LBS | BODY MASS INDEX: 35.95 KG/M2

## 2022-10-27 NOTE — PROGRESS NOTES
A 9 month F/U was conducted with Katelyn via phone. She reports a verbal weight of 178 lbs. She states that she just had a procedure done and is not doing the elliptical, but walking. She admits she is drinking tea. She was advised to drink sugar-free not sugar and to drink more water. Patient will follow directions of her doctors regarding physical activity. ,     Skyla Ramirez, ISSAC, CPT, CHC

## 2022-10-27 NOTE — PROGRESS NOTES
Date:     POST OP BARIATRIC NUTRITION FOLLOW UP    Type of surgery: sleeve gastrectomy   Post-op visit:   [] 2 weeks  [] 4 weeks:  [] 2 months:  [] 4 months:  [] 6 months:  [x] 9 months:  [] 1 year:   [] Other:     Weight:  178lb    Post op complications:   [] Yes [x] No  If yes: [] Nausea   [] Vomiting   [] Constipation    [] Diarrhea    [] Other:     Dietary tolerance:  [x] Yes [] No [] Comment:     Adequate fluid intake:  [] Yes [x] No Approx. daily fluid intake: 40oz ---  encouraged her to increase   Adequate protein intake:  [x] Yes [] No  Approx. daily protein intake: 60g     Vitamins/Minerals:   [x] MVI:    [] Biotin:  [x] Calcium:    [] Hair/Nails:  [x] B 50 complex:   [] Bariatric Specific MVI:  [x] B12:    [] Bariatric Specific Calcium:  [] Iron:    [] Other:    pt is taking vitamins   [] Non-compliance:        Labs:  NA  Comment:    Expected compliance:  [x]Good  []Fair  []Poor      Progress:   [x]Patient progressing well at this time with no complaints   [] Patient expressed complaint at this time: See additional notes       Bariatric Diet Education:   Verbalizes understanding Demonstrates  Needs further teaching Needs practice/ supervision Comments    Bariatric Clear [] [] [] []    Bariatric Full [] [] [] []    Bariatric Puree [] [] [] []    Bariatric Soft [] [] [] []    Bariatric Regular [x] [] [] []    Bariatric Reintroduction of Starchy CHO [] [] [] []    Bariatric: Mindful Eating [] [] [] []    Importance of Protein and Vitamin Protocol [] [] [] []    Other:            Additional Notes:   Pt is doing well at this time with no complaints   1 egg and 1 slice bar   5 boiled shrimp with potato   2 steak

## 2023-01-30 ENCOUNTER — LAB VISIT (OUTPATIENT)
Dept: LAB | Facility: HOSPITAL | Age: 68
End: 2023-01-30
Attending: SURGERY
Payer: MEDICARE

## 2023-01-30 DIAGNOSIS — E53.8 B12 DEFICIENCY: ICD-10-CM

## 2023-01-30 DIAGNOSIS — D64.9 ANEMIA, UNSPECIFIED TYPE: Primary | ICD-10-CM

## 2023-01-30 DIAGNOSIS — E51.9 VITAMIN B1 DEFICIENCY: ICD-10-CM

## 2023-01-30 DIAGNOSIS — E51.11 BERIBERI: ICD-10-CM

## 2023-01-30 DIAGNOSIS — T45.2X6A UNDERDOSING OF VITAMIN: ICD-10-CM

## 2023-01-30 DIAGNOSIS — D51.1 MEGALOBLASTIC ANEMIA DUE TO VITAMIN B12 MALABSORPTION WITH PROTEINURIA: ICD-10-CM

## 2023-01-30 LAB
ALBUMIN SERPL-MCNC: 3.6 G/DL (ref 3.4–4.8)
ALBUMIN/GLOB SERPL: 0.8 RATIO (ref 1.1–2)
ALP SERPL-CCNC: 80 UNIT/L (ref 40–150)
ALT SERPL-CCNC: 22 UNIT/L (ref 0–55)
AST SERPL-CCNC: 25 UNIT/L (ref 5–34)
BILIRUBIN DIRECT+TOT PNL SERPL-MCNC: 0.4 MG/DL
BUN SERPL-MCNC: 16.7 MG/DL (ref 9.8–20.1)
CALCIUM SERPL-MCNC: 9.6 MG/DL (ref 8.4–10.2)
CHLORIDE SERPL-SCNC: 106 MMOL/L (ref 98–107)
CO2 SERPL-SCNC: 26 MMOL/L (ref 23–31)
CREAT SERPL-MCNC: 0.86 MG/DL (ref 0.55–1.02)
ERYTHROCYTE [DISTWIDTH] IN BLOOD BY AUTOMATED COUNT: 12.4 % (ref 11.5–17)
GFR SERPLBLD CREATININE-BSD FMLA CKD-EPI: >60 MLS/MIN/1.73/M2
GLOBULIN SER-MCNC: 4.6 GM/DL (ref 2.4–3.5)
GLUCOSE SERPL-MCNC: 98 MG/DL (ref 82–115)
HCT VFR BLD AUTO: 44.1 % (ref 37–47)
HGB BLD-MCNC: 13.9 GM/DL (ref 12–16)
IRON SATN MFR SERPL: 30 % (ref 20–50)
IRON SERPL-MCNC: 65 UG/DL (ref 50–170)
MCH RBC QN AUTO: 29 PG
MCHC RBC AUTO-ENTMCNC: 31.5 MG/DL (ref 33–36)
MCV RBC AUTO: 92.1 FL (ref 80–94)
NRBC BLD AUTO-RTO: 0 %
PLATELET # BLD AUTO: 259 X10(3)/MCL (ref 130–400)
PMV BLD AUTO: 11.3 FL (ref 7.4–10.4)
POTASSIUM SERPL-SCNC: 4.3 MMOL/L (ref 3.5–5.1)
PROT SERPL-MCNC: 8.2 GM/DL (ref 5.8–7.6)
RBC # BLD AUTO: 4.79 X10(6)/MCL (ref 4.2–5.4)
SODIUM SERPL-SCNC: 141 MMOL/L (ref 136–145)
TIBC SERPL-MCNC: 155 UG/DL (ref 70–310)
TIBC SERPL-MCNC: 220 UG/DL (ref 250–450)
VIT B12 SERPL-MCNC: >2000 PG/ML (ref 213–816)
WBC # SPEC AUTO: 12.5 X10(3)/MCL (ref 4.5–11.5)

## 2023-01-30 PROCEDURE — 82607 VITAMIN B-12: CPT

## 2023-01-30 PROCEDURE — 80053 COMPREHEN METABOLIC PANEL: CPT

## 2023-01-30 PROCEDURE — 83550 IRON BINDING TEST: CPT

## 2023-01-30 PROCEDURE — 85027 COMPLETE CBC AUTOMATED: CPT

## 2023-01-30 PROCEDURE — 36415 COLL VENOUS BLD VENIPUNCTURE: CPT

## 2023-01-30 PROCEDURE — 84425 ASSAY OF VITAMIN B-1: CPT | Mod: GA

## 2023-02-08 ENCOUNTER — CLINICAL SUPPORT (OUTPATIENT)
Dept: BARIATRICS | Facility: HOSPITAL | Age: 68
End: 2023-02-08

## 2023-02-08 VITALS — BODY MASS INDEX: 35.51 KG/M2 | WEIGHT: 175.81 LBS

## 2023-02-08 NOTE — PROGRESS NOTES
POST OP BARIATRIC NUTRITION FOLLOW UP    Type of surgery: sleeve gastrectomy   Post-op visit:   [] 2 weeks  [] 4 weeks:  [] 2 months:  [] 4 months:  [] 6 months:  [] 9 months:  [x] 1 year:   [] Other:     Weight:  175lb    Post op complications:   [] Yes [x] No  If yes: [] Nausea   [] Vomiting   [] Constipation    [] Diarrhea    [] Other:     Dietary tolerance:  [x] Yes [] No [] Comment:     Adequate fluid intake:  [] Yes [x] No Approx. daily fluid intake: 3 bottles    Adequate protein intake:  [] Yes [x] No  Approx. daily protein intake: 40-50g     Vitamins/Minerals:   [x] MVI:    [] Biotin:  [x] Calcium:    [x] Hair/Nails:  [x] B 50 complex:   [] Bariatric Specific MVI:  [x] B12:    [] Bariatric Specific Calcium:  [] Iron:    [] Other:   [] Non-compliance:        Labs:   WNL --- b vitamins   Comment:    Expected compliance:  [x]Good  []Fair  []Poor      Progress:   [x]Patient progressing well at this time with no complaints   [] Patient expressed complaint at this time: See additional notes       Bariatric Diet Education:   Verbalizes understanding Demonstrates  Needs further teaching Needs practice/ supervision Comments    Bariatric Clear [] [] [] []    Bariatric Full [] [] [] []    Bariatric Puree [] [] [] []    Bariatric Soft [] [] [] []    Bariatric Regular [] [] [] []    Bariatric Reintroduction of Starchy CHO [] [] [] []    Bariatric: Mindful Eating [] [] [] []    Importance of Protein and Vitamin Protocol [] [] [] []    Other:            Additional Notes:     Pt is doing well at this time, wants to lose more weight, she will increase water, increase protein at lunch and will add  1 snack in. Pt will call us in the next couple months if she needs anything   B: eggs and bar   S: protein smoothies   D: 3 shrimp   A cookie

## 2023-02-08 NOTE — PROGRESS NOTES
A 1 year visit was conducted with Katelyn in the office.  A body composition was conducted and patient lost 40 lbs. And 23 inches since preop body composition was conducted. Patient was educated on her results. She was given a handout on mindful eating.       ISSAC [place], CPT, CHC

## 2023-02-14 LAB — VIT B1 BLD-SCNC: 140 NMOL/L (ref 70–180)

## 2023-03-20 ENCOUNTER — TELEPHONE (OUTPATIENT)
Dept: INTERNAL MEDICINE | Facility: CLINIC | Age: 68
End: 2023-03-20
Payer: MEDICARE

## 2023-03-20 NOTE — TELEPHONE ENCOUNTER
----- Message from Manuel Barr LPN sent at 3/20/2023  1:23 PM CDT -----  Regarding: kwame tom 3/27 @3  Are there any outstanding tasks in patient chart? no    Is there documentation of outstanding tasks in patient chart? no    Has patient been to the ER, urgent care, or another physician since last visit?    Has patient done any blood work or x-rays since last visit?

## 2023-03-27 ENCOUNTER — OFFICE VISIT (OUTPATIENT)
Dept: INTERNAL MEDICINE | Facility: CLINIC | Age: 68
End: 2023-03-27
Payer: MEDICARE

## 2023-03-27 VITALS
BODY MASS INDEX: 35.68 KG/M2 | OXYGEN SATURATION: 98 % | HEIGHT: 59 IN | SYSTOLIC BLOOD PRESSURE: 112 MMHG | HEART RATE: 57 BPM | WEIGHT: 177 LBS | RESPIRATION RATE: 18 BRPM | DIASTOLIC BLOOD PRESSURE: 78 MMHG

## 2023-03-27 DIAGNOSIS — I82.4Y9 DEEP VEIN THROMBOSIS (DVT) OF PROXIMAL LOWER EXTREMITY, UNSPECIFIED CHRONICITY, UNSPECIFIED LATERALITY: ICD-10-CM

## 2023-03-27 DIAGNOSIS — E66.01 MORBID OBESITY: ICD-10-CM

## 2023-03-27 DIAGNOSIS — Z78.0 POSTMENOPAUSAL: Primary | ICD-10-CM

## 2023-03-27 DIAGNOSIS — Z09 FOLLOW-UP EXAM: ICD-10-CM

## 2023-03-27 DIAGNOSIS — M32.9 SYSTEMIC LUPUS ERYTHEMATOSUS, UNSPECIFIED SLE TYPE, UNSPECIFIED ORGAN INVOLVEMENT STATUS: ICD-10-CM

## 2023-03-27 PROCEDURE — 1101F PT FALLS ASSESS-DOCD LE1/YR: CPT | Mod: CPTII,,, | Performed by: INTERNAL MEDICINE

## 2023-03-27 PROCEDURE — 1160F PR REVIEW ALL MEDS BY PRESCRIBER/CLIN PHARMACIST DOCUMENTED: ICD-10-PCS | Mod: CPTII,,, | Performed by: INTERNAL MEDICINE

## 2023-03-27 PROCEDURE — 3288F FALL RISK ASSESSMENT DOCD: CPT | Mod: CPTII,,, | Performed by: INTERNAL MEDICINE

## 2023-03-27 PROCEDURE — 3074F SYST BP LT 130 MM HG: CPT | Mod: CPTII,,, | Performed by: INTERNAL MEDICINE

## 2023-03-27 PROCEDURE — 1101F PR PT FALLS ASSESS DOC 0-1 FALLS W/OUT INJ PAST YR: ICD-10-PCS | Mod: CPTII,,, | Performed by: INTERNAL MEDICINE

## 2023-03-27 PROCEDURE — 1159F MED LIST DOCD IN RCRD: CPT | Mod: CPTII,,, | Performed by: INTERNAL MEDICINE

## 2023-03-27 PROCEDURE — 3074F PR MOST RECENT SYSTOLIC BLOOD PRESSURE < 130 MM HG: ICD-10-PCS | Mod: CPTII,,, | Performed by: INTERNAL MEDICINE

## 2023-03-27 PROCEDURE — 4010F PR ACE/ARB THEARPY RXD/TAKEN: ICD-10-PCS | Mod: CPTII,,, | Performed by: INTERNAL MEDICINE

## 2023-03-27 PROCEDURE — 3078F DIAST BP <80 MM HG: CPT | Mod: CPTII,,, | Performed by: INTERNAL MEDICINE

## 2023-03-27 PROCEDURE — 3078F PR MOST RECENT DIASTOLIC BLOOD PRESSURE < 80 MM HG: ICD-10-PCS | Mod: CPTII,,, | Performed by: INTERNAL MEDICINE

## 2023-03-27 PROCEDURE — 1160F RVW MEDS BY RX/DR IN RCRD: CPT | Mod: CPTII,,, | Performed by: INTERNAL MEDICINE

## 2023-03-27 PROCEDURE — 1159F PR MEDICATION LIST DOCUMENTED IN MEDICAL RECORD: ICD-10-PCS | Mod: CPTII,,, | Performed by: INTERNAL MEDICINE

## 2023-03-27 PROCEDURE — 4010F ACE/ARB THERAPY RXD/TAKEN: CPT | Mod: CPTII,,, | Performed by: INTERNAL MEDICINE

## 2023-03-27 PROCEDURE — 99214 PR OFFICE/OUTPT VISIT, EST, LEVL IV, 30-39 MIN: ICD-10-PCS | Mod: ,,, | Performed by: INTERNAL MEDICINE

## 2023-03-27 PROCEDURE — 3008F BODY MASS INDEX DOCD: CPT | Mod: CPTII,,, | Performed by: INTERNAL MEDICINE

## 2023-03-27 PROCEDURE — 3008F PR BODY MASS INDEX (BMI) DOCUMENTED: ICD-10-PCS | Mod: CPTII,,, | Performed by: INTERNAL MEDICINE

## 2023-03-27 PROCEDURE — 99214 OFFICE O/P EST MOD 30 MIN: CPT | Mod: ,,, | Performed by: INTERNAL MEDICINE

## 2023-03-27 PROCEDURE — 3288F PR FALLS RISK ASSESSMENT DOCUMENTED: ICD-10-PCS | Mod: CPTII,,, | Performed by: INTERNAL MEDICINE

## 2023-03-27 NOTE — PROGRESS NOTES
Patient ID: Varsha Rg is a 68 y.o. female.    Chief Complaint: Follow-up (6 month f/u, labs 1/30)      HPI:   Patient presents here today for above reason.     Ms. Nazario screening is up-to-date here for follow-up is a 67-year-old female presents today in follow-up.  Have blood work done here for review.  Did gastric sleeve and is down 47 lb since January.  Had blood work done here for review rest of her age-appropriate    The patient's Health Maintenance was reviewed and the following appears to be due at this time:   Health Maintenance Due   Topic Date Due    Hepatitis C Screening  Never done    DEXA Scan  Never done    Hemoglobin A1c (Diabetic Prevention Screening)  05/01/2020    Colorectal Cancer Screening  08/04/2020    Mammogram  08/23/2022        Past Medical History:  Past Medical History:   Diagnosis Date    Benign paroxysmal vertigo, bilateral     Concussion     Fibromyalgia     Hypertension     Personal history of colonic polyps 08/04/2015    Skin cancer     SOB (shortness of breath)     Systemic lupus erythematosus, organ or system involvement unspecified     Unspecified cataract      Past Surgical History:   Procedure Laterality Date    APPENDECTOMY      CATARACT EXTRACTION Right 06/14/2022    CATARACT EXTRACTION Left 06/28/2022    CHOLECYSTECTOMY      COLONOSCOPY  08/04/2015    GASTRECTOMY      HYSTERECTOMY      KNEE SURGERY      WRIST FRACTURE SURGERY       Review of patient's allergies indicates:   Allergen Reactions    Azithromycin     Levaquin [levofloxacin]     Lortab [hydrocodone-acetaminophen]     Pcn [penicillins]      Current Outpatient Medications on File Prior to Visit   Medication Sig Dispense Refill    clonazePAM (KLONOPIN) 2 MG Tab TAKE TWO TABLETS BY MOUTH EVERY NIGHT AT BEDTIME 60 tablet 2    ergocalciferol (ERGOCALCIFEROL) 50,000 unit Cap Take 1 capsule (50,000 Units total) by mouth every 7 days. 12 capsule 3    irbesartan (AVAPRO) 75 MG tablet       meclizine (ANTIVERT) 12.5 mg  "tablet Take 12.5 mg by mouth 3 (three) times daily as needed.      pantoprazole (PROTONIX) 40 MG tablet Take 40 mg by mouth once daily.       No current facility-administered medications on file prior to visit.     Social History     Socioeconomic History    Marital status:    Tobacco Use    Smoking status: Never    Smokeless tobacco: Never   Substance and Sexual Activity    Drug use: Never     Social Determinants of Health     Financial Resource Strain: Low Risk     Difficulty of Paying Living Expenses: Not hard at all   Food Insecurity: No Food Insecurity    Worried About Running Out of Food in the Last Year: Never true    Ran Out of Food in the Last Year: Never true   Transportation Needs: No Transportation Needs    Lack of Transportation (Medical): No    Lack of Transportation (Non-Medical): No   Stress: No Stress Concern Present    Feeling of Stress : Not at all   Housing Stability: Low Risk     Unable to Pay for Housing in the Last Year: No    Number of Places Lived in the Last Year: 1    Unstable Housing in the Last Year: No     History reviewed. No pertinent family history.    ROS:   Comprehensive review of systems was performed and is negative except as noted above    Vitals/PE:   /78 (BP Location: Left arm, Patient Position: Sitting)   Pulse (!) 57   Resp 18   Ht 4' 11" (1.499 m)   Wt 80.3 kg (177 lb)   SpO2 98%   BMI 35.75 kg/m²   Physical Exam    General: Alert and oriented, No acute distress.   Eye: Normal conjunctiva without exudate.  HENMT: Normocephalic/AT, Normal hearing, Oral mucosa is moist and pink   Neck: No goiter visualized.   Respiratory: Lungs CTAB, Respirations are non-labored, Breath sounds are equal, Symmetrical chest wall expansion.  Cardiovascular: Normal rate, Regular rhythm, No murmur, No edema.   Gastrointestinal: Non-distended.   Genitourinary: Deferred.  Musculoskeletal: Normal ROM, Normal gait, No deformities or amputations.  Integumentary: Warm, Dry, Intact. No " diaphoresis, or flushing.  Neurologic: No focal deficits, Cranial Nerves II-XII are grossly intact.   Psychiatric: Cooperative, Appropriate mood & affect, Normal judgment, Non-suicidal.    Assessment/Plan:       1. Postmenopausal  -     DXA Bone Density Axial Skeleton 1 or more sites; Future; Expected date: 03/27/2023  mmg  2. Follow-up exam  Labs stable, off b12  Cpmm  Screening up to date  3. Systemic lupus erythematosus, unspecified SLE type, unspecified organ involvement status   Stable, not an issue at the moment  4. Morbid obesity  S/p sleeve  5. Deep vein thrombosis (DVT) of proximal lower extremity, unspecified chronicity, unspecified laterality  resolved           Education and counseling done face to face regarding medical conditions and plan. Contact office if new symptoms develop. Should any symptoms ever significantly worsen seek emergency medical attention/go to ER. Follow up at least yearly for wellness or sooner PRN. Nurse to call patient with any results. The patient is receptive, expresses understanding and is agreeable to plan. All questions have been answered.    No follow-ups on file.

## 2023-04-05 ENCOUNTER — HOSPITAL ENCOUNTER (OUTPATIENT)
Dept: RADIOLOGY | Facility: HOSPITAL | Age: 68
Discharge: HOME | End: 2023-04-05
Attending: INTERNAL MEDICINE
Payer: MEDICARE

## 2023-04-05 ENCOUNTER — HOSPITAL ENCOUNTER (OUTPATIENT)
Dept: RADIOLOGY | Facility: HOSPITAL | Age: 68
Discharge: HOME | End: 2023-04-05
Attending: OBSTETRICS & GYNECOLOGY
Payer: MEDICARE

## 2023-04-05 DIAGNOSIS — Z78.0 POSTMENOPAUSAL: ICD-10-CM

## 2023-04-05 DIAGNOSIS — Z12.31 SCREENING MAMMOGRAM, ENCOUNTER FOR: ICD-10-CM

## 2023-04-05 PROCEDURE — 77063 BREAST TOMOSYNTHESIS BI: CPT | Mod: 26,,, | Performed by: RADIOLOGY

## 2023-04-05 PROCEDURE — 77063 MAMMO DIGITAL SCREENING BILAT WITH TOMO: ICD-10-PCS | Mod: 26,,, | Performed by: RADIOLOGY

## 2023-04-05 PROCEDURE — 77067 SCR MAMMO BI INCL CAD: CPT | Mod: TC

## 2023-04-05 PROCEDURE — 77081 DEXA BONE DENSITY APPENDICULAR SKELETON: ICD-10-PCS | Mod: 26,,, | Performed by: RADIOLOGY

## 2023-04-05 PROCEDURE — 77081 DXA BONE DENSITY APPENDICULR: CPT | Mod: TC

## 2023-04-05 PROCEDURE — 77080 DXA BONE DENSITY AXIAL: CPT | Mod: XU,TC

## 2023-04-05 PROCEDURE — 77081 DXA BONE DENSITY APPENDICULR: CPT | Mod: 26,,, | Performed by: RADIOLOGY

## 2023-04-05 PROCEDURE — 77067 SCR MAMMO BI INCL CAD: CPT | Mod: 26,,, | Performed by: RADIOLOGY

## 2023-04-05 PROCEDURE — 77080 DXA BONE DENSITY AXIAL SKELETON 1 OR MORE SITES: ICD-10-PCS | Mod: 26,XU,, | Performed by: RADIOLOGY

## 2023-04-05 PROCEDURE — 77080 DXA BONE DENSITY AXIAL: CPT | Mod: 26,XU,, | Performed by: RADIOLOGY

## 2023-04-05 PROCEDURE — 77067 MAMMO DIGITAL SCREENING BILAT WITH TOMO: ICD-10-PCS | Mod: 26,,, | Performed by: RADIOLOGY

## 2023-05-23 DIAGNOSIS — K21.9 GASTROESOPHAGEAL REFLUX DISEASE, UNSPECIFIED WHETHER ESOPHAGITIS PRESENT: Primary | ICD-10-CM

## 2023-05-23 RX ORDER — PANTOPRAZOLE SODIUM 40 MG/1
TABLET, DELAYED RELEASE ORAL
Qty: 30 TABLET | Refills: 6 | Status: SHIPPED | OUTPATIENT
Start: 2023-05-23

## 2023-07-05 RX ORDER — CLONAZEPAM 2 MG/1
TABLET ORAL
Qty: 60 TABLET | Refills: 3 | Status: SHIPPED | OUTPATIENT
Start: 2023-07-05 | End: 2023-11-07

## 2023-07-13 ENCOUNTER — TELEPHONE (OUTPATIENT)
Dept: INTERNAL MEDICINE | Facility: CLINIC | Age: 68
End: 2023-07-13
Payer: MEDICARE

## 2023-07-13 NOTE — TELEPHONE ENCOUNTER
----- Message from Landry Solano sent at 7/13/2023 11:25 AM CDT -----  .Type:  Needs Medical Advice    Who Called: [facility]  Would the patient rather a call back or a response via [facility]? Call back  Best Call Back Number: [phone]  Additional Information: calling to see about getting most recent labs faxed: [phone]

## 2023-07-14 ENCOUNTER — TELEPHONE (OUTPATIENT)
Dept: INTERNAL MEDICINE | Facility: CLINIC | Age: 68
End: 2023-07-14
Payer: MEDICARE

## 2023-07-14 NOTE — TELEPHONE ENCOUNTER
----- Message from Ashley Graham sent at 7/14/2023  8:46 AM CDT -----  .Type:  Needs Medical Advice    Who Called: [facility]  Symptoms (please be specific):    How long has patient had these symptoms:    Pharmacy name and phone #:    Would the patient rather a call back or a response via [facility]?   Best Call Back Number: fax [phone]  Additional Information: please fax recent labs to [facility] pt has appt. On Monday

## 2023-08-02 ENCOUNTER — TELEPHONE (OUTPATIENT)
Dept: INTERNAL MEDICINE | Facility: CLINIC | Age: 68
End: 2023-08-02
Payer: MEDICARE

## 2023-08-02 NOTE — TELEPHONE ENCOUNTER
----- Message from Omar Mosher sent at 8/2/2023  9:33 AM CDT -----  .Type:  Needs Medical Advice    Who Called: [facility]  Symptoms (please be specific):    How long has patient had these symptoms:    Pharmacy name and phone #:    Would the patient rather a call back or a response via [facility]?   Best Call Back Number: [phone]  Additional Information: Requested to speak with Ms. Hyman re: phone disconnected before I cold ask what she needed.

## 2023-08-02 NOTE — TELEPHONE ENCOUNTER
Patient is requesting Amoxicillin for ear pain.  She indicated she has been experiencing (R) ear pain x 4 weeks.  She indicated she was put on 2 rounds of Ax, drops and cream but has been requesting Amoxicillin because this is the only medication that helps clear up any of her symptoms.   Please Advise

## 2023-08-15 ENCOUNTER — PATIENT MESSAGE (OUTPATIENT)
Dept: ADMINISTRATIVE | Facility: HOSPITAL | Age: 68
End: 2023-08-15
Payer: MEDICARE

## 2023-08-16 ENCOUNTER — PATIENT OUTREACH (OUTPATIENT)
Dept: ADMINISTRATIVE | Facility: HOSPITAL | Age: 68
End: 2023-08-16
Payer: MEDICARE

## 2023-08-16 DIAGNOSIS — Z12.11 SCREENING FOR COLON CANCER: Primary | ICD-10-CM

## 2023-08-25 ENCOUNTER — TELEPHONE (OUTPATIENT)
Dept: INTERNAL MEDICINE | Facility: CLINIC | Age: 68
End: 2023-08-25
Payer: MEDICARE

## 2023-08-25 DIAGNOSIS — R05.9 COUGH, UNSPECIFIED TYPE: Primary | ICD-10-CM

## 2023-08-25 RX ORDER — BENZONATATE 100 MG/1
100 CAPSULE ORAL 3 TIMES DAILY PRN
Qty: 30 CAPSULE | Refills: 0 | Status: SHIPPED | OUTPATIENT
Start: 2023-08-25 | End: 2023-09-04

## 2023-08-25 NOTE — TELEPHONE ENCOUNTER
Pt tested negative for Covid. Her cough is productive. She is requesting a script for Daron Berry. She doesn't want any antibiotics because she just completed antibiotics for hearing loss. Please advise

## 2023-08-25 NOTE — TELEPHONE ENCOUNTER
----- Message from Delisa Burnett sent at 8/25/2023  7:48 AM CDT -----  Regarding: advice  Type:  Needs Medical Advice    Who Called: pt  Symptoms (please be specific): congested cough   How long has patient had these symptoms:  about 5 days  Pharmacy name and phone #:  [facility] [address]  Would the patient rather a call back or a response via [facility]? C/b  Best Call Back Number: [phone]    Additional Information: pt is hoping for cough medicine from [facility]

## 2023-08-28 ENCOUNTER — HOSPITAL ENCOUNTER (OUTPATIENT)
Dept: RADIOLOGY | Facility: HOSPITAL | Age: 68
Discharge: HOME | End: 2023-08-28
Attending: NURSE PRACTITIONER
Payer: MEDICARE

## 2023-08-28 ENCOUNTER — OFFICE VISIT (OUTPATIENT)
Dept: INTERNAL MEDICINE | Facility: CLINIC | Age: 68
End: 2023-08-28
Payer: MEDICARE

## 2023-08-28 VITALS
BODY MASS INDEX: 36.08 KG/M2 | DIASTOLIC BLOOD PRESSURE: 76 MMHG | HEART RATE: 70 BPM | TEMPERATURE: 99 F | WEIGHT: 179 LBS | HEIGHT: 59 IN | OXYGEN SATURATION: 96 % | SYSTOLIC BLOOD PRESSURE: 124 MMHG

## 2023-08-28 DIAGNOSIS — J45.991 COUGH VARIANT ASTHMA: ICD-10-CM

## 2023-08-28 DIAGNOSIS — R61 NIGHT SWEATS: ICD-10-CM

## 2023-08-28 DIAGNOSIS — R05.1 ACUTE COUGH: ICD-10-CM

## 2023-08-28 DIAGNOSIS — R05.1 ACUTE COUGH: Primary | ICD-10-CM

## 2023-08-28 LAB
B PERT.PT PRMT NPH QL NAA+NON-PROBE: NOT DETECTED
C PNEUM DNA NPH QL NAA+NON-PROBE: NOT DETECTED
HADV DNA NPH QL NAA+NON-PROBE: NOT DETECTED
HCOV 229E RNA NPH QL NAA+NON-PROBE: NOT DETECTED
HCOV HKU1 RNA NPH QL NAA+NON-PROBE: NOT DETECTED
HCOV NL63 RNA NPH QL NAA+NON-PROBE: NOT DETECTED
HCOV OC43 RNA NPH QL NAA+NON-PROBE: NOT DETECTED
HMPV RNA NPH QL NAA+NON-PROBE: NOT DETECTED
HPIV1 RNA NPH QL NAA+NON-PROBE: NOT DETECTED
HPIV2 RNA NPH QL NAA+NON-PROBE: NOT DETECTED
HPIV3 RNA NPH QL NAA+NON-PROBE: NOT DETECTED
HPIV4 RNA NPH QL NAA+NON-PROBE: NOT DETECTED
M PNEUMO DNA NPH QL NAA+NON-PROBE: NOT DETECTED
RSV RNA NPH QL NAA+NON-PROBE: NOT DETECTED
RV+EV RNA NPH QL NAA+NON-PROBE: NOT DETECTED

## 2023-08-28 PROCEDURE — 99214 OFFICE O/P EST MOD 30 MIN: CPT | Mod: ,,, | Performed by: NURSE PRACTITIONER

## 2023-08-28 PROCEDURE — 3078F DIAST BP <80 MM HG: CPT | Mod: CPTII,,, | Performed by: NURSE PRACTITIONER

## 2023-08-28 PROCEDURE — 4010F ACE/ARB THERAPY RXD/TAKEN: CPT | Mod: CPTII,,, | Performed by: NURSE PRACTITIONER

## 2023-08-28 PROCEDURE — 1160F PR REVIEW ALL MEDS BY PRESCRIBER/CLIN PHARMACIST DOCUMENTED: ICD-10-PCS | Mod: CPTII,,, | Performed by: NURSE PRACTITIONER

## 2023-08-28 PROCEDURE — 3078F PR MOST RECENT DIASTOLIC BLOOD PRESSURE < 80 MM HG: ICD-10-PCS | Mod: CPTII,,, | Performed by: NURSE PRACTITIONER

## 2023-08-28 PROCEDURE — 3074F SYST BP LT 130 MM HG: CPT | Mod: CPTII,,, | Performed by: NURSE PRACTITIONER

## 2023-08-28 PROCEDURE — 3008F PR BODY MASS INDEX (BMI) DOCUMENTED: ICD-10-PCS | Mod: CPTII,,, | Performed by: NURSE PRACTITIONER

## 2023-08-28 PROCEDURE — 71046 X-RAY EXAM CHEST 2 VIEWS: CPT | Mod: TC

## 2023-08-28 PROCEDURE — 3008F BODY MASS INDEX DOCD: CPT | Mod: CPTII,,, | Performed by: NURSE PRACTITIONER

## 2023-08-28 PROCEDURE — 1160F RVW MEDS BY RX/DR IN RCRD: CPT | Mod: CPTII,,, | Performed by: NURSE PRACTITIONER

## 2023-08-28 PROCEDURE — 1159F MED LIST DOCD IN RCRD: CPT | Mod: CPTII,,, | Performed by: NURSE PRACTITIONER

## 2023-08-28 PROCEDURE — 4010F PR ACE/ARB THEARPY RXD/TAKEN: ICD-10-PCS | Mod: CPTII,,, | Performed by: NURSE PRACTITIONER

## 2023-08-28 PROCEDURE — 99214 PR OFFICE/OUTPT VISIT, EST, LEVL IV, 30-39 MIN: ICD-10-PCS | Mod: ,,, | Performed by: NURSE PRACTITIONER

## 2023-08-28 PROCEDURE — 3074F PR MOST RECENT SYSTOLIC BLOOD PRESSURE < 130 MM HG: ICD-10-PCS | Mod: CPTII,,, | Performed by: NURSE PRACTITIONER

## 2023-08-28 PROCEDURE — 1159F PR MEDICATION LIST DOCUMENTED IN MEDICAL RECORD: ICD-10-PCS | Mod: CPTII,,, | Performed by: NURSE PRACTITIONER

## 2023-08-28 RX ORDER — ALBUTEROL SULFATE 90 UG/1
2 AEROSOL, METERED RESPIRATORY (INHALATION) EVERY 6 HOURS PRN
Qty: 18 G | Refills: 1 | Status: SHIPPED | OUTPATIENT
Start: 2023-08-28 | End: 2023-10-26

## 2023-08-28 RX ORDER — FUROSEMIDE 20 MG/1
20 TABLET ORAL DAILY PRN
COMMUNITY
Start: 2023-04-17

## 2023-08-28 NOTE — PROGRESS NOTES
"Subjective:      Patient ID: Varsha Rg is a 68 y.o. female.    Chief Complaint: Cough (Chest congestion for over 1 week)      HPI: Patient of Dr. Vogt's here today for c/o cough/congestion. She had a negative covid test. She called on 8/25 for this same issue requesting Tessalon perles.   Onset was a week ago. Duration is days. Character is a constant cough especially worse when lying down. Aggravating factors include night time, lying flat. Relieving factors include tessalon perles, Delsym at night. Timing is daily and nightly. Severity is severe. This am around 0400 she woke up soaking wet with wet sheets. She reports a productive cough that was dark brown and almost black. Denies blood in sputum. Prior to that her sputum was greenish.     She recently finished a course of abtx r/t an ear infection. She reports losing her hearing to the right ear for 6 weeks. She had a fungal infection x2 to the right ear. Her hearing is better since then. She is taking flonase. She recalls taking Cefdinir along with a steroid but cannot recall the other two antibiotics. Possibly augmentin. She does not want another antibiotic.       Review of patient's allergies indicates:   Allergen Reactions    Azithromycin     Levaquin [levofloxacin]     Lortab [hydrocodone-acetaminophen]     Pcn [penicillins]        Review of Systems   Constitutional:  Positive for diaphoresis and fatigue.   HENT:  Positive for sore throat.    Respiratory:  Positive for cough.    Cardiovascular:  Positive for chest pain (r/t coughing). Negative for palpitations.       Objective:   Visit Vitals  /76   Pulse 70   Temp 99.4 °F (37.4 °C) (Temporal)   Ht 4' 11" (1.499 m)   Wt 81.2 kg (179 lb)   SpO2 96%   BMI 36.15 kg/m²     The patient's weight trend is below:   Wt Readings from Last 4 Encounters:   08/28/23 81.2 kg (179 lb)   03/27/23 80.3 kg (177 lb)   02/08/23 79.7 kg (175 lb 12.8 oz)   10/27/22 80.7 kg (178 lb)        Physical " Exam  Constitutional:       Appearance: Normal appearance.   HENT:      Head: Normocephalic and atraumatic.      Right Ear: Hearing normal. There is impacted cerumen.      Left Ear: Hearing normal. No swelling or tenderness. A foreign body (?) is present.      Ears:      Comments: L ear canal purulent d/c vs FB     Mouth/Throat:      Mouth: Mucous membranes are moist.   Eyes:      Extraocular Movements: Extraocular movements intact.      Pupils: Pupils are equal, round, and reactive to light.   Cardiovascular:      Rate and Rhythm: Normal rate and regular rhythm.      Pulses: Normal pulses.      Heart sounds: Normal heart sounds.   Pulmonary:      Effort: Pulmonary effort is normal. No respiratory distress.      Breath sounds: Normal breath sounds. No stridor. No wheezing, rhonchi or rales.   Musculoskeletal:      Cervical back: Normal range of motion and neck supple. No rigidity or tenderness.   Lymphadenopathy:      Cervical: No cervical adenopathy.   Skin:     General: Skin is warm.   Neurological:      Mental Status: She is alert and oriented to person, place, and time.         Assessment/Plan:   1. Acute cough  Rec Mucinex DM  Ok to continue Tessalon as needed  Inc fluids  RX albuterol  CXR as requested  Respiratory panel r/o other viral etiology given multiple antibiotics over the last 6 weeks    - X-Ray Chest PA And Lateral; Future  - albuterol (PROVENTIL/VENTOLIN HFA) 90 mcg/actuation inhaler; Inhale 2 puffs into the lungs every 6 (six) hours as needed for Wheezing.  Dispense: 18 g; Refill: 1  - Respiratory Panel    2. Night sweats  See above  Continue to monitor for fever    - X-Ray Chest PA And Lateral; Future  - Respiratory Panel    3. Cough variant asthma  Albuterol as needed    - Respiratory Panel        I have reviewed the notes, assessments, and/or procedures performed by Neil Nix, NP student, I concur with her/his documentation of Varsha Rg.    Medication List with Changes/Refills   New  Medications    ALBUTEROL (PROVENTIL/VENTOLIN HFA) 90 MCG/ACTUATION INHALER    Inhale 2 puffs into the lungs every 6 (six) hours as needed for Wheezing.   Current Medications    BENZONATATE (TESSALON) 100 MG CAPSULE    Take 1 capsule (100 mg total) by mouth 3 (three) times daily as needed for Cough.    CLONAZEPAM (KLONOPIN) 2 MG TAB    TAKE TWO TABLETS BY MOUTH EVERY NIGHT AT BEDTIME    ERGOCALCIFEROL (ERGOCALCIFEROL) 50,000 UNIT CAP    Take 1 capsule (50,000 Units total) by mouth every 7 days.    FUROSEMIDE (LASIX) 20 MG TABLET    Take 20 mg by mouth daily as needed.    IRBESARTAN (AVAPRO) 75 MG TABLET        MECLIZINE (ANTIVERT) 12.5 MG TABLET    Take 12.5 mg by mouth 3 (three) times daily as needed.    PANTOPRAZOLE (PROTONIX) 40 MG TABLET    TAKE ONE TABLET BY MOUTH DAILY        No follow-ups on file.    Chemistry:  Lab Results   Component Value Date     01/30/2023    K 4.3 01/30/2023    CHLORIDE 106 01/30/2023    BUN 16.7 01/30/2023    CREATININE 0.86 01/30/2023    EGFRNORACEVR >60 01/30/2023    GLUCOSE 98 01/30/2023    CALCIUM 9.6 01/30/2023    ALKPHOS 80 01/30/2023    LABPROT 8.2 (H) 01/30/2023    ALBUMIN 3.6 01/30/2023    BILIDIR 0.2 01/10/2022    IBILI 0.30 01/10/2022    AST 25 01/30/2023    ALT 22 01/30/2023        Lab Results   Component Value Date    HGBA1C 4.6 05/01/2017        Hematology:  Lab Results   Component Value Date    WBC 12.5 (H) 01/30/2023    HGB 13.9 01/30/2023    HCT 44.1 01/30/2023     01/30/2023       Lipid Panel:  Lab Results   Component Value Date    CHOL 194 07/13/2021    HDL 40 07/13/2021    LDL 99.00 07/13/2021    TRIG 275 (H) 07/13/2021    TOTALCHOLEST 5 07/13/2021        Urine:  Lab Results   Component Value Date    COLORUA Dark-Orange (A) 05/03/2022    APPEARANCEUA Cloudy (A) 05/03/2022    SGUA 1.017 05/03/2022    PHUA 5.0 05/03/2022    PROTEINUA  05/03/2022      Comment:      N/A    GLUCOSEUA  05/03/2022      Comment:      N/A    KETONESUA  05/03/2022      Comment:       N/A    BLOODUA  05/03/2022      Comment:      N/A    NITRITESUA  05/03/2022      Comment:      N/A due to urine color. Biochemical results can not be resulted.     LEUKOCYTESUR  05/03/2022      Comment:      N/A    RBCUA 10.0 05/03/2022    WBCUA 200.0 05/03/2022    BACTERIA 3+ (A) 05/03/2022

## 2023-08-29 ENCOUNTER — TELEPHONE (OUTPATIENT)
Dept: INTERNAL MEDICINE | Facility: CLINIC | Age: 68
End: 2023-08-29
Payer: MEDICARE

## 2023-08-29 NOTE — TELEPHONE ENCOUNTER
Called and gave results and recommendations. Patient stated ENT told her to stay on the Allegra and start the Mucinex again.He cleaned the wax out her left ear.

## 2023-08-29 NOTE — TELEPHONE ENCOUNTER
----- Message from Ursula Willis NP sent at 8/29/2023  9:42 AM CDT -----  Please call and check on patient. Let her know that viral panel is neg. Verify if changes to POC from ENT visit yesterday please.

## 2023-08-31 ENCOUNTER — PATIENT MESSAGE (OUTPATIENT)
Dept: INTERNAL MEDICINE | Facility: CLINIC | Age: 68
End: 2023-08-31
Payer: MEDICARE

## 2023-09-01 ENCOUNTER — HOSPITAL ENCOUNTER (EMERGENCY)
Facility: HOSPITAL | Age: 68
Discharge: HOME | End: 2023-09-01
Attending: EMERGENCY MEDICINE
Payer: MEDICARE

## 2023-09-01 VITALS
WEIGHT: 174 LBS | RESPIRATION RATE: 18 BRPM | HEART RATE: 89 BPM | DIASTOLIC BLOOD PRESSURE: 79 MMHG | TEMPERATURE: 99 F | BODY MASS INDEX: 34.16 KG/M2 | OXYGEN SATURATION: 96 % | SYSTOLIC BLOOD PRESSURE: 156 MMHG | HEIGHT: 60 IN

## 2023-09-01 DIAGNOSIS — S50.01XA CONTUSION OF RIGHT ELBOW, INITIAL ENCOUNTER: ICD-10-CM
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
## 2023-09-01 DIAGNOSIS — S02.2XXA CLOSED FRACTURE OF NASAL BONE, INITIAL ENCOUNTER: Primary | ICD-10-CM

## 2023-09-01 DIAGNOSIS — S80.01XA CONTUSION OF RIGHT KNEE, INITIAL ENCOUNTER: ICD-10-CM

## 2023-09-01 DIAGNOSIS — S00.81XA ABRASION, FACE WITHOUT INFECTION: ICD-10-CM

## 2023-09-01 PROCEDURE — 25000003 PHARM REV CODE 250: Performed by: NURSE PRACTITIONER

## 2023-09-01 PROCEDURE — 99284 EMERGENCY DEPT VISIT MOD MDM: CPT | Mod: 25

## 2023-09-01 RX ORDER — ACETAMINOPHEN 500 MG
1000 TABLET ORAL
Status: COMPLETED | OUTPATIENT
Start: 2023-09-01 | End: 2023-09-01

## 2023-09-01 RX ADMIN — ACETAMINOPHEN 1000 MG: 500 TABLET ORAL at 05:09
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
## 2023-09-11 ENCOUNTER — TELEPHONE (OUTPATIENT)
Dept: INTERNAL MEDICINE | Facility: CLINIC | Age: 68
End: 2023-09-11
Payer: MEDICARE
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
## 2023-09-18 DIAGNOSIS — Z00.00 WELLNESS EXAMINATION: Primary | ICD-10-CM

## 2023-09-18 DIAGNOSIS — E03.9 HYPOTHYROIDISM, UNSPECIFIED TYPE: ICD-10-CM

## 2023-09-18 DIAGNOSIS — I10 HYPERTENSION, UNSPECIFIED TYPE: ICD-10-CM

## 2023-09-18 DIAGNOSIS — E66.01 MORBID OBESITY: ICD-10-CM

## 2023-09-18 DIAGNOSIS — R73.01 IFG (IMPAIRED FASTING GLUCOSE): ICD-10-CM

## 2023-09-19 ENCOUNTER — TELEPHONE (OUTPATIENT)
Dept: INTERNAL MEDICINE | Facility: CLINIC | Age: 68
End: 2023-09-19
Payer: MEDICARE

## 2023-09-19 NOTE — TELEPHONE ENCOUNTER
----- Message from Manuel Barr LPN sent at 9/18/2023  7:57 AM CDT -----  Regarding: kwame wilson 9/25 @2  Are there any outstanding tasks in patient chart?needs fasting labs    Is there documentation of outstanding tasks in patient chart? no    Has patient been to the ER, urgent care, or another physician since last visit?    Has patient done any blood work or x-rays since last visit?

## 2023-09-21 ENCOUNTER — LAB VISIT (OUTPATIENT)
Dept: LAB | Facility: HOSPITAL | Age: 68
End: 2023-09-21
Attending: INTERNAL MEDICINE
Payer: MEDICARE

## 2023-09-21 ENCOUNTER — TELEPHONE (OUTPATIENT)
Dept: INTERNAL MEDICINE | Facility: CLINIC | Age: 68
End: 2023-09-21
Payer: MEDICARE

## 2023-09-21 DIAGNOSIS — E03.9 HYPOTHYROIDISM, UNSPECIFIED TYPE: ICD-10-CM

## 2023-09-21 DIAGNOSIS — I10 HYPERTENSION, UNSPECIFIED TYPE: ICD-10-CM

## 2023-09-21 DIAGNOSIS — E66.01 MORBID OBESITY: ICD-10-CM

## 2023-09-21 DIAGNOSIS — Z00.00 WELLNESS EXAMINATION: ICD-10-CM

## 2023-09-21 DIAGNOSIS — R73.01 IFG (IMPAIRED FASTING GLUCOSE): ICD-10-CM

## 2023-09-21 LAB
ALBUMIN SERPL-MCNC: 3.3 G/DL (ref 3.4–4.8)
ALBUMIN/GLOB SERPL: 0.8 RATIO (ref 1.1–2)
ALP SERPL-CCNC: 98 UNIT/L (ref 40–150)
ALT SERPL-CCNC: 12 UNIT/L (ref 0–55)
APPEARANCE UR: ABNORMAL
AST SERPL-CCNC: 16 UNIT/L (ref 5–34)
BACTERIA #/AREA URNS AUTO: ABNORMAL /HPF
BASOPHILS # BLD AUTO: 0.08 X10(3)/MCL
BASOPHILS NFR BLD AUTO: 1.1 %
BILIRUB SERPL-MCNC: 0.5 MG/DL
BILIRUB UR QL STRIP.AUTO: NEGATIVE
BUN SERPL-MCNC: 11.6 MG/DL (ref 9.8–20.1)
CALCIUM SERPL-MCNC: 9.3 MG/DL (ref 8.4–10.2)
CHLORIDE SERPL-SCNC: 107 MMOL/L (ref 98–107)
CHOLEST SERPL-MCNC: 230 MG/DL
CHOLEST/HDLC SERPL: 5 {RATIO} (ref 0–5)
CO2 SERPL-SCNC: 28 MMOL/L (ref 23–31)
COLOR UR: ABNORMAL
CREAT SERPL-MCNC: 0.72 MG/DL (ref 0.55–1.02)
EOSINOPHIL # BLD AUTO: 0.13 X10(3)/MCL (ref 0–0.9)
EOSINOPHIL NFR BLD AUTO: 1.8 %
ERYTHROCYTE [DISTWIDTH] IN BLOOD BY AUTOMATED COUNT: 13 % (ref 11.5–17)
EST. AVERAGE GLUCOSE BLD GHB EST-MCNC: 93.9 MG/DL
GFR SERPLBLD CREATININE-BSD FMLA CKD-EPI: >60 MLS/MIN/1.73/M2
GLOBULIN SER-MCNC: 4.1 GM/DL (ref 2.4–3.5)
GLUCOSE SERPL-MCNC: 88 MG/DL (ref 82–115)
GLUCOSE UR QL STRIP.AUTO: NEGATIVE
HBA1C MFR BLD: 4.9 %
HCT VFR BLD AUTO: 42.3 % (ref 37–47)
HDLC SERPL-MCNC: 46 MG/DL (ref 35–60)
HGB BLD-MCNC: 13.7 G/DL (ref 12–16)
IMM GRANULOCYTES # BLD AUTO: 0 X10(3)/MCL (ref 0–0.04)
IMM GRANULOCYTES NFR BLD AUTO: 0 %
KETONES UR QL STRIP.AUTO: NEGATIVE
LDLC SERPL CALC-MCNC: 152 MG/DL (ref 50–140)
LEUKOCYTE ESTERASE UR QL STRIP.AUTO: ABNORMAL
LYMPHOCYTES # BLD AUTO: 2.93 X10(3)/MCL (ref 0.6–4.6)
LYMPHOCYTES NFR BLD AUTO: 39.5 %
MCH RBC QN AUTO: 29.3 PG (ref 27–31)
MCHC RBC AUTO-ENTMCNC: 32.4 G/DL (ref 33–36)
MCV RBC AUTO: 90.6 FL (ref 80–94)
MONOCYTES # BLD AUTO: 0.51 X10(3)/MCL (ref 0.1–1.3)
MONOCYTES NFR BLD AUTO: 6.9 %
NEUTROPHILS # BLD AUTO: 3.77 X10(3)/MCL (ref 2.1–9.2)
NEUTROPHILS NFR BLD AUTO: 50.7 %
NITRITE UR QL STRIP.AUTO: NEGATIVE
NRBC BLD AUTO-RTO: 0 %
PH UR STRIP.AUTO: 6.5 [PH]
PLATELET # BLD AUTO: 230 X10(3)/MCL (ref 130–400)
PMV BLD AUTO: 10.5 FL (ref 7.4–10.4)
POTASSIUM SERPL-SCNC: 3.8 MMOL/L (ref 3.5–5.1)
PROT SERPL-MCNC: 7.4 GM/DL (ref 5.8–7.6)
PROT UR QL STRIP.AUTO: NEGATIVE
RBC # BLD AUTO: 4.67 X10(6)/MCL (ref 4.2–5.4)
RBC #/AREA URNS AUTO: ABNORMAL /HPF
RBC UR QL AUTO: ABNORMAL
SODIUM SERPL-SCNC: 145 MMOL/L (ref 136–145)
SP GR UR STRIP.AUTO: 1.01 (ref 1–1.03)
SQUAMOUS #/AREA URNS AUTO: ABNORMAL /HPF
T4 FREE SERPL-MCNC: 1.02 NG/DL (ref 0.7–1.48)
TRIGL SERPL-MCNC: 158 MG/DL (ref 37–140)
TSH SERPL-ACNC: 3.34 UIU/ML (ref 0.35–4.94)
UROBILINOGEN UR STRIP-ACNC: 0.2
VLDLC SERPL CALC-MCNC: 32 MG/DL
WBC # SPEC AUTO: 7.42 X10(3)/MCL (ref 4.5–11.5)
WBC #/AREA URNS AUTO: ABNORMAL /HPF

## 2023-09-21 PROCEDURE — 36415 COLL VENOUS BLD VENIPUNCTURE: CPT

## 2023-09-21 PROCEDURE — 80053 COMPREHEN METABOLIC PANEL: CPT

## 2023-09-21 PROCEDURE — 85025 COMPLETE CBC W/AUTO DIFF WBC: CPT

## 2023-09-21 PROCEDURE — 84443 ASSAY THYROID STIM HORMONE: CPT

## 2023-09-21 PROCEDURE — 83036 HEMOGLOBIN GLYCOSYLATED A1C: CPT

## 2023-09-21 PROCEDURE — 84439 ASSAY OF FREE THYROXINE: CPT

## 2023-09-21 PROCEDURE — 80061 LIPID PANEL: CPT

## 2023-09-21 PROCEDURE — 81001 URINALYSIS AUTO W/SCOPE: CPT

## 2023-09-21 NOTE — TELEPHONE ENCOUNTER
----- Message from Pema Rg sent at 9/21/2023  9:06 AM CDT -----  Regarding: orders  .Type:  Needs Medical Advice    Who Called: pt  Symptoms (please be specific): coughing, chest pain   How long has patient had these symptoms:  6 weeks  Pharmacy name and phone #:    Would the patient rather a call back or a response via [facility]? Call back  Best Call Back Number: [phone]  Additional Information: Requesting Chest X-ray, states she's been coughing for the last 6 weeks  accompanied with pain..

## 2023-09-25 ENCOUNTER — OFFICE VISIT (OUTPATIENT)
Dept: INTERNAL MEDICINE | Facility: CLINIC | Age: 68
End: 2023-09-25
Payer: MEDICARE

## 2023-09-25 VITALS
OXYGEN SATURATION: 96 % | DIASTOLIC BLOOD PRESSURE: 78 MMHG | RESPIRATION RATE: 18 BRPM | SYSTOLIC BLOOD PRESSURE: 114 MMHG | HEIGHT: 60 IN | WEIGHT: 179 LBS | HEART RATE: 85 BPM | BODY MASS INDEX: 35.14 KG/M2

## 2023-09-25 DIAGNOSIS — K21.9 GASTROESOPHAGEAL REFLUX DISEASE WITHOUT ESOPHAGITIS: ICD-10-CM

## 2023-09-25 DIAGNOSIS — M32.9 SYSTEMIC LUPUS ERYTHEMATOSUS, UNSPECIFIED SLE TYPE, UNSPECIFIED ORGAN INVOLVEMENT STATUS: Primary | ICD-10-CM

## 2023-09-25 DIAGNOSIS — E78.2 MIXED HYPERLIPIDEMIA: ICD-10-CM

## 2023-09-25 DIAGNOSIS — I82.4Y9 DEEP VEIN THROMBOSIS (DVT) OF PROXIMAL LOWER EXTREMITY, UNSPECIFIED CHRONICITY, UNSPECIFIED LATERALITY: ICD-10-CM

## 2023-09-25 DIAGNOSIS — Z00.00 ANNUAL PHYSICAL EXAM: ICD-10-CM

## 2023-09-25 PROCEDURE — G0439 PPPS, SUBSEQ VISIT: HCPCS | Mod: ,,, | Performed by: INTERNAL MEDICINE

## 2023-09-25 PROCEDURE — 3008F PR BODY MASS INDEX (BMI) DOCUMENTED: ICD-10-PCS | Mod: CPTII,,, | Performed by: INTERNAL MEDICINE

## 2023-09-25 PROCEDURE — 3078F DIAST BP <80 MM HG: CPT | Mod: CPTII,,, | Performed by: INTERNAL MEDICINE

## 2023-09-25 PROCEDURE — 3074F PR MOST RECENT SYSTOLIC BLOOD PRESSURE < 130 MM HG: ICD-10-PCS | Mod: CPTII,,, | Performed by: INTERNAL MEDICINE

## 2023-09-25 PROCEDURE — 1101F PR PT FALLS ASSESS DOC 0-1 FALLS W/OUT INJ PAST YR: ICD-10-PCS | Mod: CPTII,,, | Performed by: INTERNAL MEDICINE

## 2023-09-25 PROCEDURE — 3288F PR FALLS RISK ASSESSMENT DOCUMENTED: ICD-10-PCS | Mod: CPTII,,, | Performed by: INTERNAL MEDICINE

## 2023-09-25 PROCEDURE — 4010F PR ACE/ARB THEARPY RXD/TAKEN: ICD-10-PCS | Mod: CPTII,,, | Performed by: INTERNAL MEDICINE

## 2023-09-25 PROCEDURE — G0439 PR MEDICARE ANNUAL WELLNESS SUBSEQUENT VISIT: ICD-10-PCS | Mod: ,,, | Performed by: INTERNAL MEDICINE

## 2023-09-25 PROCEDURE — 1101F PT FALLS ASSESS-DOCD LE1/YR: CPT | Mod: CPTII,,, | Performed by: INTERNAL MEDICINE

## 2023-09-25 PROCEDURE — 3044F PR MOST RECENT HEMOGLOBIN A1C LEVEL <7.0%: ICD-10-PCS | Mod: CPTII,,, | Performed by: INTERNAL MEDICINE

## 2023-09-25 PROCEDURE — 3044F HG A1C LEVEL LT 7.0%: CPT | Mod: CPTII,,, | Performed by: INTERNAL MEDICINE

## 2023-09-25 PROCEDURE — 3288F FALL RISK ASSESSMENT DOCD: CPT | Mod: CPTII,,, | Performed by: INTERNAL MEDICINE

## 2023-09-25 PROCEDURE — 3008F BODY MASS INDEX DOCD: CPT | Mod: CPTII,,, | Performed by: INTERNAL MEDICINE

## 2023-09-25 PROCEDURE — 3074F SYST BP LT 130 MM HG: CPT | Mod: CPTII,,, | Performed by: INTERNAL MEDICINE

## 2023-09-25 PROCEDURE — 4010F ACE/ARB THERAPY RXD/TAKEN: CPT | Mod: CPTII,,, | Performed by: INTERNAL MEDICINE

## 2023-09-25 PROCEDURE — 1159F MED LIST DOCD IN RCRD: CPT | Mod: CPTII,,, | Performed by: INTERNAL MEDICINE

## 2023-09-25 PROCEDURE — 1159F PR MEDICATION LIST DOCUMENTED IN MEDICAL RECORD: ICD-10-PCS | Mod: CPTII,,, | Performed by: INTERNAL MEDICINE

## 2023-09-25 PROCEDURE — 3078F PR MOST RECENT DIASTOLIC BLOOD PRESSURE < 80 MM HG: ICD-10-PCS | Mod: CPTII,,, | Performed by: INTERNAL MEDICINE

## 2023-09-25 RX ORDER — ROSUVASTATIN CALCIUM 10 MG/1
10 TABLET, COATED ORAL DAILY
Qty: 90 TABLET | Refills: 3 | Status: SHIPPED | OUTPATIENT
Start: 2023-09-25 | End: 2023-10-26

## 2023-09-25 NOTE — PROGRESS NOTES
Patient ID: Varsha Rg is a 68 y.o. female.    Chief Complaint: Medicare AWV (Labs 9/21)      Patient Care Team:  Amrit Vogt II, MD as PCP - General (Internal Medicine)  Kathia Aldridge III, MD as Obstetrician (Obstetrics and Gynecology)  Nomi Olivares MD as Consulting Physician (Gastroenterology)     HPI:   Patient presents here today for above reason.     Ms. Nazario screening is up-to-date here for follow-up is a 68-year-old female presents today in follow-up.  Have blood work done here for review.  Did gastric sleeve and is down 63 lb since January.  Had blood work done here for review rest of her age-appropriate    The patient's Health Maintenance was reviewed and the following appears to be due at this time:   Health Maintenance Due   Topic Date Due    Hepatitis C Screening  Never done    Colorectal Cancer Screening  08/04/2020        Past Medical History:  Past Medical History:   Diagnosis Date    Benign paroxysmal vertigo, bilateral     Concussion     Fibromyalgia     Hypertension     Personal history of colonic polyps 08/04/2015    Skin cancer     SOB (shortness of breath)     Systemic lupus erythematosus, organ or system involvement unspecified     Unspecified cataract      Past Surgical History:   Procedure Laterality Date    APPENDECTOMY      CATARACT EXTRACTION Right 06/14/2022    CATARACT EXTRACTION Left 06/28/2022    CHOLECYSTECTOMY      COLONOSCOPY  08/04/2015    GASTRECTOMY      HYSTERECTOMY      KNEE SURGERY      WRIST FRACTURE SURGERY       Review of patient's allergies indicates:   Allergen Reactions    Azithromycin     Levaquin [levofloxacin]     Lortab [hydrocodone-acetaminophen]     Pcn [penicillins]      Current Outpatient Medications on File Prior to Visit   Medication Sig Dispense Refill    albuterol (PROVENTIL/VENTOLIN HFA) 90 mcg/actuation inhaler Inhale 2 puffs into the lungs every 6 (six) hours as needed for Wheezing. 18 g 1    clonazePAM (KLONOPIN) 2 MG Tab  TAKE TWO TABLETS BY MOUTH EVERY NIGHT AT BEDTIME 60 tablet 3    ergocalciferol (ERGOCALCIFEROL) 50,000 unit Cap Take 1 capsule (50,000 Units total) by mouth every 7 days. 12 capsule 3    furosemide (LASIX) 20 MG tablet Take 20 mg by mouth daily as needed.      meclizine (ANTIVERT) 12.5 mg tablet Take 12.5 mg by mouth 3 (three) times daily as needed.      pantoprazole (PROTONIX) 40 MG tablet TAKE ONE TABLET BY MOUTH DAILY 30 tablet 6    irbesartan (AVAPRO) 75 MG tablet        No current facility-administered medications on file prior to visit.     Social History     Socioeconomic History    Marital status:    Tobacco Use    Smoking status: Never    Smokeless tobacco: Never   Substance and Sexual Activity    Drug use: Never     Social Determinants of Health     Financial Resource Strain: Low Risk  (9/26/2022)    Overall Financial Resource Strain (CARDIA)     Difficulty of Paying Living Expenses: Not hard at all   Food Insecurity: No Food Insecurity (9/26/2022)    Hunger Vital Sign     Worried About Running Out of Food in the Last Year: Never true     Ran Out of Food in the Last Year: Never true   Transportation Needs: No Transportation Needs (9/26/2022)    PRAPARE - Transportation     Lack of Transportation (Medical): No     Lack of Transportation (Non-Medical): No   Stress: No Stress Concern Present (9/26/2022)    [employer] - Occupational Stress Questionnaire     Feeling of Stress : Not at all   Housing Stability: Low Risk  (9/26/2022)    Housing Stability Vital Sign     Unable to Pay for Housing in the Last Year: No     Number of Places Lived in the Last Year: 1     Unstable Housing in the Last Year: No     No family history on file.    ROS:   Review of Systems  Constitutional: No weight gain, No fever, No chills, No fatigue.   Eyes: No blurring, No visual disturbances.   Ear/Nose/Mouth/Throat: No decreased hearing, No ear pain, No nasal congestion, No sore throat.    Respiratory: No shortness of breath, No cough, No wheezing.   Cardiovascular: No chest pain, No palpitations, No peripheral edema.   Gastrointestinal: No nausea, No vomiting, No diarrhea, No constipation, No abdominal pain.   Genitourinary: No dysuria, No hematuria.   Hematology/Lymphatics: No bruising tendency, No bleeding tendency, No swollen lymph glands.   Endocrine: No excessive thirst, No polyuria, No excessive hunger.   Musculoskeletal: No joint pain, No muscle pain, No decreased range of motion.   Integumentary: No rash, No pruritus.   Neurologic: No abnormal balance, No confusion, No headache.   Psychiatric: No anxiety, No depression, Not suicidal, No hallucinations.        Patient Reported Health Risk Assessment  What [age]  Are you male or female?: Female  During the past four weeks, how much have you been bothered by emotional problems such as feeling anxious, depressed, irritable, sad, or downhearted and blue?: Not at all  During the past five weeks, has your physical and/or emotional health limited your social activities with family, friends, neighbors, or groups?: Not at all  During the past four weeks, how much bodily pain have you generally had?: No pain  During the past four weeks, was someone available to help if you needed and wanted help?: Yes, as much as I wanted  During the past four weeks, what was the hardest physical activity you could do for at least two minutes?: Moderate  Can you get to places out of walking distance without help?  (For example, can you travel alone on buses or taxis, or drive your own car?): Yes  Can you go shopping for groceries or clothes without someone's help?: Yes  Can you prepare your own meals?: Yes  Can you do your own housework without help?: Yes  Because of any health problems, do you need the help of another person with your personal care needs such as eating, bathing, dressing, or getting around the house?: No  Can you handle your own money  without help?: Yes  During the past four weeks, how would you rate your health in general?: Excellent  How have things been going for you during the past four weeks?: Very well  Are you having difficulties driving your car?: No  Do you always fasten your seat belt when you are in a car?: Yes, usually  How often in the past four weeks have you been bothered by falling or dizzy when standing up?: Never  How often in the past four weeks have you been bothered by sexual problems?: Never  How often in the past four weeks have you been bothered by trouble eating well?: Never  How often in the past four weeks have you been bothered by teeth or denture problems?: Never  How often in the past four weeks have you been bothered with problems using the telephone?: Never  How often in the past four weeks have you been bothered by tiredness or fatigue?: Never  Have you fallen two or more times in the past year?: No  Are you afraid of falling?: No  Are you a smoker?: No  During the past four weeks, how many drinks of wine, beer, or other alcoholic beverages did you have?: No alcohol at all  Do you exercise for about 20 minutes three or more days a week?: Yes, some of the time  Have you been given any information to help you with hazards in your house that might hurt you?: Yes  Have you been given any information to help you with keeping track of your medications?: Yes  How often do you have trouble taking medicines the way you've been told to take them?: I always take them as prescribed  How confident are you that you can control and manage most of your health problems?: Very confident  What is your race? (Check all that apply.):     Vitals/PE:   /78 (BP Location: Left arm, Patient Position: Sitting)   Pulse 85   Resp 18   Ht 5' (1.524 m)   Wt 81.2 kg (179 lb)   SpO2 96%   BMI 34.96 kg/m²   Physical Exam    General: Alert and oriented, No acute distress.   Eye: Normal conjunctiva without exudate.  HENMT:  "Normocephalic/AT, Normal hearing, Oral mucosa is moist and pink   Neck: No goiter visualized.   Respiratory: Lungs CTAB, Respirations are non-labored, Breath sounds are equal, Symmetrical chest wall expansion.  Cardiovascular: Normal rate, Regular rhythm, No murmur, No edema.   Gastrointestinal: Non-distended.   Genitourinary: Deferred.  Musculoskeletal: Normal ROM, Normal gait, No deformities or amputations.  Integumentary: Warm, Dry, Intact. No diaphoresis, or flushing.  Neurologic: No focal deficits, Cranial Nerves II-XII are grossly intact.   Psychiatric: Cooperative, Appropriate mood & affect, Normal judgment, Non-suicidal.             No data to display                  9/25/2023     2:00 PM 3/27/2023     3:00 PM 9/26/2022     2:40 PM   Fall Risk Assessment - Outpatient   Mobility Status Ambulatory Ambulatory Ambulatory   Number of falls 0 0 0   Identified as fall risk False False False           Depression Screening  Over the past two weeks, has the patient felt down, depressed, or hopeless?: No  Over the past two weeks, has the patient felt little interest or pleasure in doing things?: No  Functional Ability/Safety Screening  Was the patient's timed Up & Go test unsteady or longer than 30 seconds?: No  Does the patient need help with phone, transportation, shopping, preparing meals, housework, laundry, meds, or managing money?: No  Does the patient's home have rugs in the hallway, lack grab bars in the bathroom, lack handrails on the stairs or have poor lighting?: No  Have you noticed any hearing difficulties?: No  A "Yes" response to any of the above questions should trigger further investigation.: 0  Cognitive Function (Assessed through direct observation with due consideration of information obtained by way of patient reports and/or concerns raised by family, friends, caretakers, or others)    Does the patient repeat questions/statements in the same day?: No  Does the patient have trouble remembering the " "date, year, and time?: No  Does the patient have difficulty managing finances?: No  Does the patient have a decreased sense of direction?: No  A "Yes" response to any of the above questions could indicate mild cognitive impairment and should trigger further investigation.: 0    Assessment/Plan:       1. Systemic lupus erythematosus, unspecified SLE type, unspecified organ involvement status  Overview:  Screening uptodate  Cont current rx.       2. Deep vein thrombosis (DVT) of proximal lower extremity, unspecified chronicity, unspecified laterality  Overview:  resolved      3. Mixed hyperlipidemia  Overview:  Start statin, crestor 10mg       4. Gastroesophageal reflux disease without esophagitis  Overview:  On PPI, will be seeing GI  cpmm      5. Annual physical exam   Screening uptodate   Doing great   Cpm            Education and counseling done face to face regarding medical conditions and plan. Contact office if new symptoms develop. Should any symptoms ever significantly worsen seek emergency medical attention/go to ER. Follow up at least yearly for wellness or sooner PRN. Nurse to call patient with any results. The patient is receptive, expresses understanding and is agreeable to plan. All questions have been answered.    No follow-ups on file.      Medicare Annual Wellness and Personalized Prevention Plan:   Fall Risk + Home Safety + Hearing Impairment + Depression Screen + Cognitive Impairment Screen + Health Risk Assessment all reviewed.    Advance Care Planning   I attest to discussing Advance Care Planning with patient and/or family member.  Education was provided including the importance of the Health Care Power of , Advance Directives, and/or [place] documentation.  The patient expressed understanding to the importance of this information and discussion.  Length of ACP conversation in minutes:          Opioid Screening: Patient medication list reviewed, patient is not taking prescription opioids. " Patient is not using additional opioids than prescribed. Patient is at low risk of substance abuse based on this opioid use history.

## 2023-10-03 ENCOUNTER — TELEPHONE (OUTPATIENT)
Dept: SURGERY | Facility: CLINIC | Age: 68
End: 2023-10-03
Payer: MEDICARE

## 2023-10-03 DIAGNOSIS — Z13.0 SCREENING, ANEMIA, DEFICIENCY, IRON: ICD-10-CM

## 2023-10-03 DIAGNOSIS — Z13.21 ENCOUNTER FOR VITAMIN DEFICIENCY SCREENING: ICD-10-CM

## 2023-10-03 NOTE — TELEPHONE ENCOUNTER
----- Message from Isa Marquez sent at 9/28/2023  4:17 PM CDT -----  Regarding: FUSLE / Labs  Patient called requesting to schedule annual visit being that she did not attend at the beginning of the year.   Scheduled: 10/26 @ 2:30   Patient stated she recently had labs with Dr. Vogt

## 2023-10-05 ENCOUNTER — TELEPHONE (OUTPATIENT)
Dept: INTERNAL MEDICINE | Facility: CLINIC | Age: 68
End: 2023-10-05
Payer: MEDICARE

## 2023-10-05 DIAGNOSIS — M32.9 SYSTEMIC LUPUS ERYTHEMATOSUS, UNSPECIFIED SLE TYPE, UNSPECIFIED ORGAN INVOLVEMENT STATUS: Primary | ICD-10-CM

## 2023-10-05 RX ORDER — TIZANIDINE 4 MG/1
4 TABLET ORAL 2 TIMES DAILY PRN
Qty: 30 TABLET | Refills: 0 | Status: SHIPPED | OUTPATIENT
Start: 2023-10-05 | End: 2023-10-15

## 2023-10-05 NOTE — TELEPHONE ENCOUNTER
----- Message from Cate Pendleton sent at 10/5/2023  4:22 PM CDT -----  Regarding: advice  Type:  Needs Medical Advice    Who Called: pt  Symptoms (please be specific):    How long has patient had these symptoms:    Pharmacy name and phone #:    Would the patient rather a call back or a response via [facility]?   Best Call Back Number: [phone]  Additional Information: pt called about having pain in her ribs going into her back. She stated that it might be bruised ribs. She said that she had a xray done recently that didn't show anything but she is still in pain. Please advise

## 2023-10-20 ENCOUNTER — LAB VISIT (OUTPATIENT)
Dept: LAB | Facility: HOSPITAL | Age: 68
End: 2023-10-20
Attending: SURGERY
Payer: MEDICARE

## 2023-10-20 DIAGNOSIS — D64.9 ANEMIA, UNSPECIFIED TYPE: ICD-10-CM

## 2023-10-20 DIAGNOSIS — R79.9 ABNORMAL FINDING OF BLOOD CHEMISTRY, UNSPECIFIED: ICD-10-CM

## 2023-10-20 DIAGNOSIS — Z13.21 ENCOUNTER FOR VITAMIN DEFICIENCY SCREENING: ICD-10-CM

## 2023-10-20 DIAGNOSIS — E66.01 MORBID OBESITY: ICD-10-CM

## 2023-10-20 DIAGNOSIS — E53.8 B12 DEFICIENCY: ICD-10-CM

## 2023-10-20 DIAGNOSIS — Z13.21 SCREENING FOR MALNUTRITION: ICD-10-CM
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
## 2023-10-20 DIAGNOSIS — R73.01 IFG (IMPAIRED FASTING GLUCOSE): ICD-10-CM

## 2023-10-20 DIAGNOSIS — I10 HYPERTENSION, UNSPECIFIED TYPE: ICD-10-CM

## 2023-10-20 DIAGNOSIS — Z13.0 SCREENING, ANEMIA, DEFICIENCY, IRON: ICD-10-CM

## 2023-10-20 DIAGNOSIS — T45.2X6A UNDERDOSING OF VITAMIN, INITIAL ENCOUNTER: ICD-10-CM

## 2023-10-20 DIAGNOSIS — E03.9 HYPOTHYROIDISM, UNSPECIFIED TYPE: ICD-10-CM

## 2023-10-20 LAB
IRON SATN MFR SERPL: 38 % (ref 20–50)
IRON SERPL-MCNC: 88 UG/DL (ref 50–170)
TIBC SERPL-MCNC: 141 UG/DL (ref 70–310)
TIBC SERPL-MCNC: 229 UG/DL (ref 250–450)
TRANSFERRIN SERPL-MCNC: 191 MG/DL (ref 173–360)
VIT B12 SERPL-MCNC: 1323 PG/ML (ref 213–816)

## 2023-10-20 PROCEDURE — 84425 ASSAY OF VITAMIN B-1: CPT | Mod: GA

## 2023-10-20 PROCEDURE — 82607 VITAMIN B-12: CPT

## 2023-10-20 PROCEDURE — 36415 COLL VENOUS BLD VENIPUNCTURE: CPT

## 2023-10-20 PROCEDURE — 83540 ASSAY OF IRON: CPT

## 2023-10-24 ENCOUNTER — TELEPHONE (OUTPATIENT)
Dept: MEDSURG UNIT | Facility: HOSPITAL | Age: 68
End: 2023-10-24
Payer: MEDICARE

## 2023-10-24 NOTE — TELEPHONE ENCOUNTER
Spoke to pt and explained her iron and vit B12 results. Answered all her questions and she feels much better. She did also have some diet questions that we discussed and  she will also discuss that with Dr Elmore. Enc her to call if needs.          ----- Message from Juanita Samson sent at 10/24/2023  2:03 PM CDT -----  Regarding: Pt's BW  Pt called asking to speak to someone regarding her BW ASAP!  She has an appt w/ Dr. Elmore on Thursday but does not want to wait until then.   Was advised by a family friend, who used to by an internist, to call the office for someone to review the labs stat due to abnormal findings.  Pt would like a call back prior to appt.

## 2023-10-25 LAB — VIT B1 BLD-SCNC: 132 NMOL/L (ref 70–180)

## 2023-10-26 ENCOUNTER — OFFICE VISIT (OUTPATIENT)
Dept: SURGERY | Facility: CLINIC | Age: 68
End: 2023-10-26
Payer: MEDICARE

## 2023-10-26 VITALS
HEART RATE: 74 BPM | DIASTOLIC BLOOD PRESSURE: 62 MMHG | HEIGHT: 60 IN | SYSTOLIC BLOOD PRESSURE: 131 MMHG | WEIGHT: 176.81 LBS | BODY MASS INDEX: 34.71 KG/M2

## 2023-10-26 DIAGNOSIS — E66.01 MORBID OBESITY: Primary | ICD-10-CM

## 2023-10-26 DIAGNOSIS — L98.7 EXCESS SKIN: Primary | ICD-10-CM

## 2023-10-26 PROCEDURE — 1101F PR PT FALLS ASSESS DOC 0-1 FALLS W/OUT INJ PAST YR: ICD-10-PCS | Mod: CPTII,,, | Performed by: SURGERY

## 2023-10-26 PROCEDURE — 3008F BODY MASS INDEX DOCD: CPT | Mod: CPTII,,, | Performed by: SURGERY

## 2023-10-26 PROCEDURE — 3075F SYST BP GE 130 - 139MM HG: CPT | Mod: CPTII,,, | Performed by: SURGERY

## 2023-10-26 PROCEDURE — 3078F PR MOST RECENT DIASTOLIC BLOOD PRESSURE < 80 MM HG: ICD-10-PCS | Mod: CPTII,,, | Performed by: SURGERY

## 2023-10-26 PROCEDURE — 99214 PR OFFICE/OUTPT VISIT, EST, LEVL IV, 30-39 MIN: ICD-10-PCS | Mod: ,,, | Performed by: SURGERY

## 2023-10-26 PROCEDURE — 3288F FALL RISK ASSESSMENT DOCD: CPT | Mod: CPTII,,, | Performed by: SURGERY

## 2023-10-26 PROCEDURE — 3288F PR FALLS RISK ASSESSMENT DOCUMENTED: ICD-10-PCS | Mod: CPTII,,, | Performed by: SURGERY

## 2023-10-26 PROCEDURE — 3008F PR BODY MASS INDEX (BMI) DOCUMENTED: ICD-10-PCS | Mod: CPTII,,, | Performed by: SURGERY

## 2023-10-26 PROCEDURE — 4010F ACE/ARB THERAPY RXD/TAKEN: CPT | Mod: CPTII,,, | Performed by: SURGERY

## 2023-10-26 PROCEDURE — 1101F PT FALLS ASSESS-DOCD LE1/YR: CPT | Mod: CPTII,,, | Performed by: SURGERY

## 2023-10-26 PROCEDURE — 3075F PR MOST RECENT SYSTOLIC BLOOD PRESS GE 130-139MM HG: ICD-10-PCS | Mod: CPTII,,, | Performed by: SURGERY

## 2023-10-26 PROCEDURE — 99214 OFFICE O/P EST MOD 30 MIN: CPT | Mod: ,,, | Performed by: SURGERY

## 2023-10-26 PROCEDURE — 3044F PR MOST RECENT HEMOGLOBIN A1C LEVEL <7.0%: ICD-10-PCS | Mod: CPTII,,, | Performed by: SURGERY

## 2023-10-26 PROCEDURE — 3078F DIAST BP <80 MM HG: CPT | Mod: CPTII,,, | Performed by: SURGERY

## 2023-10-26 PROCEDURE — 4010F PR ACE/ARB THEARPY RXD/TAKEN: ICD-10-PCS | Mod: CPTII,,, | Performed by: SURGERY

## 2023-10-26 PROCEDURE — 3044F HG A1C LEVEL LT 7.0%: CPT | Mod: CPTII,,, | Performed by: SURGERY

## 2023-10-26 PROCEDURE — 1159F PR MEDICATION LIST DOCUMENTED IN MEDICAL RECORD: ICD-10-PCS | Mod: CPTII,,, | Performed by: SURGERY

## 2023-10-26 PROCEDURE — 1159F MED LIST DOCD IN RCRD: CPT | Mod: CPTII,,, | Performed by: SURGERY

## 2023-10-26 NOTE — PROGRESS NOTES
[facility]  [address] [address]  Phone: [phone]  Fax: [phone]    [facility]  Post op Bariatric Note  Dr. Jake Elmore      Patient: Varsha Rg  Date: 10/26/2023   Author: Jake Elmore MD    Chief Complaint:   Chief Complaint   Patient presents with    Follow-up     VSG 1/24/22 w hiatal hernia repair        History of Present Illness:  Ms. Varsha Rg is a 68 y.o. female who is 2 months s/p Lap Sleeve Gastrectomy on 1/24/22 by Dr. Jake Elmore.   Pathology benign.   Presents today for routine 2 month follow up appt.   No complaints. No dysphagia, odynophagia, GERD, NV, or abd pain. Regular BMs.     Diet: compliant with bariatric meal plan, tolerating bariatric soft diet  Exercise: regular exercise, walking   Vitamins: compliant with bariatric supplementation per protocol  Labs: reviewed 2 month lab work     Height:     Weights:     Trend Weights BMI   Starting     Pre-Op 225    2 Week     2 Month     6 Month      1 Year 176 34.53   2 Year       Estimated body mass index is 34.53 kg/m² as calculated from the following:    Height as of this encounter: 5' (1.524 m).    Weight as of this encounter: 80.2 kg (176 lb 12.8 oz).          Pertinent History:  HTN - [] Yes   [x] No    [] Resolved  HLD - [] Yes   [x] No    [] Resolved  DM  -  [] Yes   [x] No    [] Resolved  MIKEY - [] Yes   [x] No   [] Resolved  GERD - [] Yes   [x] No [] Resolved  Anticoagulation- [] Yes   [x] No  If yes, type: [] ASA [] Plavix [] Other    Patient Care Team:  Amrit Vogt II, MD as PCP - General (Internal Medicine)  Nomi Olivares MD as Consulting Physician (Gastroenterology)  Jake Elmore MD as Surgeon (Bariatrics)  Armando Jacques MD (Cardiovascular Disease)  Blanco Parnell MD (Orthopedic Surgery)  Dana Trinh (Inactive)     Review of Systems:    12 point review of systems  conducted, negative except as stated in the history of present illness. See HPI for details.    Review of patient's allergies indicates:   Allergen Reactions    Azithromycin     Levaquin [levofloxacin]     Lortab [hydrocodone-acetaminophen]     Pcn [penicillins]      Past Medical History:   Diagnosis Date    Arthritis     Benign paroxysmal vertigo, bilateral     Chronic back pain     Concussion     DVT (deep venous thrombosis)     Fibromyalgia     GERD (gastroesophageal reflux disease)     History of syncope     Hypercholesteremia     Hypertension     IBS (irritable bowel syndrome)     Lymphedema     Osteoporosis     Personal history of colonic polyps 2015    Skin cancer     SOB (shortness of breath)     Systemic lupus erythematosus, organ or system involvement unspecified     Thyroid disease     Unspecified cataract     Unspecified osteoarthritis, unspecified site      Past Surgical History:   Procedure Laterality Date    APPENDECTOMY      CARPAL TUNNEL RELEASE      CATARACT EXTRACTION Right 2022    CATARACT EXTRACTION Left 2022     SECTION      CHOLECYSTECTOMY      COLONOSCOPY  2015    EXCISION OF SQUAMOUS CELL CARCINOMA      FOOT FRACTURE SURGERY      HIATAL HERNIA REPAIR  2022    Dr. gould    HYSTERECTOMY      KNEE SURGERY      LAPAROSCOPIC SLEEVE GASTRECTOMY  2022    Dr. Gould    RECONSTRUCTION OF SHOULDER      TONSILLECTOMY      TRIGGER FINGER RELEASE      WRIST FRACTURE SURGERY       History reviewed. No pertinent family history.  Social History     Tobacco Use    Smoking status: Never    Smokeless tobacco: Never   Substance Use Topics    Alcohol use: Not Currently    Drug use: Never      Current Outpatient Medications   Medication Instructions    calcium citrate/vitamin D3 (CITRACAL + D ORAL) Oral    clonazePAM (KLONOPIN) 2 MG Tab TAKE TWO TABLETS BY MOUTH EVERY NIGHT AT BEDTIME    COLLAGEN MISC Misc.(Non-Drug; Combo Route)    ergocalciferol  (ERGOCALCIFEROL) 50,000 Units, Oral, Every 7 days    furosemide (LASIX) 20 mg, Oral, Daily PRN    meclizine (ANTIVERT) 12.5 mg, Oral, 3 times daily PRN    multivit-minerals/folic acid (CENTRUM ADULTS ORAL) Oral    pantoprazole (PROTONIX) 40 MG tablet TAKE ONE TABLET BY MOUTH DAILY    protein supplement (PROTEIN ORAL) Oral    UNABLE TO FIND Sugarbear vitamins        Objective:     Vital Signs (Most Recent):  Visit Vitals  /62   Pulse 74   Ht 5' (1.524 m)   Wt 80.2 kg (176 lb 12.8 oz)   BMI 34.53 kg/m²       Physical Exam:  General: Alert and oriented, No acute distress.  Respiratory: Clear to auscultation bilaterally; No wheezes, rales or rhonchi,Non-labored respirations  Cardiovascular: Regular rate and rhythm   Gastrointestinal: Abd soft, Non-tender, Non-distended, Bowel sounds present, Lap sites clean dry and intact, no evidence of infection.   Musculoskeletal: Normal range of motion.  Integumentary: Warm, Dry, Intact  Neurologic: No focal deficits  Psychiatric: Appropriate affect      Assessment and Plan:     No diagnosis found.    2 years post op s/p Laparoscopic Sleeve Gastrectomy.     - Follow up in 12 months with bariatric labs  - Continue diet per bariatric protocol  - Exercise encouraged  - Continue vitamin supplementation  - Support group attendance encouraged  - Keep routine appts with PCP/specialists as scheduled    Jake Elmore MD

## 2023-11-06 ENCOUNTER — TELEPHONE (OUTPATIENT)
Dept: SURGERY | Facility: CLINIC | Age: 68
End: 2023-11-06
Payer: MEDICARE

## 2023-11-07 RX ORDER — CLONAZEPAM 2 MG/1
4 TABLET ORAL NIGHTLY
Qty: 60 TABLET | Refills: 3 | Status: SHIPPED | OUTPATIENT
Start: 2023-11-07

## 2023-11-14 LAB — CRC RECOMMENDATION EXT: NORMAL

## 2023-11-21 ENCOUNTER — DOCUMENTATION ONLY (OUTPATIENT)
Dept: INTERNAL MEDICINE | Facility: CLINIC | Age: 68
End: 2023-11-21
Payer: MEDICARE

## 2023-12-27 ENCOUNTER — TELEPHONE (OUTPATIENT)
Dept: INTERNAL MEDICINE | Facility: CLINIC | Age: 68
End: 2023-12-27
Payer: MEDICARE

## 2023-12-27 DIAGNOSIS — E78.2 MIXED HYPERLIPIDEMIA: Primary | ICD-10-CM

## 2023-12-27 NOTE — TELEPHONE ENCOUNTER
----- Message from Delisa Burnett sent at 12/27/2023 12:47 PM CST -----  Regarding: advice  Type:  Needs Medical Advice    Who Called: pt    Pharmacy name and phone #:    Would the patient rather a call back or a response via [facility]? C/b  Best Call Back Number: [phone]    Additional Information: pcp had prescribed rovestatin for pt during the summer but pt had side effect    So pt stopped taking and started watching her diet pt would like to have labs retested for cholesterol. Also pt is having a lot of anxiety lately.     Please contact pt

## 2024-01-04 ENCOUNTER — TELEPHONE (OUTPATIENT)
Dept: INTERNAL MEDICINE | Facility: CLINIC | Age: 69
End: 2024-01-04
Payer: MEDICARE

## 2024-01-04 NOTE — TELEPHONE ENCOUNTER
advice  Received: Today  Cate Pendleton Staff  Caller: Unspecified (Today,  1:09 PM)  Type:  Needs Medical Advice    Who Called: pt    Best Call Back Number: [phone]  Additional Information: pt called back nurse with a negative covid test. Please advise

## 2024-01-04 NOTE — TELEPHONE ENCOUNTER
Pt was informed that she needs to do a home covid test per MD's protocol and call back with the results, voiced understanding

## 2024-01-04 NOTE — TELEPHONE ENCOUNTER
----- Message from Garrett Mauricio sent at 1/4/2024  9:37 AM CST -----  .Type:  Patient Returning Call    Who Called:pt  Who Left Message for Patient:  Does the patient know what this is regarding?:sick   Would the patient rather a call back or a response via [facility]? Call back   Best Call Back Number:[phone]  Additional Information: Pt states that she is still having a sore throat and issues. She is requesting medication

## 2024-01-11 ENCOUNTER — OFFICE VISIT (OUTPATIENT)
Dept: INTERNAL MEDICINE | Facility: CLINIC | Age: 69
End: 2024-01-11
Payer: MEDICARE

## 2024-01-11 VITALS
HEART RATE: 87 BPM | WEIGHT: 180.63 LBS | DIASTOLIC BLOOD PRESSURE: 72 MMHG | OXYGEN SATURATION: 98 % | SYSTOLIC BLOOD PRESSURE: 126 MMHG | BODY MASS INDEX: 35.27 KG/M2 | TEMPERATURE: 99 F | RESPIRATION RATE: 18 BRPM

## 2024-01-11 DIAGNOSIS — J06.9 RECENT URI: ICD-10-CM

## 2024-01-11 DIAGNOSIS — F41.9 ANXIETY AND DEPRESSION: Primary | Chronic | ICD-10-CM

## 2024-01-11 DIAGNOSIS — F32.A ANXIETY AND DEPRESSION: Primary | Chronic | ICD-10-CM

## 2024-01-11 PROCEDURE — 3074F SYST BP LT 130 MM HG: CPT | Mod: CPTII,,,

## 2024-01-11 PROCEDURE — 1125F AMNT PAIN NOTED PAIN PRSNT: CPT | Mod: CPTII,,,

## 2024-01-11 PROCEDURE — 1160F RVW MEDS BY RX/DR IN RCRD: CPT | Mod: CPTII,,,

## 2024-01-11 PROCEDURE — 1159F MED LIST DOCD IN RCRD: CPT | Mod: CPTII,,,

## 2024-01-11 PROCEDURE — 1101F PT FALLS ASSESS-DOCD LE1/YR: CPT | Mod: CPTII,,,

## 2024-01-11 PROCEDURE — 3288F FALL RISK ASSESSMENT DOCD: CPT | Mod: CPTII,,,

## 2024-01-11 PROCEDURE — 3078F DIAST BP <80 MM HG: CPT | Mod: CPTII,,,

## 2024-01-11 PROCEDURE — 3008F BODY MASS INDEX DOCD: CPT | Mod: CPTII,,,

## 2024-01-11 PROCEDURE — 99214 OFFICE O/P EST MOD 30 MIN: CPT | Mod: ,,,

## 2024-01-11 RX ORDER — DICYCLOMINE HYDROCHLORIDE 10 MG/1
10 CAPSULE ORAL 3 TIMES DAILY
COMMUNITY
Start: 2023-12-20

## 2024-01-11 RX ORDER — FLUOXETINE HYDROCHLORIDE 20 MG/1
20 CAPSULE ORAL NIGHTLY
Qty: 30 CAPSULE | Refills: 1 | Status: SHIPPED | OUTPATIENT
Start: 2024-01-11 | End: 2024-03-11

## 2024-01-11 RX ORDER — IRBESARTAN 75 MG/1
75 TABLET ORAL
COMMUNITY
Start: 2023-12-20

## 2024-01-11 NOTE — ASSESSMENT & PLAN NOTE
-approximately 10 days ago   -symptoms gradually improving and currently mild presentation  -encouraged to utilize OTC antihistamine of  -Tylenol/ibuprofen for low-grade temps and body aches   -OTC cough syrup as indicated   -notify clinic for new or worsening symptoms

## 2024-01-11 NOTE — ASSESSMENT & PLAN NOTE
-acute on chronic  -associated with loss father and caretaker role strain  -currently on Klonopin 4 mg nightly, continue  -initiate trial on Prozac 20 mg daily trial   -notify clinic for new or worsening symptoms   -allow 4-6 weeks for medication take effect   -do not abruptly stop medication

## 2024-01-29 ENCOUNTER — TELEPHONE (OUTPATIENT)
Dept: INTERNAL MEDICINE | Facility: CLINIC | Age: 69
End: 2024-01-29
Payer: MEDICARE

## 2024-01-29 NOTE — TELEPHONE ENCOUNTER
----- Message from Melida Livingston sent at 1/29/2024  8:35 AM CST -----  Regarding: call  .Type:  Needs Medical Advice    Who Called: pt  Symptoms (please be specific):  How long has patient had these symptoms:  1  Pharmacy name and phone #:    Would the patient rather a call back or a response via [facility]?   Best Call Back Number:  [phone]  Additional Information: request MRI for Spasms of the bladder

## 2024-03-06 DIAGNOSIS — R10.9 STOMACH CRAMPS: Primary | ICD-10-CM

## 2024-03-06 RX ORDER — DICYCLOMINE HYDROCHLORIDE 10 MG/1
10 CAPSULE ORAL 3 TIMES DAILY
Qty: 30 CAPSULE | Refills: 0 | Status: SHIPPED | OUTPATIENT
Start: 2024-03-06

## 2024-03-06 NOTE — TELEPHONE ENCOUNTER
----- Message from Radha Caldwell sent at 3/6/2024  4:23 PM CST -----  .Type:  RX Refill Request    Who Called: Varsha Mercer or New Rx:Refill  RX Name and Strength:Christine 2mg  How is the patient currently taking it? (ex. 1XDay):As needed  Is this a 30 day or 90 day RX:30  Preferred Pharmacy with phone number:[facility]  Local or Mail Order:Local  Ordering Provider:Sin  Would the patient rather a call back or a response via [facility]?   Best Call Back Number:[phone]  Additional Information: Christine 2mg

## 2024-03-06 NOTE — TELEPHONE ENCOUNTER
Pt is requesting a script for Donnatal. States she has a stomach ache and she took this before. Please advise

## 2024-03-12 ENCOUNTER — TELEPHONE (OUTPATIENT)
Dept: INTERNAL MEDICINE | Facility: CLINIC | Age: 69
End: 2024-03-12
Payer: MEDICARE

## 2024-03-12 NOTE — TELEPHONE ENCOUNTER
----- Message from Yessenia Bernal sent at 3/12/2024 12:20 PM CDT -----  .Type:  Needs Medical Advice    Who Called: pt  Symptoms (please be specific):    How long has patient had these symptoms:    Pharmacy name and phone #:    Would the patient rather a call back or a response via [facility]?   Best Call Back Number: [phone]  Additional Information: pt would like cholesterol added to her lab orders

## 2024-03-18 ENCOUNTER — TELEPHONE (OUTPATIENT)
Dept: INTERNAL MEDICINE | Facility: CLINIC | Age: 69
End: 2024-03-18
Payer: MEDICARE

## 2024-03-18 NOTE — TELEPHONE ENCOUNTER
----- Message from Manuel Barr LPN sent at 3/15/2024  9:35 AM CDT -----  Regarding: kwame tom 3/25 @1:40  Are there any outstanding tasks in patient chart? Needs fasting labs    Is there documentation of outstanding tasks in patient chart? no    Has patient been to the ER, urgent care, or another physician since last visit?    Has patient done any blood work or x-rays since last visit?    5. PLEASE HAVE PATIENT BRING MEDICATION LIST OR BOTTLES TO EVERY OFFICE VISIT

## 2024-03-21 ENCOUNTER — LAB VISIT (OUTPATIENT)
Dept: LAB | Facility: HOSPITAL | Age: 69
End: 2024-03-21
Attending: INTERNAL MEDICINE
Payer: MEDICARE

## 2024-03-21 DIAGNOSIS — E78.2 MIXED HYPERLIPIDEMIA: ICD-10-CM

## 2024-03-21 LAB
ALBUMIN SERPL-MCNC: 3.4 G/DL (ref 3.4–4.8)
ALBUMIN/GLOB SERPL: 0.8 RATIO (ref 1.1–2)
ALP SERPL-CCNC: 100 UNIT/L (ref 40–150)
ALT SERPL-CCNC: 17 UNIT/L (ref 0–55)
AST SERPL-CCNC: 17 UNIT/L (ref 5–34)
BILIRUB SERPL-MCNC: 0.4 MG/DL
BUN SERPL-MCNC: 21.5 MG/DL (ref 9.8–20.1)
CALCIUM SERPL-MCNC: 9.3 MG/DL (ref 8.4–10.2)
CHLORIDE SERPL-SCNC: 105 MMOL/L (ref 98–107)
CHOLEST SERPL-MCNC: 233 MG/DL
CHOLEST/HDLC SERPL: 5 {RATIO} (ref 0–5)
CO2 SERPL-SCNC: 28 MMOL/L (ref 23–31)
CREAT SERPL-MCNC: 0.75 MG/DL (ref 0.55–1.02)
GFR SERPLBLD CREATININE-BSD FMLA CKD-EPI: >60 MLS/MIN/1.73/M2
GLOBULIN SER-MCNC: 4.3 GM/DL (ref 2.4–3.5)
GLUCOSE SERPL-MCNC: 90 MG/DL (ref 82–115)
HDLC SERPL-MCNC: 51 MG/DL (ref 35–60)
LDLC SERPL CALC-MCNC: 153 MG/DL (ref 50–140)
POTASSIUM SERPL-SCNC: 3.7 MMOL/L (ref 3.5–5.1)
PROT SERPL-MCNC: 7.7 GM/DL (ref 5.8–7.6)
SODIUM SERPL-SCNC: 142 MMOL/L (ref 136–145)
TRIGL SERPL-MCNC: 143 MG/DL (ref 37–140)
VLDLC SERPL CALC-MCNC: 29 MG/DL

## 2024-03-21 PROCEDURE — 80061 LIPID PANEL: CPT

## 2024-03-21 PROCEDURE — 36415 COLL VENOUS BLD VENIPUNCTURE: CPT

## 2024-03-21 PROCEDURE — 80053 COMPREHEN METABOLIC PANEL: CPT

## 2024-03-25 ENCOUNTER — OFFICE VISIT (OUTPATIENT)
Dept: INTERNAL MEDICINE | Facility: CLINIC | Age: 69
End: 2024-03-25
Payer: MEDICARE

## 2024-03-25 VITALS
HEIGHT: 60 IN | HEART RATE: 59 BPM | RESPIRATION RATE: 18 BRPM | DIASTOLIC BLOOD PRESSURE: 76 MMHG | SYSTOLIC BLOOD PRESSURE: 122 MMHG | BODY MASS INDEX: 36.52 KG/M2 | WEIGHT: 186 LBS

## 2024-03-25 DIAGNOSIS — M79.10 MYALGIA DUE TO STATIN: ICD-10-CM

## 2024-03-25 DIAGNOSIS — E78.2 MIXED HYPERLIPIDEMIA: ICD-10-CM

## 2024-03-25 DIAGNOSIS — K21.9 GASTROESOPHAGEAL REFLUX DISEASE WITHOUT ESOPHAGITIS: ICD-10-CM

## 2024-03-25 DIAGNOSIS — I82.4Y9 DEEP VEIN THROMBOSIS (DVT) OF PROXIMAL LOWER EXTREMITY, UNSPECIFIED CHRONICITY, UNSPECIFIED LATERALITY: ICD-10-CM

## 2024-03-25 DIAGNOSIS — E11.9 TYPE 2 DIABETES MELLITUS WITHOUT COMPLICATION, WITHOUT LONG-TERM CURRENT USE OF INSULIN: ICD-10-CM

## 2024-03-25 DIAGNOSIS — T46.6X5A MYALGIA DUE TO STATIN: ICD-10-CM

## 2024-03-25 DIAGNOSIS — M32.9 SYSTEMIC LUPUS ERYTHEMATOSUS, UNSPECIFIED SLE TYPE, UNSPECIFIED ORGAN INVOLVEMENT STATUS: Primary | ICD-10-CM

## 2024-03-25 DIAGNOSIS — E66.01 MORBID OBESITY: ICD-10-CM

## 2024-03-25 PROCEDURE — 4010F ACE/ARB THERAPY RXD/TAKEN: CPT | Mod: CPTII,,, | Performed by: INTERNAL MEDICINE

## 2024-03-25 PROCEDURE — 3074F SYST BP LT 130 MM HG: CPT | Mod: CPTII,,, | Performed by: INTERNAL MEDICINE

## 2024-03-25 PROCEDURE — 1159F MED LIST DOCD IN RCRD: CPT | Mod: CPTII,,, | Performed by: INTERNAL MEDICINE

## 2024-03-25 PROCEDURE — 1160F RVW MEDS BY RX/DR IN RCRD: CPT | Mod: CPTII,,, | Performed by: INTERNAL MEDICINE

## 2024-03-25 PROCEDURE — 3288F FALL RISK ASSESSMENT DOCD: CPT | Mod: CPTII,,, | Performed by: INTERNAL MEDICINE

## 2024-03-25 PROCEDURE — 3008F BODY MASS INDEX DOCD: CPT | Mod: CPTII,,, | Performed by: INTERNAL MEDICINE

## 2024-03-25 PROCEDURE — 3078F DIAST BP <80 MM HG: CPT | Mod: CPTII,,, | Performed by: INTERNAL MEDICINE

## 2024-03-25 PROCEDURE — 1101F PT FALLS ASSESS-DOCD LE1/YR: CPT | Mod: CPTII,,, | Performed by: INTERNAL MEDICINE

## 2024-03-25 PROCEDURE — 99214 OFFICE O/P EST MOD 30 MIN: CPT | Mod: ,,, | Performed by: INTERNAL MEDICINE

## 2024-03-25 NOTE — PROGRESS NOTES
Patient ID: Varsha Rg is a 69 y.o. female.    Chief Complaint: Follow-up (6 month f/u, labs 3/21)      HPI:   Patient presents here today for above reason.     Ms. Nazario is a 69-year-old female presents today in follow-up.  We placed her on Crestor last visit however she could not tolerate the medication.  She was since been off she has been following a good diet and exercise program.  Otherwise here for follow-up regarding the cholesterol.  Her LDL is still 153    The patient's Health Maintenance was reviewed and the following appears to be due at this time:   Health Maintenance Due   Topic Date Due    Hepatitis C Screening  Never done    TETANUS VACCINE  Never done    Shingles Vaccine (1 of 2) Never done    RSV Vaccine (Age 60+ and Pregnant patients) (1 - 1-dose 60+ series) Never done    Pneumococcal Vaccines (Age 65+) (1 of 1 - PCV) Never done    COVID-19 Vaccine (2023- season) 2023    Mammogram  2024        Past Medical History:  Past Medical History:   Diagnosis Date    Anxiety and depression 2024    Arthritis     Benign paroxysmal vertigo, bilateral     Chronic back pain     Concussion     DVT (deep venous thrombosis)     Fibromyalgia     GERD (gastroesophageal reflux disease)     History of syncope     Hypercholesteremia     Hypertension     IBS (irritable bowel syndrome)     Lymphedema     Osteoporosis     Personal history of colonic polyps 2015    Skin cancer     SOB (shortness of breath)     Systemic lupus erythematosus, organ or system involvement unspecified     Thyroid disease     Unspecified cataract     Unspecified osteoarthritis, unspecified site      Past Surgical History:   Procedure Laterality Date    APPENDECTOMY      CARPAL TUNNEL RELEASE      CATARACT EXTRACTION Right 2022    CATARACT EXTRACTION Left 2022     SECTION      CHOLECYSTECTOMY      COLONOSCOPY  2015    EXCISION OF SQUAMOUS CELL CARCINOMA      FOOT FRACTURE SURGERY       HIATAL HERNIA REPAIR  01/24/2022    Dr. gould    HYSTERECTOMY      KNEE SURGERY      LAPAROSCOPIC SLEEVE GASTRECTOMY  01/24/2022    Dr. Gould    RECONSTRUCTION OF SHOULDER      TONSILLECTOMY      TRIGGER FINGER RELEASE      WRIST FRACTURE SURGERY       Review of patient's allergies indicates:   Allergen Reactions    Azithromycin Shortness Of Breath    Hydrocodone-acetaminophen Hallucinations    Pcn [penicillins]     Rosuvastatin     Levofloxacin Palpitations     Current Outpatient Medications on File Prior to Visit   Medication Sig Dispense Refill    calcium citrate/vitamin D3 (CITRACAL + D ORAL) Take by mouth.      clonazePAM (KLONOPIN) 2 MG Tab TAKE TWO TABLETS BY MOUTH AT BEDTIME 60 tablet 3    COLLAGEN MISC by Misc.(Non-Drug; Combo Route) route.      dicyclomine (BENTYL) 10 MG capsule Take 1 capsule (10 mg total) by mouth 3 (three) times daily. 30 capsule 0    FLUoxetine 20 MG capsule Take 1 capsule (20 mg total) by mouth nightly. 30 capsule 1    furosemide (LASIX) 20 MG tablet Take 20 mg by mouth daily as needed.      irbesartan (AVAPRO) 75 MG tablet Take 75 mg by mouth. 1/2 tablet daily      meclizine (ANTIVERT) 12.5 mg tablet Take 12.5 mg by mouth 3 (three) times daily as needed.      multivit-minerals/folic acid (CENTRUM ADULTS ORAL) Take by mouth.      pantoprazole (PROTONIX) 40 MG tablet TAKE ONE TABLET BY MOUTH DAILY 30 tablet 6    protein supplement (PROTEIN ORAL) Take by mouth.      UNABLE TO FIND Sugarbear vitamins      [DISCONTINUED] ergocalciferol (ERGOCALCIFEROL) 50,000 unit Cap Take 1 capsule (50,000 Units total) by mouth every 7 days. 12 capsule 3     No current facility-administered medications on file prior to visit.     Social History     Socioeconomic History    Marital status:    Tobacco Use    Smoking status: Never    Smokeless tobacco: Never   Substance and Sexual Activity    Alcohol use: Not Currently    Drug use: Never     Social Determinants of Health     Financial  Resource Strain: Low Risk  (9/26/2022)    Overall Financial Resource Strain (CARDIA)     Difficulty of Paying Living Expenses: Not hard at all   Food Insecurity: No Food Insecurity (9/26/2022)    Hunger Vital Sign     Worried About Running Out of Food in the Last Year: Never true     Ran Out of Food in the Last Year: Never true   Transportation Needs: No Transportation Needs (9/26/2022)    PRAPARE - Transportation     Lack of Transportation (Medical): No     Lack of Transportation (Non-Medical): No   Stress: No Stress Concern Present (9/26/2022)    [employer] - Occupational Stress Questionnaire     Feeling of Stress : Not at all   Housing Stability: Low Risk  (9/26/2022)    Housing Stability Vital Sign     Unable to Pay for Housing in the Last Year: No     Number of Places Lived in the Last Year: 1     Unstable Housing in the Last Year: No     No family history on file.    ROS:   Comprehensive review of systems was performed and is negative except as noted above    Vitals/PE:   /76 (BP Location: Left arm, Patient Position: Sitting)   Pulse (!) 59   Resp 18   Ht 5' (1.524 m)   Wt 84.4 kg (186 lb)   BMI 36.33 kg/m²   Physical Exam    General: Alert and oriented, No acute distress.   Eye: Normal conjunctiva without exudate.  HENMT: Normocephalic/AT, Normal hearing, Oral mucosa is moist and pink   Neck: No goiter visualized.   Respiratory: Lungs CTAB, Respirations are non-labored, Breath sounds are equal, Symmetrical chest wall expansion.  Cardiovascular: Normal rate, Regular rhythm, No murmur, No edema.   Gastrointestinal: Non-distended.   Genitourinary: Deferred.  Musculoskeletal: Normal ROM, Normal gait, No deformities or amputations.  Integumentary: Warm, Dry, Intact. No diaphoresis, or flushing.  Neurologic: No focal deficits, Cranial Nerves II-XII are grossly intact.   Psychiatric: Cooperative, Appropriate mood & affect, Normal judgment, Non-suicidal.    Assessment/Plan:        1. Systemic lupus erythematosus, unspecified SLE type, unspecified organ involvement status  Overview:  Screening uptodate  Cont current rx.       2. Deep vein thrombosis (DVT) of proximal lower extremity, unspecified chronicity, unspecified laterality  Overview:  resolved      3. Gastroesophageal reflux disease without esophagitis  Overview:  On PPI, will be seeing GI  cpmm      4. Mixed hyperlipidemia  Overview:  Start statin, crestor 10mg intolerant to statins will need to discontinue.  Continue with good diet and exercise program.      5. Type 2 diabetes mellitus without complication, without long-term current use of insulin   Pt not diabetic  6. Morbid obesity   S/p gastric sleeve.  Cont diet exercise   7. Myalgia due to statin   Intol to statin.  Ok to hold. Diet exercise wt loss           Education and counseling done face to face regarding medical conditions and plan. Contact office if new symptoms develop. Should any symptoms ever significantly worsen seek emergency medical attention/go to ER. Follow up at least yearly for wellness or sooner PRN. Nurse to call patient with any results. The patient is receptive, expresses understanding and is agreeable to plan. All questions have been answered.    No follow-ups on file.

## 2024-04-16 RX ORDER — CLONAZEPAM 2 MG/1
4 TABLET ORAL NIGHTLY
Qty: 60 TABLET | Refills: 3 | Status: SHIPPED | OUTPATIENT
Start: 2024-04-16

## 2024-08-14 RX ORDER — CLONAZEPAM 2 MG/1
4 TABLET ORAL NIGHTLY
Qty: 60 TABLET | Refills: 3 | Status: SHIPPED | OUTPATIENT
Start: 2024-08-14

## 2024-08-30 ENCOUNTER — HOSPITAL ENCOUNTER (EMERGENCY)
Facility: HOSPITAL | Age: 69
Discharge: HOME | End: 2024-08-30
Attending: INTERNAL MEDICINE
Payer: MEDICARE

## 2024-08-30 VITALS
SYSTOLIC BLOOD PRESSURE: 178 MMHG | HEIGHT: 60 IN | DIASTOLIC BLOOD PRESSURE: 87 MMHG | TEMPERATURE: 98 F | HEART RATE: 84 BPM | BODY MASS INDEX: 36.32 KG/M2 | OXYGEN SATURATION: 98 % | RESPIRATION RATE: 20 BRPM | WEIGHT: 185 LBS

## 2024-08-30 DIAGNOSIS — R03.0 ELEVATED BLOOD PRESSURE READING: ICD-10-CM

## 2024-08-30 DIAGNOSIS — R30.0 DYSURIA: Primary | ICD-10-CM

## 2024-08-30 LAB
BACTERIA #/AREA URNS AUTO: ABNORMAL /HPF
BILIRUB UR QL STRIP.AUTO: ABNORMAL
CLARITY UR: CLEAR
COLOR UR AUTO: ABNORMAL
GLUCOSE UR QL STRIP: ABNORMAL
HGB UR QL STRIP: ABNORMAL
KETONES UR QL STRIP: ABNORMAL
LEUKOCYTE ESTERASE UR QL STRIP: ABNORMAL
NITRITE UR QL STRIP: ABNORMAL
PH UR STRIP: ABNORMAL [PH]
PROT UR QL STRIP: ABNORMAL
RBC #/AREA URNS AUTO: ABNORMAL /HPF
SP GR UR STRIP.AUTO: ABNORMAL
SQUAMOUS #/AREA URNS AUTO: ABNORMAL /HPF
UROBILINOGEN UR STRIP-ACNC: ABNORMAL
WBC #/AREA URNS AUTO: ABNORMAL /HPF

## 2024-08-30 PROCEDURE — 81003 URINALYSIS AUTO W/O SCOPE: CPT | Performed by: INTERNAL MEDICINE

## 2024-08-30 PROCEDURE — 87086 URINE CULTURE/COLONY COUNT: CPT | Performed by: INTERNAL MEDICINE

## 2024-08-30 PROCEDURE — 99283 EMERGENCY DEPT VISIT LOW MDM: CPT

## 2024-08-30 PROCEDURE — 87186 SC STD MICRODIL/AGAR DIL: CPT | Performed by: INTERNAL MEDICINE

## 2024-08-30 RX ORDER — SULFAMETHOXAZOLE AND TRIMETHOPRIM 800; 160 MG/1; MG/1
1 TABLET ORAL 2 TIMES DAILY
Qty: 14 TABLET | Refills: 0 | Status: SHIPPED | OUTPATIENT
Start: 2024-08-30 | End: 2024-09-06

## 2024-08-30 NOTE — ED PROVIDER NOTES
"     Source of History:  Patient, no limitations    Chief complaint:  Dysuria (" I have a UTI." Some discomfort with I pee and the urge to pee frequently since yesterday.")      HPI:  Varsha Rg is a 69 y.o. female presenting with Dysuria (" I have a UTI." Some discomfort with I pee and the urge to pee frequently since yesterday.")       Dysuria x 1 day, requesting Bactrim as worked well for her in past. Prescribed Omnicef recently however reports not taking. Did take some old Amoxicillin as well as Azo. No fever. No flank pain.   Urinary Tract Infection: Patient complains of dysuria, frequency, and urgency She has had symptoms for 1 day.  Patient denies fever and flank pain . Patient does not have a history of recurrent UTI.  Patient does not have a history of pyelonephritis.         Review of Systems   Constitutional symptoms:  Negative except as documented in HPI.   Skin symptoms:  Negative except as documented in HPI.   HEENT symptoms:  Negative except as documented in HPI.   Respiratory symptoms:  Negative except as documented in HPI.   Cardiovascular symptoms:  Negative except as documented in HPI.   Gastrointestinal symptoms:  Negative except as documented in HPI.    Genitourinary symptoms:  Negative except as documented in HPI.   Musculoskeletal symptoms:  Negative except as documented in HPI.   Neurologic symptoms:  Negative except as documented in HPI.   Psychiatric symptoms:  Negative except as documented in HPI.   Allergy/immunologic symptoms:  Negative except as documented in HPI.             Additional review of systems information: All other systems reviewed and otherwise negative.      Review of patient's allergies indicates:   Allergen Reactions    Azithromycin Shortness Of Breath    Hydrocodone-acetaminophen Hallucinations    Pcn [penicillins]     Rosuvastatin     Levofloxacin Palpitations       PMH:  As per HPI and below:    Past Medical History:   Diagnosis Date    Anxiety and depression " 2024    Arthritis     Benign paroxysmal vertigo, bilateral     Chronic back pain     Concussion     DVT (deep venous thrombosis)     Fibromyalgia     GERD (gastroesophageal reflux disease)     History of syncope     Hypercholesteremia     Hypertension     IBS (irritable bowel syndrome)     Lymphedema     Osteoporosis     Personal history of colonic polyps 2015    Skin cancer     SOB (shortness of breath)     Systemic lupus erythematosus, organ or system involvement unspecified     Thyroid disease     Unspecified cataract     Unspecified osteoarthritis, unspecified site        History reviewed. No pertinent family history.    Past Surgical History:   Procedure Laterality Date    APPENDECTOMY      CARPAL TUNNEL RELEASE      CATARACT EXTRACTION Right 2022    CATARACT EXTRACTION Left 2022     SECTION      CHOLECYSTECTOMY      COLONOSCOPY  2015    EXCISION OF SQUAMOUS CELL CARCINOMA      FOOT FRACTURE SURGERY      HIATAL HERNIA REPAIR  2022    Dr. gould    HYSTERECTOMY      KNEE SURGERY      LAPAROSCOPIC SLEEVE GASTRECTOMY  2022    Dr. Gould    RECONSTRUCTION OF SHOULDER      TONSILLECTOMY      TRIGGER FINGER RELEASE      WRIST FRACTURE SURGERY         Social History     Tobacco Use    Smoking status: Never    Smokeless tobacco: Never   Substance Use Topics    Alcohol use: Not Currently    Drug use: Never       Patient Active Problem List   Diagnosis    Morbid obesity    Deep vein thrombosis (DVT) of proximal lower extremity, unspecified chronicity, unspecified laterality    Systemic lupus erythematosus, unspecified SLE type, unspecified organ involvement status    Mixed hyperlipidemia    Gastroesophageal reflux disease without esophagitis    Recent URI    Anxiety and depression    Type 2 diabetes mellitus without complication, without long-term current use of insulin        Physical Exam:    BP (!) 178/87 (BP Location: Left arm)   Pulse 84   Temp 97.7 °F (36.5  °C) (Oral)   Resp 20   Ht 5' (1.524 m)   Wt 83.9 kg (185 lb)   SpO2 98%   BMI 36.13 kg/m²     Nursing note and vital signs reviewed.    General:  Alert, no acute distress.   Skin: Normal for Ethnic Origin, No cyanosis  HEENT: Normocephalic and atraumatic, Vision unchanged, Pupils symmetric, No icterus , Nasal mucosa is pink and moist  Cardiovascular:  Regular rate and rhythm, No edema  Chest Wall: No deformity, equal chest rise  Respiratory:  Lungs are clear to auscultation, respirations are non-labored.    Musculoskeletal:  No deformity, Normal perfusion to all extremities  Gastrointestinal:  Soft, Non distended  : No CVA tenderness, +suprapubic tenderness  Neurological:  Alert and oriented, normal motor observed, normal speech observed.    Psychiatric:  Cooperative, appropriate mood & affect.        Labs that have been ordered have been independently reviewed and interpreted by myself.     Old Chart Reviewed.      Initial Impression/ Differential Dx:  UTI including cystitis, pyelonephritis, trauma, rhabdomyolysis,  malignancy, clotting disorder, thrombocytopenia, kidney stone      MDM:      Reviewed Nurses Note.    Reviewed Pertinent old records.    Orders Placed This Encounter    Urine culture    Urinalysis, Reflex to Urine Culture    Urinalysis, Microscopic    sulfamethoxazole-trimethoprim 800-160mg (BACTRIM DS) 800-160 mg Tab                    Labs Reviewed   URINALYSIS, REFLEX TO URINE CULTURE - Abnormal       Result Value    Color, UA Orange (*)     Appearance, UA Clear      Specific Gravity, UA        pH, UA        Protein, UA Color may interfere with results      Glucose, UA Color may interfere with results      Ketones, UA Color may interfere with results      Blood, UA Color may interfere with results      Bilirubin, UA Color may interfere with results      Urobilinogen, UA Color may interfere with results      Nitrites, UA Color may interfere with results      Leukocyte Esterase, UA Color may  interfere with results     URINALYSIS, MICROSCOPIC - Abnormal    Bacteria, UA Few (*)     RBC, UA 3-5      WBC, UA 21-50 (*)     Squamous Epithelial Cells, UA Few (*)    CULTURE, URINE          No orders to display        Admission on 08/30/2024   Component Date Value Ref Range Status    Color, UA 08/30/2024 Orange (A)  Yellow, Light-Yellow, Dark Yellow, Anny, Straw Final    Appearance, UA 08/30/2024 Clear  Clear Final    Specific [place], UA 08/30/2024    Final    pH, UA 08/30/2024    Final    Protein, UA 08/30/2024 Color may interfere with results  Negative Final    Glucose, UA 08/30/2024 Color may interfere with results  Negative, Normal Final    Ketones, UA 08/30/2024 Color may interfere with results  Negative Final    Blood, UA 08/30/2024 Color may interfere with results  Negative Final    Bilirubin, UA 08/30/2024 Color may interfere with results  Negative Final    Urobilinogen, UA 08/30/2024 Color may interfere with results  0.2, 1.0, Normal Final    Nitrites, UA 08/30/2024 Color may interfere with results  Negative Final    Leukocyte Esterase, UA 08/30/2024 Color may interfere with results  Negative Final    Bacteria, UA 08/30/2024 Few (A)  None Seen, Rare, Occasional /HPF Final    RBC, UA 08/30/2024 3-5  None Seen, 0-2, 3-5, 0-5 /HPF Final    WBC, UA 08/30/2024 21-50 (A)  None Seen, 0-2, 3-5, 0-5 /HPF Final    Squamous Epithelial Cells, UA 08/30/2024 Few (A)  None Seen, Rare, Occasional, Occ /HPF Final       Imaging Results    None                        ED Course as of 08/30/24 0629   Fri Aug 30, 2024   0603 BP(!): 178/87 [MP]   0603 Temp: 97.7 °F (36.5 °C) [MP]   0603 Pulse: 84 [MP]   0614 Color, UA(!): Orange [MP]   0625 Bacteria, UA(!): Few [MP]      ED Course User Index  [MP] Derian Hoyos DO                        Diagnostic Impression:    1. Dysuria    2. Elevated blood pressure reading         ED Disposition Condition    Discharge Stable             Follow-up Information       [facility] - [facility] Dept.    Specialty: Emergency Medicine  Why: If symptoms worsen  Contact information:  [address]  [facility] [id number] [phone]             Call  Amrit Vogt II, MD.    Specialty: Internal Medicine  Contact information:  [address] [facility] [address]  [phone]                              ED Prescriptions       Medication Sig Dispense Start Date End Date Auth. Provider    sulfamethoxazole-trimethoprim 800-160mg (BACTRIM DS) 800-160 mg Tab Take 1 tablet by mouth 2 (two) times daily. for 7 days 14 tablet 8/30/2024 9/6/2024 Derian Hoyos DO          Follow-up Information       Follow up With Specialties Details Why Contact [facility] - Emergency Dept Emergency Medicine  If symptoms worsen [address]  [facility] [id number] [phone]    Amrit Vogt II, MD Internal Medicine Call   [address] [facility] [address]  [phone]               Derian Hoyos DO  08/30/24 0629

## 2024-09-01 LAB — BACTERIA UR CULT: ABNORMAL

## 2024-09-20 DIAGNOSIS — E78.2 MIXED HYPERLIPIDEMIA: ICD-10-CM

## 2024-09-20 DIAGNOSIS — Z00.00 WELLNESS EXAMINATION: Primary | ICD-10-CM

## 2024-09-20 DIAGNOSIS — I10 HYPERTENSION, UNSPECIFIED TYPE: ICD-10-CM

## 2024-09-20 DIAGNOSIS — R73.01 IFG (IMPAIRED FASTING GLUCOSE): ICD-10-CM

## 2024-09-20 DIAGNOSIS — E03.9 HYPOTHYROIDISM, UNSPECIFIED TYPE: ICD-10-CM

## 2024-09-20 DIAGNOSIS — K21.9 GASTROESOPHAGEAL REFLUX DISEASE, UNSPECIFIED WHETHER ESOPHAGITIS PRESENT: ICD-10-CM

## 2024-09-20 DIAGNOSIS — E11.9 TYPE 2 DIABETES MELLITUS WITHOUT COMPLICATION, WITHOUT LONG-TERM CURRENT USE OF INSULIN: ICD-10-CM

## 2024-09-23 ENCOUNTER — TELEPHONE (OUTPATIENT)
Dept: INTERNAL MEDICINE | Facility: CLINIC | Age: 69
End: 2024-09-23
Payer: MEDICARE

## 2024-09-23 NOTE — TELEPHONE ENCOUNTER
----- Message from Manuel Barr LPN sent at 9/20/2024  8:06 AM CDT -----  Regarding: kwame tom 9/30 @1  Are there any outstanding tasks in patient chart? Needs fasting labs    Is there documentation of outstanding tasks in patient chart? no    Has patient been to the ER, urgent care, or another physician since last visit?    Has patient done any blood work or x-rays since last visit?    5. PLEASE HAVE PATIENT BRING MEDICATION LIST OR BOTTLES TO EVERY OFFICE VISIT

## 2024-10-08 ENCOUNTER — TELEPHONE (OUTPATIENT)
Dept: INTERNAL MEDICINE | Facility: CLINIC | Age: 69
End: 2024-10-08
Payer: MEDICARE

## 2024-10-08 DIAGNOSIS — K21.9 GASTROESOPHAGEAL REFLUX DISEASE, UNSPECIFIED WHETHER ESOPHAGITIS PRESENT: ICD-10-CM

## 2024-10-08 RX ORDER — PANTOPRAZOLE SODIUM 40 MG/1
40 TABLET, DELAYED RELEASE ORAL DAILY
Qty: 90 TABLET | Refills: 1 | Status: SHIPPED | OUTPATIENT
Start: 2024-10-08

## 2024-10-08 NOTE — TELEPHONE ENCOUNTER
----- Message from Cate sent at 10/8/2024  9:25 AM CDT -----  Regarding: refill  Who Called: Varsha Rg    Refill or New Rx:Refill  RX Name and Strength:pantoprazole (PROTONIX) 40 MG tablet    How is the patient currently taking it? (ex. 1XDay):1xday    Is this a 30 day or 90 day RX:90      Local or Mail Order  List of preferred pharmacies on file (remove unneeded): [facility] pharmacy in [place]  If different Pharmacy is requested, enter Pharmacy information here including location and phone number:    Ordering Provider:      Preferred Method of Contact: Phone Call    Patient's Preferred Phone Number on File: [phone]     Best Call Back Number, if different:  Additional Information: stated that she is out of meds. Stated that she has been out of meds for 5 weeks. Stated that she would like a 90 day rx. Please advise

## 2024-11-14 ENCOUNTER — TELEPHONE (OUTPATIENT)
Dept: INTERNAL MEDICINE | Facility: CLINIC | Age: 69
End: 2024-11-14
Payer: MEDICARE

## 2024-11-14 NOTE — TELEPHONE ENCOUNTER
----- Message from Nurse Jackson sent at 11/14/2024  7:50 AM CST -----  Regarding: kwame tom 11/25 @3:20  Are there any outstanding tasks in patient chart? Needs fasting labs    Is there documentation of outstanding tasks in patient chart? no    Has patient been to the ER, urgent care, or another physician since last visit?    Has patient done any blood work or x-rays since last visit?    5. PLEASE HAVE PATIENT BRING MEDICATION LIST OR BOTTLES TO EVERY OFFICE VISIT

## 2024-11-20 ENCOUNTER — PATIENT MESSAGE (OUTPATIENT)
Dept: ADMINISTRATIVE | Facility: HOSPITAL | Age: 69
End: 2024-11-20
Payer: MEDICARE

## 2024-11-21 ENCOUNTER — TELEPHONE (OUTPATIENT)
Dept: INTERNAL MEDICINE | Facility: CLINIC | Age: 69
End: 2024-11-21
Payer: MEDICARE

## 2024-11-21 ENCOUNTER — LAB VISIT (OUTPATIENT)
Dept: LAB | Facility: HOSPITAL | Age: 69
End: 2024-11-21
Attending: INTERNAL MEDICINE
Payer: MEDICARE

## 2024-11-21 DIAGNOSIS — K21.9 GASTROESOPHAGEAL REFLUX DISEASE, UNSPECIFIED WHETHER ESOPHAGITIS PRESENT: ICD-10-CM

## 2024-11-21 DIAGNOSIS — E03.9 HYPOTHYROIDISM, UNSPECIFIED TYPE: ICD-10-CM

## 2024-11-21 DIAGNOSIS — E11.9 TYPE 2 DIABETES MELLITUS WITHOUT COMPLICATION, WITHOUT LONG-TERM CURRENT USE OF INSULIN: ICD-10-CM

## 2024-11-21 DIAGNOSIS — Z00.00 WELLNESS EXAMINATION: ICD-10-CM

## 2024-11-21 DIAGNOSIS — R73.01 IFG (IMPAIRED FASTING GLUCOSE): ICD-10-CM

## 2024-11-21 DIAGNOSIS — I10 HYPERTENSION, UNSPECIFIED TYPE: ICD-10-CM

## 2024-11-21 DIAGNOSIS — E78.2 MIXED HYPERLIPIDEMIA: ICD-10-CM

## 2024-11-21 LAB
ALBUMIN SERPL-MCNC: 3.5 G/DL (ref 3.4–4.8)
ALBUMIN/GLOB SERPL: 0.9 RATIO (ref 1.1–2)
ALP SERPL-CCNC: 103 UNIT/L (ref 40–150)
ALT SERPL-CCNC: 15 UNIT/L (ref 0–55)
ANION GAP SERPL CALC-SCNC: 9 MEQ/L
AST SERPL-CCNC: 19 UNIT/L (ref 5–34)
BASOPHILS # BLD AUTO: 0.08 X10(3)/MCL
BASOPHILS NFR BLD AUTO: 0.8 %
BILIRUB SERPL-MCNC: 0.5 MG/DL
BUN SERPL-MCNC: 15.6 MG/DL (ref 9.8–20.1)
CALCIUM SERPL-MCNC: 9.4 MG/DL (ref 8.4–10.2)
CHLORIDE SERPL-SCNC: 105 MMOL/L (ref 98–107)
CHOLEST SERPL-MCNC: 212 MG/DL
CHOLEST/HDLC SERPL: 4 {RATIO} (ref 0–5)
CO2 SERPL-SCNC: 28 MMOL/L (ref 23–31)
CREAT SERPL-MCNC: 0.68 MG/DL (ref 0.55–1.02)
CREAT/UREA NIT SERPL: 23
EOSINOPHIL # BLD AUTO: 0.17 X10(3)/MCL (ref 0–0.9)
EOSINOPHIL NFR BLD AUTO: 1.7 %
ERYTHROCYTE [DISTWIDTH] IN BLOOD BY AUTOMATED COUNT: 12.8 % (ref 11.5–17)
EST. AVERAGE GLUCOSE BLD GHB EST-MCNC: 91.1 MG/DL
GFR SERPLBLD CREATININE-BSD FMLA CKD-EPI: >60 ML/MIN/1.73/M2
GLOBULIN SER-MCNC: 4.1 GM/DL (ref 2.4–3.5)
GLUCOSE SERPL-MCNC: 92 MG/DL (ref 82–115)
HBA1C MFR BLD: 4.8 %
HCT VFR BLD AUTO: 42 % (ref 37–47)
HDLC SERPL-MCNC: 51 MG/DL (ref 35–60)
HGB BLD-MCNC: 13.3 G/DL (ref 12–16)
IMM GRANULOCYTES # BLD AUTO: 0.02 X10(3)/MCL (ref 0–0.04)
IMM GRANULOCYTES NFR BLD AUTO: 0.2 %
LDLC SERPL CALC-MCNC: 135 MG/DL (ref 50–140)
LYMPHOCYTES # BLD AUTO: 4 X10(3)/MCL (ref 0.6–4.6)
LYMPHOCYTES NFR BLD AUTO: 40.9 %
MCH RBC QN AUTO: 29.6 PG (ref 27–31)
MCHC RBC AUTO-ENTMCNC: 31.7 G/DL (ref 33–36)
MCV RBC AUTO: 93.3 FL (ref 80–94)
MONOCYTES # BLD AUTO: 0.64 X10(3)/MCL (ref 0.1–1.3)
MONOCYTES NFR BLD AUTO: 6.5 %
NEUTROPHILS # BLD AUTO: 4.87 X10(3)/MCL (ref 2.1–9.2)
NEUTROPHILS NFR BLD AUTO: 49.9 %
NRBC BLD AUTO-RTO: 0 %
PLATELET # BLD AUTO: 256 X10(3)/MCL (ref 130–400)
PMV BLD AUTO: 10.9 FL (ref 7.4–10.4)
POTASSIUM SERPL-SCNC: 3.6 MMOL/L (ref 3.5–5.1)
PROT SERPL-MCNC: 7.6 GM/DL (ref 5.8–7.6)
RBC # BLD AUTO: 4.5 X10(6)/MCL (ref 4.2–5.4)
SODIUM SERPL-SCNC: 142 MMOL/L (ref 136–145)
TRIGL SERPL-MCNC: 128 MG/DL (ref 37–140)
TSH SERPL-ACNC: 5.19 UIU/ML (ref 0.35–4.94)
VLDLC SERPL CALC-MCNC: 26 MG/DL
WBC # BLD AUTO: 9.78 X10(3)/MCL (ref 4.5–11.5)

## 2024-11-21 PROCEDURE — 80053 COMPREHEN METABOLIC PANEL: CPT

## 2024-11-21 PROCEDURE — 85025 COMPLETE CBC W/AUTO DIFF WBC: CPT

## 2024-11-21 PROCEDURE — 84443 ASSAY THYROID STIM HORMONE: CPT

## 2024-11-21 PROCEDURE — 36415 COLL VENOUS BLD VENIPUNCTURE: CPT

## 2024-11-21 PROCEDURE — 80061 LIPID PANEL: CPT

## 2024-11-21 PROCEDURE — 83036 HEMOGLOBIN GLYCOSYLATED A1C: CPT

## 2024-11-21 NOTE — TELEPHONE ENCOUNTER
Pt called requesting her lab results. States she is concerned because her globulin is elevated. Please advise

## 2024-11-21 NOTE — TELEPHONE ENCOUNTER
----- Message from Pham sent at 11/21/2024 12:03 PM CST -----  Regarding: med advice  Who Called: Varsha Rg    Caller is requesting assistance/information from provider's office.    Symptoms (please be specific):    How long has patient had these symptoms:    List of preferred pharmacies on file (remove unneeded): [unfilled]  If different, enter pharmacy into here including location and phone number:       Preferred Method of Contact: Phone Call  Patient's Preferred Phone Number on File: [phone]   Best Call Back Number, if different:  Additional Information: pt is requesting a call back

## 2024-11-22 ENCOUNTER — PATIENT OUTREACH (OUTPATIENT)
Facility: CLINIC | Age: 69
End: 2024-11-22
Payer: MEDICARE

## 2024-11-22 NOTE — LETTER
AUTHORIZATION FOR RELEASE OF CONFIDENTIAL INFORMATION      2024      Dear KIERRA,    We are seeing Varsha Rg, date of birth 1955, in the clinic at [address].  Amrit Vogt II, MD is the patient's PCP. Varsha Rg has an outstanding lab/procedure at the time we reviewed his chart.  In order to help keep her health information updated, Varsha has authorized us to request the following medical record(s):        Mammogram            Please fax any records to Amrit Vogt II, MD's at  [phone]    If you have any questions, please contact  Alex KIMBROUGH,CCC @ [phone]             Patient Name: Varsha Rg  :1955  Patient Phone #:[phone]                Varsha Rg  MRN: [id number]  : 1955  Age: 68 y.o.  Sex: female         Patient/Legal Guardian Signature  This signature was collected at 2023    Varsha Rg       _______________________________   Printed Name/Relationship to Patient      Consent for Examination and Treatment: I hereby authorize the providers and employees of [facility] ([facility]) to provide medical treatment/services which includes, but is not limited to, performing and administering tests and diagnostic procedures that are deemed necessary, including, but not limited to, imaging examinations, blood tests and other laboratory procedures as may be required by the hospital, clinic, or may be ordered by my physician(s) or persons working under the general and/or special instructions of my physician(s).      I understand and agree that this consent covers all authorized persons, including but not limited to physicians, residents, nurse practitioners, physicians' assistants, specialists, consultants, student nurses, and independently contracted physicians, who are called upon by the physician in charge, to carry out the diagnostic procedures and medical or surgical treatment.     I hereby  authorize [facility] to retain or dispose of any specimens or tissue, should there be such remaining from any test or procedure.     I hereby authorize and give consent for [facility] providers and employees to take photographs, images or videotapes of such diagnostic, surgical or treatment procedures of Patient as may be required by [facility] or as may be ordered by a physician. I further acknowledge and agree that [facility] may use cameras or other devices for patient monitoring.     I am aware that the practice of medicine is not an exact science, and I acknowledge that no guarantees have been made to me as to the outcome of any tests, procedures or treatment.     Authorization for Release of Information: I understand that my insurance company and/or their agents may need information necessary to make determinations about payment/reimbursement. I hereby provide authorization to release to all insurance companies, their successors, assignees, other parties with whom they may have contracted, or others acting on their behalf, that are involved with payment for any hospital and/or clinic charges incurred by the patient, any information that they request and deem necessary for payment/reimbursement, and/or quality review.  I further authorize the release of my health information to physicians or other health care practitioners on staff who are involved in my health care now and in the future, and to other health care providers, entities, or institutions for the purpose of my continued care and treatment, including referrals.     REGISTRATION AUTHORIZATION  Form No. 98184 (Rev. 7/13/2022)       Medicare Patient's Certification and Authorization to Release Information and Payment Request:  I certify that the information given by me in applying for payment under Title XVIII of the Social Security Act is correct. I authorize any samuel of medical or other information about me to release to the Social SecurityAdministration, or its  intermediaries or carriers, any information needed for this or a related Medicare claim. I request that payment of authorized benefits be made on my behalf.     Assignment of Insurance Benefits:   I hereby authorize any and all insurance companies, health plans, defined   benefit plans, health insurers or any entity that is or may be responsible for payment of my medical expenses to pay all hospital and medical benefits now due, and to become due and payable to me under any hospital benefits, sick benefits, injury benefits or any other benefit for services rendered to me, including Major Medical Benefits, direct to [facility] and all independently contracted physicians. I assign any and all rights that I may have against any and all insurance companies, health plans, defined benefit plans, health insurers or any entity that is or may be responsible for payment of my medical expenses, including, but not limited to any right to appeal a denial of a claim, any right to bring any action, lawsuit, administrative proceeding, or other cause of action on my behalf. I specifically assign my right to pursue litigation against any and all insurance companies, health plans, defined benefit plans, health insurers or any entity that is or may be responsible for payment of my medical expenses based upon a refusal to pay charges.            E. Valuables: It is understood and agreed that [facility] is not liable for the damage to or loss of any money, jewelry,   documents, dentures, eye glasses, hearing aids, prosthetics, or other property of value.     F. Computer Equipment: I understand and agree that should I choose to use computer equipment owned by [facility] or if I choose to access the Internet via [facility]s network, I do so at my own risk. [facility] is not responsible for any damage to my computer equipment or to any damages of any type that might arise from my loss of equipment or data.     G. Acceptance of Financial Responsibility:   I agree that in consideration of the services and   supplies that have been   or will be furnished to the patient, I am hereby obligated to pay all charges made for or on the account of the patient according to the standard rates (in effect at the time the services and supplies are delivered) established by [facility], including its Patient Financial Assistance Policy to the extent it is applicable. I understand that I am responsible for all charges, or portions thereof, not covered by insurance or other sources. Patient refunds will be distributed only after balances at all [facility] facilities are paid.     H. Communication Authorization:  I hereby authorize [facility] and its representatives, along with any billing service   or  who may work on their behalf, to contact me on   my cell phone and/or home phone using pre- recorded messages, artificial voice messages, automatic telephone dialing devices or other computer assisted technology, or by electronic      mail, text messaging, or by any other form of electronic communication. This includes, but is not limited to, appointment reminders, yearly physical exam reminders, preventive care reminders, patient campaigns, welcome calls, and calls about account balances on my account or any account on which I am listed as a guarantor. I understand I have the right to opt out of these communications at any time.      Relationship  Between  Facility and  Provider:      I understand that some, but not all, providers furnishing services to the patient are not employees or agents of [facility]. The patient is under the care and supervision of his/her attending physician, and it is the responsibility of the facility and its nursing staff to carry out the instructions of such physicians. It is the responsibility of the patient's physician/designee to obtain the patient's informed consent, when required, for medical or surgical treatment, special diagnostic or therapeutic  procedures, or hospital services rendered for the patient under the special instructions of the physician/designee.     REGISTRATION AUTHORIZATION  Form No. 62975 (Rev. 7/13/2022)      Notice of Privacy Practices: I acknowledge I have received a copy of [facility]'s Notice of Privacy Practices.     Facility  Directory: I have discussed with the organization my desire to be either included or excluded  in the facility directory in the event of my being an inpatient at an [facility] facility. I understand that if my choice is to opt-out of being identified in the facility directory that the facility will not provide any information about me such as my condition (e.g. fair, stable, etc.) or my location in the facility (e.g., room number, department).     Immunizations: [facility] shares immunization information with state sponsored health departments to help you and your doctor keep track of your immunization records. By signing, you consent to have this information shared with the health department in your state:                                [place] - LINKS ([place] Immunization Network for Kids Statewide)                                [place] - MIIX ([place] Immunization Information eXchange)                                [place] - ImmPRINT (Immunization Patient Registry with Integrated Technology)     TERM: This authorization is valid for this and subsequent care/treatment I receive at [facility] and will remain valid unless/until revoked in writing by me.     [facility]: As used in this document, [facility] means all [facility] owned and managed facilities, including, but not limited to, all health centers, surgery centers, clinics, urgent care centers, and hospitals.         [facility] complies with applicable Federal civil rights laws and does not discriminate on the basis of race, color, national origin, age, disability, or sex.  ATENCIÓN: si rae paulson a padilla disposición  servicios gratuitos de asistencia lingüística. Edgard bashir [phone].  JOANN Ý: N?u b?n nói Ti?ng Vi?t, có các d?ch v? h? tr? ngôn ng? mi?n phí dành cho b?n. G?i s? [phone].        REGISTRATION AUTHORIZATION  Form No. 76462 (Rev. 7/13/2022)

## 2024-11-22 NOTE — PROGRESS NOTES
Health Maintenance Topic(s) Outreach Outcomes & Actions Taken:    Breast Cancer Screening - Outreach Outcomes & Actions Taken  : External Records Requested & Care Team Updated if Applicable       Additional Notes:  Campaign reply: Request Mammogram [facility]

## 2024-11-25 ENCOUNTER — OFFICE VISIT (OUTPATIENT)
Dept: INTERNAL MEDICINE | Facility: CLINIC | Age: 69
End: 2024-11-25
Payer: MEDICARE

## 2024-11-25 VITALS
HEART RATE: 82 BPM | DIASTOLIC BLOOD PRESSURE: 72 MMHG | WEIGHT: 176.38 LBS | BODY MASS INDEX: 34.63 KG/M2 | RESPIRATION RATE: 18 BRPM | HEIGHT: 60 IN | OXYGEN SATURATION: 98 % | SYSTOLIC BLOOD PRESSURE: 122 MMHG

## 2024-11-25 DIAGNOSIS — K21.9 GASTROESOPHAGEAL REFLUX DISEASE WITHOUT ESOPHAGITIS: ICD-10-CM

## 2024-11-25 DIAGNOSIS — M32.9 SYSTEMIC LUPUS ERYTHEMATOSUS, UNSPECIFIED SLE TYPE, UNSPECIFIED ORGAN INVOLVEMENT STATUS: ICD-10-CM

## 2024-11-25 DIAGNOSIS — I82.4Y9 DEEP VEIN THROMBOSIS (DVT) OF PROXIMAL LOWER EXTREMITY, UNSPECIFIED CHRONICITY, UNSPECIFIED LATERALITY: ICD-10-CM

## 2024-11-25 DIAGNOSIS — E03.8 SUBCLINICAL HYPOTHYROIDISM: ICD-10-CM

## 2024-11-25 DIAGNOSIS — Z00.00 ANNUAL PHYSICAL EXAM: ICD-10-CM

## 2024-11-25 DIAGNOSIS — E11.9 TYPE 2 DIABETES MELLITUS WITHOUT COMPLICATION, WITHOUT LONG-TERM CURRENT USE OF INSULIN: ICD-10-CM

## 2024-11-25 DIAGNOSIS — E78.2 MIXED HYPERLIPIDEMIA: Primary | ICD-10-CM

## 2024-11-25 PROCEDURE — 3078F DIAST BP <80 MM HG: CPT | Mod: CPTII,,, | Performed by: INTERNAL MEDICINE

## 2024-11-25 PROCEDURE — G0439 PPPS, SUBSEQ VISIT: HCPCS | Mod: ,,, | Performed by: INTERNAL MEDICINE

## 2024-11-25 PROCEDURE — 3288F FALL RISK ASSESSMENT DOCD: CPT | Mod: CPTII,,, | Performed by: INTERNAL MEDICINE

## 2024-11-25 PROCEDURE — 4010F ACE/ARB THERAPY RXD/TAKEN: CPT | Mod: CPTII,,, | Performed by: INTERNAL MEDICINE

## 2024-11-25 PROCEDURE — 3044F HG A1C LEVEL LT 7.0%: CPT | Mod: CPTII,,, | Performed by: INTERNAL MEDICINE

## 2024-11-25 PROCEDURE — 1160F RVW MEDS BY RX/DR IN RCRD: CPT | Mod: CPTII,,, | Performed by: INTERNAL MEDICINE

## 2024-11-25 PROCEDURE — 3074F SYST BP LT 130 MM HG: CPT | Mod: CPTII,,, | Performed by: INTERNAL MEDICINE

## 2024-11-25 PROCEDURE — 1159F MED LIST DOCD IN RCRD: CPT | Mod: CPTII,,, | Performed by: INTERNAL MEDICINE

## 2024-11-25 PROCEDURE — 1101F PT FALLS ASSESS-DOCD LE1/YR: CPT | Mod: CPTII,,, | Performed by: INTERNAL MEDICINE

## 2024-11-25 NOTE — PROGRESS NOTES
Patient ID: Varsha Rg is a 69 y.o. female.    Chief Complaint: Medicare AWV (Labs done 11/21)      Patient Care Team:  Amrit Vogt II, MD as PCP - General (Internal Medicine)  Nomi Olivares MD as Consulting Physician (Gastroenterology)  Jake Elmore MD as Surgeon (Bariatrics)  Armando Jacques MD (Cardiovascular Disease)  Blanco Parnell MD (Orthopedic Surgery)  Dana Trinh (Inactive)     HPI:   Patient presents here today for above reason.     Mansi is a 69-year-old female presents today annual visit have blood work done here for review has a very mild hypo thyroid state but is asymptomatic and subclinical.  She was on 75 mcg of Synthroid in the past in his refusing any thyroid medication at this time.  Otherwise labs look great she was taking half of her Avapro in her blood pressures are normotensive did see the cardiologist recently.  Otherwise rest of her age-appropriate screening is up-to-date here for follow-up    The patient's Health Maintenance was reviewed and the following appears to be due at this time:   Health Maintenance Due   Topic Date Due    Hepatitis C Screening  Never done    TETANUS VACCINE  Never done    Shingles Vaccine (1 of 2) Never done    RSV Vaccine (Age 60+ and Pregnant patients) (1 - Risk 60-74 years 1-dose series) Never done    Pneumococcal Vaccines (Age 65+) (1 of 1 - PCV) Never done    Mammogram  04/06/2024    Influenza Vaccine (1) 09/01/2024    COVID-19 Vaccine (6 - 2024-25 season) 09/01/2024    Diabetes Urine Screening  Never done    Eye Exam  Never done        Past Medical History:  Past Medical History:   Diagnosis Date    Anxiety and depression 01/11/2024    Arthritis     Benign paroxysmal vertigo, bilateral     Chronic back pain     Concussion     DVT (deep venous thrombosis)     Fibromyalgia     GERD (gastroesophageal reflux disease)     History of syncope     Hypercholesteremia     Hypertension     IBS (irritable bowel syndrome)     Lymphedema      Osteoporosis     Personal history of colonic polyps 2015    Skin cancer     SOB (shortness of breath)     Systemic lupus erythematosus, organ or system involvement unspecified     Thyroid disease     Unspecified cataract     Unspecified osteoarthritis, unspecified site      Past Surgical History:   Procedure Laterality Date    APPENDECTOMY      CARPAL TUNNEL RELEASE      CATARACT EXTRACTION Right 2022    CATARACT EXTRACTION Left 2022     SECTION      CHOLECYSTECTOMY      COLONOSCOPY  2015    EXCISION OF SQUAMOUS CELL CARCINOMA      FOOT FRACTURE SURGERY      HIATAL HERNIA REPAIR  2022    Dr. gould    HYSTERECTOMY      KNEE SURGERY      LAPAROSCOPIC SLEEVE GASTRECTOMY  2022    Dr. Gould    RECONSTRUCTION OF SHOULDER      TONSILLECTOMY      TRIGGER FINGER RELEASE      WRIST FRACTURE SURGERY       Review of patient's allergies indicates:   Allergen Reactions    Azithromycin Shortness Of Breath    Hydrocodone-acetaminophen Hallucinations    Pcn [penicillins]     Rosuvastatin     Levofloxacin Palpitations     Current Outpatient Medications on File Prior to Visit   Medication Sig Dispense Refill    clonazePAM (KLONOPIN) 2 MG Tab TAKE TWO TABLETS BY MOUTH AT BEDTIME 60 tablet 3    COLLAGEN MISC by Misc.(Non-Drug; Combo Route) route.      furosemide (LASIX) 20 MG tablet Take 20 mg by mouth daily as needed.      irbesartan (AVAPRO) 75 MG tablet Take 75 mg by mouth. 1/2 tablet daily      meclizine (ANTIVERT) 12.5 mg tablet Take 12.5 mg by mouth 3 (three) times daily as needed.      multivit-minerals/folic acid (CENTRUM ADULTS ORAL) Take by mouth.      pantoprazole (PROTONIX) 40 MG tablet Take 1 tablet (40 mg total) by mouth once daily. 90 tablet 1    protein supplement (PROTEIN ORAL) Take by mouth.      UNABLE TO FIND Sugarbear vitamins      calcium citrate/vitamin D3 (CITRACAL + D ORAL) Take by mouth.      dicyclomine (BENTYL) 10 MG capsule Take 1 capsule (10 mg total)  by mouth 3 (three) times daily. 30 capsule 0    FLUoxetine 20 MG capsule Take 1 capsule (20 mg total) by mouth nightly. 30 capsule 1     No current facility-administered medications on file prior to visit.     Social History     Socioeconomic History    Marital status:    Tobacco Use    Smoking status: Never    Smokeless tobacco: Never   Substance and Sexual Activity    Alcohol use: Not Currently    Drug use: Never     Social Drivers of Health     Financial Resource Strain: Low Risk  (11/18/2024)    Overall Financial Resource Strain (CARDIA)     Difficulty of Paying Living Expenses: Not very hard   Food Insecurity: Food Insecurity Present (11/18/2024)    Hunger Vital Sign     Worried About Running Out of Food in the Last Year: Patient declined     Ran Out of Food in the Last Year: Sometimes true   Transportation Needs: No Transportation Needs (9/26/2022)    PRAPARE - Transportation     Lack of Transportation (Medical): No     Lack of Transportation (Non-Medical): No   Physical Activity: Sufficiently Active (11/18/2024)    Exercise Vital Sign     Days of Exercise per Week: 4 days     Minutes of Exercise per Session: 40 min   Stress: No Stress Concern Present (11/18/2024)    [place] [employer] - Occupational Stress Questionnaire     Feeling of Stress : Only a little   Housing Stability: Low Risk  (9/26/2022)    Housing Stability Vital Sign     Unable to Pay for Housing in the Last Year: No     Number of Places Lived in the Last Year: 1     Unstable Housing in the Last Year: No     No family history on file.    ROS:   Review of Systems  Constitutional: No weight gain, No fever, No chills, No fatigue.   Eyes: No blurring, No visual disturbances.   Ear/Nose/Mouth/Throat: No decreased hearing, No ear pain, No nasal congestion, No sore throat.   Respiratory: No shortness of breath, No cough, No wheezing.   Cardiovascular: No chest pain, No palpitations, No peripheral edema.   Gastrointestinal:  No nausea, No vomiting, No diarrhea, No constipation, No abdominal pain.   Genitourinary: No dysuria, No hematuria.   Hematology/Lymphatics: No bruising tendency, No bleeding tendency, No swollen lymph glands.   Endocrine: No excessive thirst, No polyuria, No excessive hunger.   Musculoskeletal: No joint pain, No muscle pain, No decreased range of motion.   Integumentary: No rash, No pruritus.   Neurologic: No abnormal balance, No confusion, No headache.   Psychiatric: No anxiety, No depression, Not suicidal, No hallucinations.        A comprehensive HEALTH RISK ASSESSMENT was completed today. Results are summarized below:    There are NO EMOTIONAL/SOCIAL CONCERNS identified on today's screening for Social Isolation, Depression and Anxiety.    There are NO COGNITIVE FUNCTION CONCERNS identified on today's screening.  There are NO FUNCTIONAL OR SAFETY CONCERNS were identified on today's screening for Physical Symptoms, Nutritional, Cognitive Function, Home Safety/Living Situation, Fall Risk, Activities of Daily Living, Independent Activities of Daily Living, Physical Activity, Timed Up and Go test and Whisper test.   The patient reports NO OPIOID PRESCRIPTIONS. This was confirmed through medication reconciliation and the [facility] website.    The patient is NOT A TOBACCO USER.  The patient reports NO SIGNIFICANT ALCOHOL USE.     All Questions regarding food, transportation or housing were not answered today.     Vitals/PE:   /72 (BP Location: Left arm, Patient Position: Sitting)   Pulse 82   Resp 18   Ht 5' (1.524 m)   Wt 80 kg (176 lb 6.4 oz)   SpO2 98%   BMI 34.45 kg/m²   Physical Exam    General: Alert and oriented, No acute distress.   Eye: Normal conjunctiva without exudate.  HENMT: Normocephalic/AT, Normal hearing, Oral mucosa is moist and pink   Neck: No goiter visualized.   Respiratory: Lungs CTAB, Respirations are non-labored, Breath sounds are equal, Symmetrical chest wall expansion.  Cardiovascular:  Normal rate, Regular rhythm, No murmur, No edema.   Gastrointestinal: Non-distended.   Genitourinary: Deferred.  Musculoskeletal: Normal ROM, Normal gait, No deformities or amputations.  Integumentary: Warm, Dry, Intact. No diaphoresis, or flushing.  Neurologic: No focal deficits, Cranial Nerves II-XII are grossly intact.   Psychiatric: Cooperative, Appropriate mood & affect, Normal judgment, Non-suicidal.             No data to display                  11/25/2024     3:20 PM 3/25/2024     1:40 PM 1/11/2024     2:20 PM 10/26/2023     2:30 PM 9/25/2023     2:00 PM 3/27/2023     3:00 PM 9/26/2022     2:40 PM   Fall Risk Assessment - Outpatient   Mobility Status Ambulatory Ambulatory Ambulatory Ambulatory Ambulatory Ambulatory Ambulatory   Number of falls 0 0 0 0 0 0 0   Identified as fall risk False False False False False False False                Assessment/Plan:       1. Mixed hyperlipidemia  Overview:  Start statin, crestor 10mg       2. Systemic lupus erythematosus, unspecified SLE type, unspecified organ involvement status  Overview:  Screening uptodate  Cont current rx.       3. Deep vein thrombosis (DVT) of proximal lower extremity, unspecified chronicity, unspecified laterality  Overview:  resolved      4. Type 2 diabetes mellitus without complication, without long-term current use of insulin    No DM  5. Gastroesophageal reflux disease without esophagitis  Overview:  On PPI, will be seeing GI  cpmm      6. Annual physical exam   Screening uptodate  7. Subclinical hypothyroidism   Repeat tsh,, free t4, pt refuses rx.            Education and counseling done face to face regarding medical conditions and plan. Contact office if new symptoms develop. Should any symptoms ever significantly worsen seek emergency medical attention/go to ER. Follow up at least yearly for wellness or sooner PRN. Nurse to call patient with any results. The patient is receptive, expresses understanding and is agreeable to plan. All  questions have been answered.    No follow-ups on file.      Medicare Annual Wellness and Personalized Prevention Plan:   Fall Risk + Home Safety + Hearing Impairment + Depression Screen + Cognitive Impairment Screen + Health Risk Assessment all reviewed.    Advance Care Planning   I attest to discussing Advance Care Planning with patient and/or family member.  Education was provided including the importance of the Health Care Power of , Advance Directives, and/or [place] documentation.  The patient expressed understanding to the importance of this information and discussion.  Length of ACP conversation in minutes:

## 2024-12-16 DIAGNOSIS — F41.1 GENERALIZED ANXIETY DISORDER: ICD-10-CM

## 2024-12-16 RX ORDER — CLONAZEPAM 2 MG/1
4 TABLET ORAL NIGHTLY
Qty: 60 TABLET | Refills: 3 | Status: SHIPPED | OUTPATIENT
Start: 2024-12-16

## 2024-12-26 ENCOUNTER — TELEPHONE (OUTPATIENT)
Dept: SURGERY | Facility: CLINIC | Age: 69
End: 2024-12-26

## 2024-12-26 DIAGNOSIS — Z91.89 ELECTROLYTE IMBALANCE RISK: ICD-10-CM

## 2024-12-26 DIAGNOSIS — Z13.0 SCREENING FOR IRON DEFICIENCY ANEMIA: ICD-10-CM

## 2024-12-26 DIAGNOSIS — Z13.21 ENCOUNTER FOR VITAMIN DEFICIENCY SCREENING: Primary | ICD-10-CM

## 2024-12-26 DIAGNOSIS — E11.9 TYPE 2 DIABETES MELLITUS WITHOUT COMPLICATION, UNSPECIFIED WHETHER LONG TERM INSULIN USE: ICD-10-CM

## 2024-12-30 ENCOUNTER — OFFICE VISIT (OUTPATIENT)
Dept: INTERNAL MEDICINE | Facility: CLINIC | Age: 69
End: 2024-12-30
Payer: MEDICARE

## 2024-12-30 ENCOUNTER — TELEPHONE (OUTPATIENT)
Dept: INTERNAL MEDICINE | Facility: CLINIC | Age: 69
End: 2024-12-30
Payer: MEDICARE

## 2024-12-30 VITALS
DIASTOLIC BLOOD PRESSURE: 76 MMHG | WEIGHT: 176 LBS | OXYGEN SATURATION: 98 % | TEMPERATURE: 99 F | BODY MASS INDEX: 34.55 KG/M2 | HEIGHT: 60 IN | RESPIRATION RATE: 18 BRPM | HEART RATE: 90 BPM | SYSTOLIC BLOOD PRESSURE: 118 MMHG

## 2024-12-30 DIAGNOSIS — J02.9 SORE THROAT: Primary | ICD-10-CM

## 2024-12-30 DIAGNOSIS — R50.9 FEVER, UNSPECIFIED FEVER CAUSE: ICD-10-CM

## 2024-12-30 DIAGNOSIS — R68.83 CHILLS: ICD-10-CM

## 2024-12-30 LAB
CTP QC/QA: YES
CTP QC/QA: YES
POC MOLECULAR INFLUENZA A AGN: NEGATIVE
POC MOLECULAR INFLUENZA B AGN: NEGATIVE
S PYO RRNA THROAT QL PROBE: NEGATIVE

## 2024-12-30 PROCEDURE — 3074F SYST BP LT 130 MM HG: CPT | Mod: CPTII,,, | Performed by: INTERNAL MEDICINE

## 2024-12-30 PROCEDURE — 4010F ACE/ARB THERAPY RXD/TAKEN: CPT | Mod: CPTII,,, | Performed by: INTERNAL MEDICINE

## 2024-12-30 PROCEDURE — 87880 STREP A ASSAY W/OPTIC: CPT | Mod: QW,,, | Performed by: INTERNAL MEDICINE

## 2024-12-30 PROCEDURE — 99214 OFFICE O/P EST MOD 30 MIN: CPT | Mod: ,,, | Performed by: INTERNAL MEDICINE

## 2024-12-30 PROCEDURE — 3044F HG A1C LEVEL LT 7.0%: CPT | Mod: CPTII,,, | Performed by: INTERNAL MEDICINE

## 2024-12-30 PROCEDURE — 3008F BODY MASS INDEX DOCD: CPT | Mod: CPTII,,, | Performed by: INTERNAL MEDICINE

## 2024-12-30 PROCEDURE — 1159F MED LIST DOCD IN RCRD: CPT | Mod: CPTII,,, | Performed by: INTERNAL MEDICINE

## 2024-12-30 PROCEDURE — 87502 INFLUENZA DNA AMP PROBE: CPT | Mod: QW,,, | Performed by: INTERNAL MEDICINE

## 2024-12-30 PROCEDURE — 3078F DIAST BP <80 MM HG: CPT | Mod: CPTII,,, | Performed by: INTERNAL MEDICINE

## 2024-12-30 PROCEDURE — 1160F RVW MEDS BY RX/DR IN RCRD: CPT | Mod: CPTII,,, | Performed by: INTERNAL MEDICINE

## 2024-12-30 RX ORDER — AMOXICILLIN AND CLAVULANATE POTASSIUM 875; 125 MG/1; MG/1
1 TABLET, FILM COATED ORAL EVERY 12 HOURS
Qty: 14 TABLET | Refills: 0 | Status: SHIPPED | OUTPATIENT
Start: 2024-12-30 | End: 2025-01-06

## 2024-12-30 NOTE — TELEPHONE ENCOUNTER
----- Message from Melida sent at 12/30/2024  3:08 PM CST -----  Regarding: wrong med  .Type:  Needs Medical Advice    Who Called: pt  Symptoms (please be specific):    How long has patient had these symptoms:    Pharmacy name and phone #:    Would the patient rather a call back or a response via [facility]? cb  Best Call Back Number:  [phone]  Additional Information: (AUGMENTIN)  hurts pt stomach request call back  Pt was seen in office 12/30 - called back line office hung up

## 2024-12-30 NOTE — TELEPHONE ENCOUNTER
Pt called stating she can't take Augmentin because it hurts her stomach. She is requesting plain amoxicillin. Please advise

## 2024-12-30 NOTE — PROGRESS NOTES
Patient ID: Varsha Rg is a 69 y.o. female.    Chief Complaint: Sore Throat (Sudafed mild relief), Fever (102* x yesterday ibuprofen mild relief), and Chills      HPI:   Patient presents here today for above reason.     Ms. Nazario is a 69-year-old female presenting today with complaints of fever 102 at home sore throat that began over the weekend.  Also had chills needing many blankets to stay warm.  No other sick contacts.    The patient's Health Maintenance was reviewed and the following appears to be due at this time:   Health Maintenance Due   Topic Date Due    Hepatitis C Screening  Never done    Foot Exam  Never done    TETANUS VACCINE  Never done    Pneumococcal Vaccines (Age 50+) (1 of 2 - PCV) Never done    Shingles Vaccine (1 of 2) Never done    RSV Vaccine (Age 60+ and Pregnant patients) (1 - Risk 60-74 years 1-dose series) Never done    Mammogram  2024    Influenza Vaccine (1) 2024    COVID-19 Vaccine (2024- season) 2024    Diabetes Urine Screening  Never done    Eye Exam  Never done        Past Medical History:  Past Medical History:   Diagnosis Date    Anxiety and depression 2024    Arthritis     Benign paroxysmal vertigo, bilateral     Chronic back pain     Concussion     DVT (deep venous thrombosis)     Fibromyalgia     GERD (gastroesophageal reflux disease)     History of syncope     Hypercholesteremia     Hypertension     IBS (irritable bowel syndrome)     Lymphedema     Osteoporosis     Personal history of colonic polyps 2015    Skin cancer     SOB (shortness of breath)     Systemic lupus erythematosus, organ or system involvement unspecified     Thyroid disease     Unspecified cataract     Unspecified osteoarthritis, unspecified site      Past Surgical History:   Procedure Laterality Date    APPENDECTOMY      CARPAL TUNNEL RELEASE      CATARACT EXTRACTION Right 2022    CATARACT EXTRACTION Left 2022     SECTION      CHOLECYSTECTOMY       COLONOSCOPY  08/04/2015    EXCISION OF SQUAMOUS CELL CARCINOMA      FOOT FRACTURE SURGERY      HIATAL HERNIA REPAIR  01/24/2022    Dr. gould    HYSTERECTOMY      KNEE SURGERY      LAPAROSCOPIC SLEEVE GASTRECTOMY  01/24/2022    Dr. Gould    RECONSTRUCTION OF SHOULDER      TONSILLECTOMY      TRIGGER FINGER RELEASE      WRIST FRACTURE SURGERY       Review of patient's allergies indicates:   Allergen Reactions    Azithromycin Shortness Of Breath    Hydrocodone-acetaminophen Hallucinations    Pcn [penicillins]     Rosuvastatin     Levofloxacin Palpitations     Current Outpatient Medications on File Prior to Visit   Medication Sig Dispense Refill    clonazePAM (KLONOPIN) 2 MG Tab TAKE TWO TABLETS BY MOUTH AT BEDTIME 60 tablet 3    COLLAGEN MISC by Misc.(Non-Drug; Combo Route) route.      furosemide (LASIX) 20 MG tablet Take 20 mg by mouth daily as needed.      irbesartan (AVAPRO) 75 MG tablet Take 75 mg by mouth. 1/2 tablet daily      meclizine (ANTIVERT) 12.5 mg tablet Take 12.5 mg by mouth 3 (three) times daily as needed.      multivit-minerals/folic acid (CENTRUM ADULTS ORAL) Take by mouth.      pantoprazole (PROTONIX) 40 MG tablet Take 1 tablet (40 mg total) by mouth once daily. 90 tablet 1    protein supplement (PROTEIN ORAL) Take by mouth.      calcium citrate/vitamin D3 (CITRACAL + D ORAL) Take by mouth. (Patient not taking: Reported on 12/30/2024)      dicyclomine (BENTYL) 10 MG capsule Take 1 capsule (10 mg total) by mouth 3 (three) times daily. (Patient not taking: Reported on 12/30/2024) 30 capsule 0    FLUoxetine 20 MG capsule Take 1 capsule (20 mg total) by mouth nightly. (Patient not taking: Reported on 12/30/2024) 30 capsule 1    UNABLE TO FIND Sugarbear vitamins (Patient not taking: Reported on 12/30/2024)       No current facility-administered medications on file prior to visit.     Social History     Socioeconomic History    Marital status:    Tobacco Use    Smoking status: Never     Smokeless tobacco: Never   Substance and Sexual Activity    Alcohol use: Not Currently    Drug use: Never     Social Drivers of Health     Financial Resource Strain: Low Risk  (11/18/2024)    Overall Financial Resource Strain (CARDIA)     Difficulty of Paying Living Expenses: Not very hard   Food Insecurity: Food Insecurity Present (11/18/2024)    Hunger Vital Sign     Worried About Running Out of Food in the Last Year: Patient declined     Ran Out of Food in the Last Year: Sometimes true   Transportation Needs: No Transportation Needs (9/26/2022)    PRAPARE - Transportation     Lack of Transportation (Medical): No     Lack of Transportation (Non-Medical): No   Physical Activity: Sufficiently Active (11/18/2024)    Exercise Vital Sign     Days of Exercise per Week: 4 days     Minutes of Exercise per Session: 40 min   Stress: No Stress Concern Present (11/18/2024)    [employer] - Occupational Stress Questionnaire     Feeling of Stress : Only a little   Housing Stability: Low Risk  (9/26/2022)    Housing Stability Vital Sign     Unable to Pay for Housing in the Last Year: No     Number of Places Lived in the Last Year: 1     Unstable Housing in the Last Year: No     No family history on file.    ROS:   Comprehensive review of systems was performed and is negative except as noted above    Vitals/PE:   /76   Pulse 90   Temp 98.6 °F (37 °C)   Resp 18   Ht 5' (1.524 m)   Wt 79.8 kg (176 lb)   SpO2 98%   BMI 34.37 kg/m²   Physical Exam    General: Alert and oriented, No acute distress.   Eye: Normal conjunctiva without exudate.  HENMT: Normocephalic/AT, Normal hearing, Oral mucosa is moist and pink   Neck: No goiter visualized.   Respiratory: Lungs CTAB, Respirations are non-labored, Breath sounds are equal, Symmetrical chest wall expansion.  Cardiovascular: Normal rate, Regular rhythm, No murmur, No edema.   Gastrointestinal: Non-distended.   Genitourinary:  Deferred.  Musculoskeletal: Normal ROM, Normal gait, No deformities or amputations.  Integumentary: Warm, Dry, Intact. No diaphoresis, or flushing.  Neurologic: No focal deficits, Cranial Nerves II-XII are grossly intact.   Psychiatric: Cooperative, Appropriate mood & affect, Normal judgment, Non-suicidal.    Assessment/Plan:       1. Sore throat    2. Fever, unspecified fever cause    3. Chills         Plan:   One will swab for strep and flu  Will treat accordingly.    Education and counseling done face to face regarding medical conditions and plan. Contact office if new symptoms develop. Should any symptoms ever significantly worsen seek emergency medical attention/go to ER. Follow up at least yearly for wellness or sooner PRN. Nurse to call patient with any results. The patient is receptive, expresses understanding and is agreeable to plan. All questions have been answered.    No follow-ups on file.

## 2025-01-07 DIAGNOSIS — E11.9 TYPE 2 DIABETES MELLITUS WITHOUT COMPLICATION, WITHOUT LONG-TERM CURRENT USE OF INSULIN: Primary | ICD-10-CM

## 2025-01-14 ENCOUNTER — TELEPHONE (OUTPATIENT)
Dept: SURGERY | Facility: CLINIC | Age: 70
End: 2025-01-14
Payer: MEDICARE

## 2025-01-14 NOTE — TELEPHONE ENCOUNTER
Spoke with patient she will go to an [facility] facility prior to her upcoming appt to have her labs drawn   Will continue to follow up     Orders are in patient chart

## 2025-02-04 ENCOUNTER — LAB VISIT (OUTPATIENT)
Dept: LAB | Facility: HOSPITAL | Age: 70
End: 2025-02-04
Attending: INTERNAL MEDICINE
Payer: MEDICARE

## 2025-02-04 DIAGNOSIS — Z91.89 ELECTROLYTE IMBALANCE RISK: ICD-10-CM

## 2025-02-04 DIAGNOSIS — E11.9 TYPE 2 DIABETES MELLITUS WITHOUT COMPLICATION, UNSPECIFIED WHETHER LONG TERM INSULIN USE: ICD-10-CM

## 2025-02-04 DIAGNOSIS — Z13.21 ENCOUNTER FOR VITAMIN DEFICIENCY SCREENING: ICD-10-CM

## 2025-02-04 DIAGNOSIS — Z13.0 SCREENING FOR IRON DEFICIENCY ANEMIA: ICD-10-CM

## 2025-02-04 LAB
ALBUMIN SERPL-MCNC: 3.5 G/DL (ref 3.4–4.8)
ALBUMIN/GLOB SERPL: 0.8 RATIO (ref 1.1–2)
ALP SERPL-CCNC: 106 UNIT/L (ref 40–150)
ALT SERPL-CCNC: 11 UNIT/L (ref 0–55)
ANION GAP SERPL CALC-SCNC: 10 MEQ/L
AST SERPL-CCNC: 14 UNIT/L (ref 5–34)
BASOPHILS # BLD AUTO: 0.1 X10(3)/MCL
BASOPHILS NFR BLD AUTO: 0.8 %
BILIRUB SERPL-MCNC: 0.4 MG/DL
BUN SERPL-MCNC: 17.2 MG/DL (ref 9.8–20.1)
CALCIUM SERPL-MCNC: 9.6 MG/DL (ref 8.4–10.2)
CHLORIDE SERPL-SCNC: 106 MMOL/L (ref 98–107)
CO2 SERPL-SCNC: 27 MMOL/L (ref 23–31)
CREAT SERPL-MCNC: 0.75 MG/DL (ref 0.55–1.02)
CREAT/UREA NIT SERPL: 23
EOSINOPHIL # BLD AUTO: 0.32 X10(3)/MCL (ref 0–0.9)
EOSINOPHIL NFR BLD AUTO: 2.6 %
ERYTHROCYTE [DISTWIDTH] IN BLOOD BY AUTOMATED COUNT: 12.9 % (ref 11.5–17)
EST. AVERAGE GLUCOSE BLD GHB EST-MCNC: 93.9 MG/DL
GFR SERPLBLD CREATININE-BSD FMLA CKD-EPI: >60 ML/MIN/1.73/M2
GLOBULIN SER-MCNC: 4.4 GM/DL (ref 2.4–3.5)
GLUCOSE SERPL-MCNC: 84 MG/DL (ref 82–115)
HBA1C MFR BLD: 4.9 %
HCT VFR BLD AUTO: 44.2 % (ref 37–47)
HGB BLD-MCNC: 14.2 G/DL (ref 12–16)
IMM GRANULOCYTES # BLD AUTO: 0.03 X10(3)/MCL (ref 0–0.04)
IMM GRANULOCYTES NFR BLD AUTO: 0.2 %
IRON SATN MFR SERPL: 30 % (ref 20–50)
IRON SERPL-MCNC: 68 UG/DL (ref 50–170)
LYMPHOCYTES # BLD AUTO: 4.03 X10(3)/MCL (ref 0.6–4.6)
LYMPHOCYTES NFR BLD AUTO: 32.9 %
MCH RBC QN AUTO: 29.7 PG (ref 27–31)
MCHC RBC AUTO-ENTMCNC: 32.1 G/DL (ref 33–36)
MCV RBC AUTO: 92.5 FL (ref 80–94)
MONOCYTES # BLD AUTO: 0.97 X10(3)/MCL (ref 0.1–1.3)
MONOCYTES NFR BLD AUTO: 7.9 %
NEUTROPHILS # BLD AUTO: 6.8 X10(3)/MCL (ref 2.1–9.2)
NEUTROPHILS NFR BLD AUTO: 55.6 %
NRBC BLD AUTO-RTO: 0 %
PLATELET # BLD AUTO: 314 X10(3)/MCL (ref 130–400)
PMV BLD AUTO: 10.8 FL (ref 7.4–10.4)
POTASSIUM SERPL-SCNC: 3.3 MMOL/L (ref 3.5–5.1)
PROT SERPL-MCNC: 7.9 GM/DL (ref 5.8–7.6)
RBC # BLD AUTO: 4.78 X10(6)/MCL (ref 4.2–5.4)
SODIUM SERPL-SCNC: 143 MMOL/L (ref 136–145)
TIBC SERPL-MCNC: 156 UG/DL (ref 70–310)
TIBC SERPL-MCNC: 224 UG/DL (ref 250–450)
TRANSFERRIN SERPL-MCNC: 192 MG/DL (ref 173–360)
VIT B12 SERPL-MCNC: 619 PG/ML (ref 213–816)
WBC # BLD AUTO: 12.25 X10(3)/MCL (ref 4.5–11.5)

## 2025-02-04 PROCEDURE — 83540 ASSAY OF IRON: CPT

## 2025-02-04 PROCEDURE — 36415 COLL VENOUS BLD VENIPUNCTURE: CPT

## 2025-02-04 PROCEDURE — 80053 COMPREHEN METABOLIC PANEL: CPT

## 2025-02-04 PROCEDURE — 83036 HEMOGLOBIN GLYCOSYLATED A1C: CPT

## 2025-02-04 PROCEDURE — 84425 ASSAY OF VITAMIN B-1: CPT

## 2025-02-04 PROCEDURE — 85025 COMPLETE CBC W/AUTO DIFF WBC: CPT

## 2025-02-04 PROCEDURE — 82607 VITAMIN B-12: CPT

## 2025-02-07 LAB — VIT B1 BLD-SCNC: 105 NMOL/L (ref 70–180)

## 2025-02-11 NOTE — PROGRESS NOTES
Progress Note  Varsha Rg  Chief Complaint   Patient presents with    Follow-up     VSG 1/24/2022- 3 yr       History of Present Illness:  Ms. Varsha Rg is a 68 y.o. female who is 3 year s/p Lap Sleeve Gastrectomy on 1/24/22 by Dr. Jake Elmore.   Pathology benign.   Presents today for routine 3 year follow up appt.   No complaints. No dysphagia, odynophagia, GERD, NV, or abd pain. Regular BMs.     Pt reports that she is taking care of her mother who is 92 year old with dementia. Pt would like to lose another 25lbs but she is eating some carbs and has increased stress now due to her mother.      Diet: mostly compliant with bariatric meal plan  Exercise: no dedicated regular exercise  Vitamins: NOT compliant with bariatric supplementation per protocol    Labs (2/4/25): WNLs.       Height: 60in  Weights:   Trend Weights BMI   Starting       Pre-Op 225      1 Year 176 34.53   3 Year  175# 34      Estimated body mass index is 34.53 kg/m² as calculated from the following:    Height as of this encounter: 5' (1.524 m).    Weight as of this encounter: 80.2 kg (176 lb 12.8 oz).                Pertinent History:  HTN - [x] Yes   [] No    [] Resolved  HLD - [] Yes   [x] No    [] Resolved  DM  -  [] Yes   [x] No    [] Resolved  MIKEY - [] Yes   [x] No   [] Resolved  GERD - [] Yes   [x] No [] Resolved  Anticoagulation- [] Yes   [x] No      If yes, type: [] ASA [] Plavix [] Other    Patient Care Team:  Amrit Vogt II, MD as PCP - General (Internal Medicine)  Nomi Olivares MD as Consulting Physician (Gastroenterology)  Jake Elmore MD as Surgeon (Bariatrics)  Armando Jacques MD (Cardiovascular Disease)  Blanco Parnell MD (Orthopedic Surgery)  Dana Trinh (Inactive)     Subjective:     12 point review of systems conducted, negative except as stated in the history of present illness. See HPI for details.    Review of patient's allergies indicates:   Allergen Reactions    Azithromycin  Shortness Of Breath    Hydrocodone-acetaminophen Hallucinations    Pcn [penicillins]     Rosuvastatin     Levofloxacin Palpitations     Past Medical History:   Diagnosis Date    Allergy ?    Years ago    Anxiety and depression 2024    Arthritis     Benign paroxysmal vertigo, bilateral     Chronic back pain     Clotting disorder 1975    BLOOD CLOTS IN LEGS    Concussion     Deep vein thrombosis 1974    9 day hospital stay both legs    Degenerative disc disease ?    DVT (deep venous thrombosis)     Encounter for blood transfusion . And     2 pints for each surgery    Fibromyalgia     GERD (gastroesophageal reflux disease)     History of syncope     Hypercholesteremia     Hypertension     IBS (irritable bowel syndrome)     Lymphedema     Osteoporosis     Personal history of colonic polyps 2015    Skin cancer     SOB (shortness of breath)     Systemic lupus erythematosus, organ or system involvement unspecified     Thyroid disease     Ulcer 1998    Unspecified cataract     Unspecified osteoarthritis, unspecified site      Past Surgical History:   Procedure Laterality Date    ADENOIDECTOMY      Can't remember    APPENDECTOMY      CARPAL TUNNEL RELEASE      CATARACT EXTRACTION Right 2022    CATARACT EXTRACTION Left 2022     SECTION      CHOLECYSTECTOMY      COLONOSCOPY  2015    EXCISION OF SQUAMOUS CELL CARCINOMA      EYE SURGERY      FOOT FRACTURE SURGERY      GASTRIC BYPASS      Sleeve    HIATAL HERNIA REPAIR  2022    Dr. gould    HYSTERECTOMY      JOINT REPLACEMENT  2017    Knee    KNEE SURGERY      LAPAROSCOPIC SLEEVE GASTRECTOMY  2022    Dr. Gould    RECONSTRUCTION OF SHOULDER      TONSILLECTOMY      TRIGGER FINGER RELEASE      WRIST FRACTURE SURGERY       Family History   Problem Relation Name Age of Onset    Hypertension Mother Zoraida Marquez     Stroke Mother Zoraida Marquez     Heart disease Father Bar SINCLAIR Jimmy         CONGESTIVE HEART FAILURE     Cancer Brother DARIUS AND SANDIE ARREOLA         LIVER  AND PANCREATIC    Depression Sister GIUSEPPE ARREOLA         ALCOHOLIC AND DRUG USE     Social History     Tobacco Use    Smoking status: Never    Smokeless tobacco: Never    Tobacco comments:     Never   Substance Use Topics    Alcohol use: Not Currently     Comment: None in 3 yrs    Drug use: Never      Current Outpatient Medications   Medication Instructions    clonazePAM (KLONOPIN) 4 mg, Oral, Nightly    furosemide (LASIX) 20 mg, Daily PRN    irbesartan (AVAPRO) 75 mg    meclizine (ANTIVERT) 12.5 mg, 3 times daily PRN    protein supplement (PROTEIN ORAL) Take by mouth.       Objective:     Vital Signs (Most Recent):  Visit Vitals  /70   Pulse 80   Ht 5' (1.524 m)   Wt 79.4 kg (175 lb)   BMI 34.18 kg/m²       Physical Exam:  General:  Alert and oriented.    Respiratory:  Lungs are clear to auscultation, Respirations are non-labored, Breath sounds are equal.    Cardiovascular:  Normal rate, Regular rhythm, No murmur.    Gastrointestinal:  Soft, Non-tender, Non-distended, Normal bowel sounds        Musculoskeletal:  Normal range of motion, Normal strength.    Integumentary:  Warm, Dry, Pink.    Neurologic:  Alert, Oriented.    Psychiatric:  Cooperative.      Assessment:       ICD-10-CM ICD-9-CM   1. Encounter for weight loss counseling  Z71.3 V65.3   2. Dietary counseling  Z71.3 V65.3   3. Exercise counseling  Z71.82 V65.41   4. Obesity, unspecified class, unspecified obesity type, unspecified whether serious comorbidity present  E66.9 278.00   5. S/P gastric sleeve procedure  Z90.3 V45.75       Plan:     1. Encounter for weight loss counseling        2. Dietary counseling        3. Exercise counseling        4. Obesity, unspecified class, unspecified obesity type, unspecified whether serious comorbidity present        5. S/P gastric sleeve procedure          - gave pt information about the bariatric one a day multivitamin. Need to continue calcium  supplements while on them. continue  vitamin since it contains iron. please eat before taking, it can make pt's very nauseated on empty stomach.   - goal for next time. Go back to stage 5 in diet, which is low carbohydrate stage. follow for three months. Exercise 30min a day for three days a week and then increase to five days a week.   - Discussed possible surgical risks with patient that can occur at any point in time such as but not limited to vitamin and mineral deficiency, ulceration from smoking/NSAIDs/Anti-inflammatory medications, gallbladder disfunction, etc. If patient has any severe abd pain that is constant, nausea, vomiting, disruption of bowel movements, or has other concerns they are instructed to call the office or present to the emergency room. Pt verbalized understanding   - F/U 1 year with bariatric labs (annually)  - Continue exercising and following bariatric vitamin supplementation  - Support group attendance encouraged   - Keep routine appts with PCP/specialists as scheduled

## 2025-02-13 ENCOUNTER — PATIENT MESSAGE (OUTPATIENT)
Dept: SURGERY | Facility: CLINIC | Age: 70
End: 2025-02-13

## 2025-02-13 ENCOUNTER — OFFICE VISIT (OUTPATIENT)
Dept: SURGERY | Facility: CLINIC | Age: 70
End: 2025-02-13
Payer: MEDICARE

## 2025-02-13 VITALS
BODY MASS INDEX: 34.36 KG/M2 | HEART RATE: 80 BPM | WEIGHT: 175 LBS | SYSTOLIC BLOOD PRESSURE: 109 MMHG | HEIGHT: 60 IN | DIASTOLIC BLOOD PRESSURE: 70 MMHG

## 2025-02-13 DIAGNOSIS — Z90.3 S/P GASTRIC SLEEVE PROCEDURE: ICD-10-CM

## 2025-02-13 DIAGNOSIS — Z71.3 ENCOUNTER FOR WEIGHT LOSS COUNSELING: Primary | ICD-10-CM

## 2025-02-13 DIAGNOSIS — Z71.82 EXERCISE COUNSELING: ICD-10-CM

## 2025-02-13 DIAGNOSIS — E66.9 OBESITY, UNSPECIFIED CLASS, UNSPECIFIED OBESITY TYPE, UNSPECIFIED WHETHER SERIOUS COMORBIDITY PRESENT: ICD-10-CM

## 2025-02-13 DIAGNOSIS — Z71.3 DIETARY COUNSELING: ICD-10-CM

## 2025-02-13 PROCEDURE — 3008F BODY MASS INDEX DOCD: CPT | Mod: CPTII,,, | Performed by: NURSE PRACTITIONER

## 2025-02-13 PROCEDURE — 4010F ACE/ARB THERAPY RXD/TAKEN: CPT | Mod: CPTII,,, | Performed by: NURSE PRACTITIONER

## 2025-02-13 PROCEDURE — 3288F FALL RISK ASSESSMENT DOCD: CPT | Mod: CPTII,,, | Performed by: NURSE PRACTITIONER

## 2025-02-13 PROCEDURE — 3044F HG A1C LEVEL LT 7.0%: CPT | Mod: CPTII,,, | Performed by: NURSE PRACTITIONER

## 2025-02-13 PROCEDURE — 3078F DIAST BP <80 MM HG: CPT | Mod: CPTII,,, | Performed by: NURSE PRACTITIONER

## 2025-02-13 PROCEDURE — 99214 OFFICE O/P EST MOD 30 MIN: CPT | Mod: ,,, | Performed by: NURSE PRACTITIONER

## 2025-02-13 PROCEDURE — 3074F SYST BP LT 130 MM HG: CPT | Mod: CPTII,,, | Performed by: NURSE PRACTITIONER

## 2025-02-13 PROCEDURE — 1159F MED LIST DOCD IN RCRD: CPT | Mod: CPTII,,, | Performed by: NURSE PRACTITIONER

## 2025-02-13 PROCEDURE — 1101F PT FALLS ASSESS-DOCD LE1/YR: CPT | Mod: CPTII,,, | Performed by: NURSE PRACTITIONER

## 2025-02-26 ENCOUNTER — TELEPHONE (OUTPATIENT)
Dept: INTERNAL MEDICINE | Facility: CLINIC | Age: 70
End: 2025-02-26
Payer: MEDICARE

## 2025-02-26 DIAGNOSIS — R21 RASH: Primary | ICD-10-CM

## 2025-02-26 DIAGNOSIS — R53.83 FATIGUE, UNSPECIFIED TYPE: ICD-10-CM

## 2025-02-26 DIAGNOSIS — R52 BODY ACHES: ICD-10-CM

## 2025-02-26 NOTE — TELEPHONE ENCOUNTER
Santos, Ashley Marcus Staff  Caller: Unspecified (Today,  8:47 AM)  .Who Called: Varsha Rg        Preferred Method of Contact: Phone Call  Patient's Preferred Phone Number on File: [phone]  Best Call Back Number, if different:  Additional Information: pt asking for dr to go back look at her labs for her lupus she has a rash and joint pain as well and if not can he order labs for it

## 2025-02-26 NOTE — TELEPHONE ENCOUNTER
Pt thinks she is having a lupus flare up and is requesting labs. States she has a rash and joint pain. She was seeing Dr Figueroa but he is not taking Medicare. Please advise

## 2025-02-27 ENCOUNTER — LAB VISIT (OUTPATIENT)
Dept: LAB | Facility: HOSPITAL | Age: 70
End: 2025-02-27
Attending: INTERNAL MEDICINE
Payer: MEDICARE

## 2025-02-27 DIAGNOSIS — R52 BODY ACHES: ICD-10-CM

## 2025-02-27 DIAGNOSIS — R53.83 FATIGUE, UNSPECIFIED TYPE: ICD-10-CM

## 2025-02-27 DIAGNOSIS — R21 RASH: ICD-10-CM

## 2025-02-27 LAB
ALBUMIN SERPL-MCNC: 3.5 G/DL (ref 3.4–4.8)
ALBUMIN/GLOB SERPL: 0.8 RATIO (ref 1.1–2)
ALP SERPL-CCNC: 101 UNIT/L (ref 40–150)
ALT SERPL-CCNC: 12 UNIT/L (ref 0–55)
ANION GAP SERPL CALC-SCNC: 8 MEQ/L
AST SERPL-CCNC: 16 UNIT/L (ref 5–34)
BASOPHILS # BLD AUTO: 0.08 X10(3)/MCL
BASOPHILS NFR BLD AUTO: 0.7 %
BILIRUB SERPL-MCNC: 0.4 MG/DL
BUN SERPL-MCNC: 17.7 MG/DL (ref 9.8–20.1)
CALCIUM SERPL-MCNC: 9.3 MG/DL (ref 8.4–10.2)
CHLORIDE SERPL-SCNC: 107 MMOL/L (ref 98–107)
CO2 SERPL-SCNC: 29 MMOL/L (ref 23–31)
CREAT SERPL-MCNC: 0.75 MG/DL (ref 0.55–1.02)
CREAT/UREA NIT SERPL: 24
CRP SERPL-MCNC: 3.5 MG/L
EOSINOPHIL # BLD AUTO: 0.31 X10(3)/MCL (ref 0–0.9)
EOSINOPHIL NFR BLD AUTO: 2.8 %
ERYTHROCYTE [DISTWIDTH] IN BLOOD BY AUTOMATED COUNT: 13.2 % (ref 11.5–17)
ERYTHROCYTE [SEDIMENTATION RATE] IN BLOOD: 32 MM/HR (ref 0–20)
GFR SERPLBLD CREATININE-BSD FMLA CKD-EPI: >60 ML/MIN/1.73/M2
GLOBULIN SER-MCNC: 4.2 GM/DL (ref 2.4–3.5)
GLUCOSE SERPL-MCNC: 99 MG/DL (ref 82–115)
HCT VFR BLD AUTO: 42 % (ref 37–47)
HGB BLD-MCNC: 13.4 G/DL (ref 12–16)
IMM GRANULOCYTES # BLD AUTO: 0.03 X10(3)/MCL (ref 0–0.04)
IMM GRANULOCYTES NFR BLD AUTO: 0.3 %
LYMPHOCYTES # BLD AUTO: 4.15 X10(3)/MCL (ref 0.6–4.6)
LYMPHOCYTES NFR BLD AUTO: 37.3 %
MCH RBC QN AUTO: 29.8 PG (ref 27–31)
MCHC RBC AUTO-ENTMCNC: 31.9 G/DL (ref 33–36)
MCV RBC AUTO: 93.5 FL (ref 80–94)
MONOCYTES # BLD AUTO: 0.87 X10(3)/MCL (ref 0.1–1.3)
MONOCYTES NFR BLD AUTO: 7.8 %
NEUTROPHILS # BLD AUTO: 5.69 X10(3)/MCL (ref 2.1–9.2)
NEUTROPHILS NFR BLD AUTO: 51.1 %
NRBC BLD AUTO-RTO: 0 %
PLATELET # BLD AUTO: 262 X10(3)/MCL (ref 130–400)
PMV BLD AUTO: 11 FL (ref 7.4–10.4)
POTASSIUM SERPL-SCNC: 4 MMOL/L (ref 3.5–5.1)
PROT SERPL-MCNC: 7.7 GM/DL (ref 5.8–7.6)
RBC # BLD AUTO: 4.49 X10(6)/MCL (ref 4.2–5.4)
SODIUM SERPL-SCNC: 144 MMOL/L (ref 136–145)
TSH SERPL-ACNC: 6.02 UIU/ML (ref 0.35–4.94)
WBC # BLD AUTO: 11.13 X10(3)/MCL (ref 4.5–11.5)

## 2025-02-27 PROCEDURE — 36415 COLL VENOUS BLD VENIPUNCTURE: CPT

## 2025-02-27 PROCEDURE — 84443 ASSAY THYROID STIM HORMONE: CPT

## 2025-02-27 PROCEDURE — 86140 C-REACTIVE PROTEIN: CPT

## 2025-02-27 PROCEDURE — 80053 COMPREHEN METABOLIC PANEL: CPT

## 2025-02-27 PROCEDURE — 85025 COMPLETE CBC W/AUTO DIFF WBC: CPT

## 2025-02-27 PROCEDURE — 85652 RBC SED RATE AUTOMATED: CPT

## 2025-02-27 NOTE — TELEPHONE ENCOUNTER
Message  Received: Today  Meseret Coombs Staff  Caller: Unspecified (Today,  3:49 PM)  .Who Called: Varsha SINCLAIR Arcenio    Patient is returning phone call    Who Left Message for Patient:PT  Does the patient know what this is regarding?:pt wants cb on lab results--stated she seen they were abnormal and getting worried      Preferred Method of Contact: Phone Call  Patient's Preferred Phone Number on File: [phone]  Best Call Back Number, if different:  Additional Information: pt wants cb on lab results--stated she seen they were abnormal and getting worried

## 2025-02-28 ENCOUNTER — TELEPHONE (OUTPATIENT)
Dept: INTERNAL MEDICINE | Facility: CLINIC | Age: 70
End: 2025-02-28
Payer: MEDICARE

## 2025-02-28 NOTE — TELEPHONE ENCOUNTER
Pt notified of results and recommendations, voiced understanding. Transferred to ext [phone] to schedule appt

## 2025-02-28 NOTE — TELEPHONE ENCOUNTER
Message  Received: Today  Radha Caldwell Staff  Caller: Unspecified (Today,  8:16 AM)  .Type:  Test Results    Who Called: pt  Name of Test (Lab/Mammo/Etc): cbc, tsh and and a few others  Date of Test: 02/28/2025  Ordering Provider: Sin  Where the test was performed: Pt did not stat the lab  Would the patient rather a call back or a response via [facility]?   Best Call Back Number: [phone]  Additional Information:  Please call back about labs results. Patient stats she called yesterday and no one called back. She also has a bad rash from labs

## 2025-03-12 ENCOUNTER — RESULTS FOLLOW-UP (OUTPATIENT)
Dept: SURGERY | Facility: CLINIC | Age: 70
End: 2025-03-12

## 2025-03-12 NOTE — PROGRESS NOTES
Duplicate message. Guillermina Doss NP already discussed results with patient at most recent appointment on 2/13/25.

## 2025-05-09 ENCOUNTER — HOSPITAL ENCOUNTER (INPATIENT)
Facility: HOSPITAL | Age: 70
LOS: 10 days | Discharge: NURSING, REHAB OR LONG-TERM CARE | DRG: 481 | End: 2025-05-19
Attending: EMERGENCY MEDICINE | Admitting: SURGERY
Payer: MEDICARE

## 2025-05-09 DIAGNOSIS — Z01.818 PRE-OP EVALUATION: ICD-10-CM

## 2025-05-09 DIAGNOSIS — S89.92XA KNEE INJURY, LEFT, INITIAL ENCOUNTER: ICD-10-CM

## 2025-05-09 DIAGNOSIS — S72.492C: Primary | ICD-10-CM

## 2025-05-09 DIAGNOSIS — S72.422B: ICD-10-CM

## 2025-05-09 DIAGNOSIS — T14.90XA TRAUMA: ICD-10-CM

## 2025-05-09 DIAGNOSIS — S72.432B: ICD-10-CM
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
## 2025-05-09 LAB
ALBUMIN SERPL-MCNC: 3.3 G/DL (ref 3.4–4.8)
ALBUMIN/GLOB SERPL: 1 RATIO (ref 1.1–2)
ALP SERPL-CCNC: 92 UNIT/L (ref 40–150)
ALT SERPL-CCNC: 16 UNIT/L (ref 0–55)
ANION GAP SERPL CALC-SCNC: 8 MEQ/L
AST SERPL-CCNC: 18 UNIT/L (ref 11–45)
BASOPHILS # BLD AUTO: 0.08 X10(3)/MCL
BASOPHILS NFR BLD AUTO: 0.6 %
BILIRUB SERPL-MCNC: 0.5 MG/DL
BUN SERPL-MCNC: 17.5 MG/DL (ref 9.8–20.1)
CALCIUM SERPL-MCNC: 8.6 MG/DL (ref 8.4–10.2)
CHLORIDE SERPL-SCNC: 107 MMOL/L (ref 98–107)
CO2 SERPL-SCNC: 27 MMOL/L (ref 23–31)
CREAT SERPL-MCNC: 0.77 MG/DL (ref 0.55–1.02)
CREAT/UREA NIT SERPL: 23
EOSINOPHIL # BLD AUTO: 0.08 X10(3)/MCL (ref 0–0.9)
EOSINOPHIL NFR BLD AUTO: 0.6 %
ERYTHROCYTE [DISTWIDTH] IN BLOOD BY AUTOMATED COUNT: 12.9 % (ref 11.5–17)
GFR SERPLBLD CREATININE-BSD FMLA CKD-EPI: >60 ML/MIN/1.73/M2
GLOBULIN SER-MCNC: 3.4 GM/DL (ref 2.4–3.5)
GLUCOSE SERPL-MCNC: 122 MG/DL (ref 82–115)
GROUP & RH: NORMAL
HCT VFR BLD AUTO: 37.5 % (ref 37–47)
HGB BLD-MCNC: 12 G/DL (ref 12–16)
IMM GRANULOCYTES # BLD AUTO: 0.07 X10(3)/MCL (ref 0–0.04)
IMM GRANULOCYTES NFR BLD AUTO: 0.6 %
INDIRECT COOMBS: NORMAL
INR PPP: 1
LACTATE SERPL-SCNC: 2.1 MMOL/L (ref 0.5–2.2)
LACTATE SERPL-SCNC: 2.5 MMOL/L (ref 0.5–2.2)
LYMPHOCYTES # BLD AUTO: 2.99 X10(3)/MCL (ref 0.6–4.6)
LYMPHOCYTES NFR BLD AUTO: 24 %
MCH RBC QN AUTO: 29.7 PG (ref 27–31)
MCHC RBC AUTO-ENTMCNC: 32 G/DL (ref 33–36)
MCV RBC AUTO: 92.8 FL (ref 80–94)
MONOCYTES # BLD AUTO: 0.82 X10(3)/MCL (ref 0.1–1.3)
MONOCYTES NFR BLD AUTO: 6.6 %
NEUTROPHILS # BLD AUTO: 8.4 X10(3)/MCL (ref 2.1–9.2)
NEUTROPHILS NFR BLD AUTO: 67.6 %
NRBC BLD AUTO-RTO: 0 %
OHS QRS DURATION: 70 MS
OHS QTC CALCULATION: 425 MS
PLATELET # BLD AUTO: 222 X10(3)/MCL (ref 130–400)
PMV BLD AUTO: 11.2 FL (ref 7.4–10.4)
POTASSIUM SERPL-SCNC: 4.2 MMOL/L (ref 3.5–5.1)
PROT SERPL-MCNC: 6.7 GM/DL (ref 5.8–7.6)
PROTHROMBIN TIME: 13.4 SECONDS (ref 12.5–14.5)
RBC # BLD AUTO: 4.04 X10(6)/MCL (ref 4.2–5.4)
SODIUM SERPL-SCNC: 142 MMOL/L (ref 136–145)
SPECIMEN OUTDATE: NORMAL
TROPONIN I SERPL-MCNC: <0.01 NG/ML (ref 0–0.04)
WBC # BLD AUTO: 12.44 X10(3)/MCL (ref 4.5–11.5)

## 2025-05-09 PROCEDURE — 84484 ASSAY OF TROPONIN QUANT: CPT | Performed by: EMERGENCY MEDICINE

## 2025-05-09 PROCEDURE — 63600175 PHARM REV CODE 636 W HCPCS: Performed by: EMERGENCY MEDICINE

## 2025-05-09 PROCEDURE — 99223 1ST HOSP IP/OBS HIGH 75: CPT | Mod: ,,, | Performed by: SURGERY

## 2025-05-09 PROCEDURE — 86901 BLOOD TYPING SEROLOGIC RH(D): CPT | Performed by: ORTHOPAEDIC SURGERY

## 2025-05-09 PROCEDURE — 51798 US URINE CAPACITY MEASURE: CPT

## 2025-05-09 PROCEDURE — 99285 EMERGENCY DEPT VISIT HI MDM: CPT | Mod: 25

## 2025-05-09 PROCEDURE — 63600175 PHARM REV CODE 636 W HCPCS

## 2025-05-09 PROCEDURE — 12001 RPR S/N/AX/GEN/TRNK 2.5CM/<: CPT

## 2025-05-09 PROCEDURE — 93010 ELECTROCARDIOGRAM REPORT: CPT | Mod: ,,, | Performed by: INTERNAL MEDICINE

## 2025-05-09 PROCEDURE — 90471 IMMUNIZATION ADMIN: CPT | Performed by: EMERGENCY MEDICINE

## 2025-05-09 PROCEDURE — 21400001 HC TELEMETRY ROOM

## 2025-05-09 PROCEDURE — 93010 ELECTROCARDIOGRAM REPORT: CPT | Mod: 76,,, | Performed by: INTERNAL MEDICINE

## 2025-05-09 PROCEDURE — 96376 TX/PRO/DX INJ SAME DRUG ADON: CPT

## 2025-05-09 PROCEDURE — 25000003 PHARM REV CODE 250

## 2025-05-09 PROCEDURE — 85025 COMPLETE CBC W/AUTO DIFF WBC: CPT | Performed by: EMERGENCY MEDICINE

## 2025-05-09 PROCEDURE — 94799 UNLISTED PULMONARY SVC/PX: CPT

## 2025-05-09 PROCEDURE — 85610 PROTHROMBIN TIME: CPT

## 2025-05-09 PROCEDURE — 90715 TDAP VACCINE 7 YRS/> IM: CPT | Performed by: EMERGENCY MEDICINE

## 2025-05-09 PROCEDURE — 3E0234Z INTRODUCTION OF SERUM, TOXOID AND VACCINE INTO MUSCLE, PERCUTANEOUS APPROACH: ICD-10-PCS | Performed by: STUDENT IN AN ORGANIZED HEALTH CARE EDUCATION/TRAINING PROGRAM

## 2025-05-09 PROCEDURE — 83605 ASSAY OF LACTIC ACID: CPT

## 2025-05-09 PROCEDURE — 93005 ELECTROCARDIOGRAM TRACING: CPT

## 2025-05-09 PROCEDURE — 25000003 PHARM REV CODE 250: Performed by: STUDENT IN AN ORGANIZED HEALTH CARE EDUCATION/TRAINING PROGRAM

## 2025-05-09 PROCEDURE — 11000001 HC ACUTE MED/SURG PRIVATE ROOM

## 2025-05-09 PROCEDURE — 25000003 PHARM REV CODE 250: Performed by: EMERGENCY MEDICINE

## 2025-05-09 PROCEDURE — 96374 THER/PROPH/DIAG INJ IV PUSH: CPT

## 2025-05-09 PROCEDURE — 96375 TX/PRO/DX INJ NEW DRUG ADDON: CPT

## 2025-05-09 PROCEDURE — 29505 APPLICATION LONG LEG SPLINT: CPT | Mod: LT

## 2025-05-09 PROCEDURE — 80053 COMPREHEN METABOLIC PANEL: CPT | Performed by: EMERGENCY MEDICINE

## 2025-05-09 RX ORDER — METHOCARBAMOL 500 MG/1
500 TABLET, FILM COATED ORAL EVERY 8 HOURS
Status: DISCONTINUED | OUTPATIENT
Start: 2025-05-09 | End: 2025-05-19 | Stop reason: HOSPADM

## 2025-05-09 RX ORDER — DOCUSATE SODIUM 100 MG/1
100 CAPSULE, LIQUID FILLED ORAL 2 TIMES DAILY
Status: DISCONTINUED | OUTPATIENT
Start: 2025-05-09 | End: 2025-05-19 | Stop reason: HOSPADM

## 2025-05-09 RX ORDER — MORPHINE SULFATE 4 MG/ML
4 INJECTION, SOLUTION INTRAMUSCULAR; INTRAVENOUS
Refills: 0 | Status: COMPLETED | OUTPATIENT
Start: 2025-05-09 | End: 2025-05-09

## 2025-05-09 RX ORDER — ONDANSETRON HYDROCHLORIDE 2 MG/ML
8 INJECTION, SOLUTION INTRAVENOUS ONCE
Status: COMPLETED | OUTPATIENT
Start: 2025-05-09 | End: 2025-05-09

## 2025-05-09 RX ORDER — KETOROLAC TROMETHAMINE 30 MG/ML
15 INJECTION, SOLUTION INTRAMUSCULAR; INTRAVENOUS
Status: DISCONTINUED | OUTPATIENT
Start: 2025-05-09 | End: 2025-05-09

## 2025-05-09 RX ORDER — OXYCODONE HYDROCHLORIDE 5 MG/1
5 TABLET ORAL EVERY 4 HOURS PRN
Refills: 0 | Status: DISCONTINUED | OUTPATIENT
Start: 2025-05-09 | End: 2025-05-13

## 2025-05-09 RX ORDER — ADHESIVE BANDAGE
30 BANDAGE TOPICAL DAILY PRN
Status: DISCONTINUED | OUTPATIENT
Start: 2025-05-09 | End: 2025-05-19 | Stop reason: HOSPADM

## 2025-05-09 RX ORDER — LIDOCAINE AND PRILOCAINE 25; 25 MG/G; MG/G
CREAM TOPICAL
Status: COMPLETED | OUTPATIENT
Start: 2025-05-09 | End: 2025-05-09

## 2025-05-09 RX ORDER — SODIUM CHLORIDE, SODIUM LACTATE, POTASSIUM CHLORIDE, CALCIUM CHLORIDE 600; 310; 30; 20 MG/100ML; MG/100ML; MG/100ML; MG/100ML
INJECTION, SOLUTION INTRAVENOUS CONTINUOUS
Status: DISCONTINUED | OUTPATIENT
Start: 2025-05-09 | End: 2025-05-10

## 2025-05-09 RX ORDER — CEFTRIAXONE 2 G/1
2 INJECTION, POWDER, FOR SOLUTION INTRAMUSCULAR; INTRAVENOUS
Status: DISCONTINUED | OUTPATIENT
Start: 2025-05-09 | End: 2025-05-09

## 2025-05-09 RX ORDER — OXYCODONE HYDROCHLORIDE 10 MG/1
10 TABLET ORAL EVERY 4 HOURS PRN
Refills: 0 | Status: DISCONTINUED | OUTPATIENT
Start: 2025-05-09 | End: 2025-05-13

## 2025-05-09 RX ORDER — TALC
6 POWDER (GRAM) TOPICAL NIGHTLY PRN
Status: DISCONTINUED | OUTPATIENT
Start: 2025-05-09 | End: 2025-05-19 | Stop reason: HOSPADM

## 2025-05-09 RX ORDER — GABAPENTIN 300 MG/1
300 CAPSULE ORAL 3 TIMES DAILY
Status: DISCONTINUED | OUTPATIENT
Start: 2025-05-09 | End: 2025-05-13

## 2025-05-09 RX ORDER — ACETAMINOPHEN 325 MG/1
650 TABLET ORAL EVERY 4 HOURS
Status: DISPENSED | OUTPATIENT
Start: 2025-05-09 | End: 2025-05-14

## 2025-05-09 RX ORDER — ONDANSETRON HYDROCHLORIDE 2 MG/ML
8 INJECTION, SOLUTION INTRAVENOUS
Status: DISCONTINUED | OUTPATIENT
Start: 2025-05-09 | End: 2025-05-09

## 2025-05-09 RX ORDER — POLYETHYLENE GLYCOL 3350 17 G/17G
17 POWDER, FOR SOLUTION ORAL 2 TIMES DAILY
Status: DISCONTINUED | OUTPATIENT
Start: 2025-05-09 | End: 2025-05-19 | Stop reason: HOSPADM

## 2025-05-09 RX ORDER — ENOXAPARIN SODIUM 100 MG/ML
40 INJECTION SUBCUTANEOUS EVERY 12 HOURS
Status: DISCONTINUED | OUTPATIENT
Start: 2025-05-09 | End: 2025-05-19 | Stop reason: HOSPADM

## 2025-05-09 RX ORDER — CEFAZOLIN SODIUM 1 G/3ML
1 INJECTION, POWDER, FOR SOLUTION INTRAMUSCULAR; INTRAVENOUS
Status: COMPLETED | OUTPATIENT
Start: 2025-05-09 | End: 2025-05-09

## 2025-05-09 RX ORDER — PHENAZOPYRIDINE HYDROCHLORIDE 100 MG/1
100 TABLET, FILM COATED ORAL
Status: DISCONTINUED | OUTPATIENT
Start: 2025-05-09 | End: 2025-05-16

## 2025-05-09 RX ORDER — CEFTRIAXONE 2 G/1
2 INJECTION, POWDER, FOR SOLUTION INTRAMUSCULAR; INTRAVENOUS
Status: COMPLETED | OUTPATIENT
Start: 2025-05-09 | End: 2025-05-10

## 2025-05-09 RX ORDER — MORPHINE SULFATE 4 MG/ML
2 INJECTION, SOLUTION INTRAMUSCULAR; INTRAVENOUS
Status: COMPLETED | OUTPATIENT
Start: 2025-05-09 | End: 2025-05-09

## 2025-05-09 RX ADMIN — ACETAMINOPHEN 325MG 650 MG: 325 TABLET ORAL at 06:05

## 2025-05-09 RX ADMIN — SODIUM CHLORIDE, POTASSIUM CHLORIDE, SODIUM LACTATE AND CALCIUM CHLORIDE: 600; 310; 30; 20 INJECTION, SOLUTION INTRAVENOUS at 11:05

## 2025-05-09 RX ADMIN — ENOXAPARIN SODIUM 40 MG: 40 INJECTION SUBCUTANEOUS at 08:05

## 2025-05-09 RX ADMIN — LIDOCAINE AND PRILOCAINE: 25; 25 CREAM TOPICAL at 11:05

## 2025-05-09 RX ADMIN — ACETAMINOPHEN 325MG 650 MG: 325 TABLET ORAL at 09:05

## 2025-05-09 RX ADMIN — MORPHINE SULFATE 4 MG: 4 INJECTION INTRAVENOUS at 12:05

## 2025-05-09 RX ADMIN — SODIUM CHLORIDE, POTASSIUM CHLORIDE, SODIUM LACTATE AND CALCIUM CHLORIDE: 600; 310; 30; 20 INJECTION, SOLUTION INTRAVENOUS at 04:05

## 2025-05-09 RX ADMIN — MORPHINE SULFATE 2 MG: 4 INJECTION INTRAVENOUS at 10:05

## 2025-05-09 RX ADMIN — PHENAZOPYRIDINE HYDROCHLORIDE 100 MG: 100 TABLET ORAL at 09:05

## 2025-05-09 RX ADMIN — CEFTRIAXONE SODIUM 2 G: 2 INJECTION, POWDER, FOR SOLUTION INTRAMUSCULAR; INTRAVENOUS at 04:05

## 2025-05-09 RX ADMIN — OXYCODONE HYDROCHLORIDE 10 MG: 10 TABLET ORAL at 04:05

## 2025-05-09 RX ADMIN — CLOSTRIDIUM TETANI TOXOID ANTIGEN (FORMALDEHYDE INACTIVATED), CORYNEBACTERIUM DIPHTHERIAE TOXOID ANTIGEN (FORMALDEHYDE INACTIVATED), BORDETELLA PERTUSSIS TOXOID ANTIGEN (GLUTARALDEHYDE INACTIVATED), BORDETELLA PERTUSSIS FILAMENTOUS HEMAGGLUTININ ANTIGEN (FORMALDEHYDE INACTIVATED), BORDETELLA PERTUSSIS PERTACTIN ANTIGEN, AND BORDETELLA PERTUSSIS FIMBRIAE 2/3 ANTIGEN 0.5 ML: 5; 2; 2.5; 5; 3; 5 INJECTION, SUSPENSION INTRAMUSCULAR at 01:05

## 2025-05-09 RX ADMIN — CEFAZOLIN 1 G: 330 INJECTION, POWDER, FOR SOLUTION INTRAMUSCULAR; INTRAVENOUS at 11:05

## 2025-05-09 RX ADMIN — DOCUSATE SODIUM 100 MG: 100 CAPSULE, LIQUID FILLED ORAL at 08:05

## 2025-05-09 RX ADMIN — METHOCARBAMOL 500 MG: 500 TABLET ORAL at 09:05

## 2025-05-09 RX ADMIN — POLYETHYLENE GLYCOL 3350 17 G: 17 POWDER, FOR SOLUTION ORAL at 08:05

## 2025-05-09 RX ADMIN — ONDANSETRON 8 MG: 2 INJECTION INTRAMUSCULAR; INTRAVENOUS at 11:05

## 2025-05-09 NOTE — ED PROVIDER NOTES
"Encounter Date: 5/9/2025    SCRIBE #1 NOTE: I, Viri Toney, am scribing for, and in the presence of,  Matias Starr MD. I have scribed the following portions of the note - Other sections scribed: HPI, ROS, PE.       History     Chief Complaint   Patient presents with    Fall     Slip and fall outside just PTA. States her "leg went one way and my ankle went another". Also hit her head. Denies LOC and denies blood thinners. Reports of pain to left knee radiating down left leg to ankle with abrasions     Transfer     Tx from Ortho for femur fx.      Patient is a 70 year old female with a history of DVT, GERD, HTN, and IBS presenting to the ED as a transfer for a femur fracture. Patient states she was walking in a parking lot when she slipped and thinks she fell onto her back. Patient reports a couple helped her get up. Patient complains of left knee pain. Patient denies back pain, hip pain, and neck pain. Patient states she is not on blood thinners.    The history is provided by the patient and medical records.     Review of patient's allergies indicates:   Allergen Reactions    Azithromycin Shortness Of Breath    Hydrocodone-acetaminophen Hallucinations    Pcn [penicillins]     Rosuvastatin     Levofloxacin Palpitations     Past Medical History:   Diagnosis Date    Allergy ?    Years ago    Anxiety and depression 01/11/2024    Arthritis     Benign paroxysmal vertigo, bilateral     Chronic back pain     Clotting disorder 1975    BLOOD CLOTS IN LEGS    Concussion     Deep vein thrombosis 1974    9 day hospital stay both legs    Degenerative disc disease ?    DVT (deep venous thrombosis)     Encounter for blood transfusion 2017. And 2022    2 pints for each surgery    Fibromyalgia     GERD (gastroesophageal reflux disease)     History of syncope     Hypercholesteremia     Hypertension     IBS (irritable bowel syndrome)     Lymphedema     Osteoporosis     Personal history of colonic polyps 08/04/2015    Skin cancer  "    SOB (shortness of breath)     Systemic lupus erythematosus, organ or system involvement unspecified     Thyroid disease     Ulcer     Unspecified cataract     Unspecified osteoarthritis, unspecified site      Past Surgical History:   Procedure Laterality Date    ADENOIDECTOMY      Can't remember    APPENDECTOMY      CARPAL TUNNEL RELEASE      CATARACT EXTRACTION Right 2022    CATARACT EXTRACTION Left 2022     SECTION      CHOLECYSTECTOMY      COLONOSCOPY  2015    EXCISION OF SQUAMOUS CELL CARCINOMA      EYE SURGERY      FOOT FRACTURE SURGERY      GASTRIC BYPASS      Sleeve    HIATAL HERNIA REPAIR  2022    Dr. gould    HYSTERECTOMY      JOINT REPLACEMENT  2017    Knee    KNEE SURGERY      LAPAROSCOPIC SLEEVE GASTRECTOMY  2022    Dr. Gould    RECONSTRUCTION OF SHOULDER      TONSILLECTOMY      TRIGGER FINGER RELEASE      WRIST FRACTURE SURGERY       Family History   Problem Relation Name Age of Onset    Hypertension Mother Zoraida Arreola     Stroke Mother Zoraida Arreola     Heart disease Father Bar Arreola         CONGESTIVE HEART FAILURE    Cancer Brother DARIUS AND SANDIE ARREOLA         LIVER  AND PANCREATIC    Depression Sister GIUSEPPE ARREOLA         ALCOHOLIC AND DRUG USE     Social History[1]  Review of Systems   Musculoskeletal:  Negative for back pain and neck pain.        Left knee pain       Physical Exam     Initial Vitals [25 1003]   BP Pulse Resp Temp SpO2   129/67 80 (!) 22 97.7 °F (36.5 °C) 98 %      MAP       --         Physical Exam    Nursing note and vitals reviewed.  Constitutional: No distress.   HENT:   Head: Normocephalic and atraumatic.   Neck: Trachea normal.   Cardiovascular:  Normal rate and regular rhythm.           No murmur heard.  Pulmonary/Chest: Breath sounds normal. No respiratory distress.   Abdominal: Abdomen is soft. Bowel sounds are normal. She exhibits no distension. There is no abdominal tenderness.    Musculoskeletal:         General: Normal range of motion.      Lumbar back: Normal range of motion.      Comments: Sutured puncture wound to left anterior knee  Left knee tender to rotation     Neurological: She is alert and oriented to person, place, and time. She has normal strength. No cranial nerve deficit.   Skin: Skin is warm and dry. No rash noted.   Psychiatric: She has a normal mood and affect. Judgment normal.         ED Course   Procedures  Labs Reviewed   COMPREHENSIVE METABOLIC PANEL - Abnormal       Result Value    Sodium 142      Potassium 4.2      Chloride 107      CO2 27      Glucose 122 (*)     Blood Urea Nitrogen 17.5      Creatinine 0.77      Calcium 8.6      Protein Total 6.7      Albumin 3.3 (*)     Globulin 3.4      Albumin/Globulin Ratio 1.0 (*)     Bilirubin Total 0.5      ALP 92      ALT 16      AST 18      eGFR >60      Anion Gap 8.0      BUN/Creatinine Ratio 23     CBC WITH DIFFERENTIAL - Abnormal    WBC 12.44 (*)     RBC 4.04 (*)     Hgb 12.0      Hct 37.5      MCV 92.8      MCH 29.7      MCHC 32.0 (*)     RDW 12.9      Platelet 222      MPV 11.2 (*)     Neut % 67.6      Lymph % 24.0      Mono % 6.6      Eos % 0.6      Basophil % 0.6      Imm Grans % 0.6      Neut # 8.40      Lymph # 2.99      Mono # 0.82      Eos # 0.08      Baso # 0.08      Imm Gran # 0.07 (*)     NRBC% 0.0     TROPONIN I - Normal    Troponin-I <0.010     CBC W/ AUTO DIFFERENTIAL    Narrative:     The following orders were created for panel order CBC auto differential.  Procedure                               Abnormality         Status                     ---------                               -----------         ------                     CBC with Differential[[id number]]       Abnormal            Final result                 Please view results for these tests on the individual orders.   LACTIC ACID, PLASMA   PROTIME-INR   TYPE & SCREEN    Group & Rh A POS      Indirect Kathe GEL NEG      Specimen Outdate  05/12/2025 23:59          ECG Results              EKG 12-lead (Final result)        Collection Time Result Time QRS Duration OHS QTC Calculation    05/09/25 10:41:00 05/09/25 11:25:32 70 425                     Final result by Ankit, [facility] (05/09/25 11:25:35)                   Narrative:    Test Reason : T14.90XA,    Vent. Rate :  64 BPM     Atrial Rate :  64 BPM     P-R Int : 202 ms          QRS Dur :  70 ms      QT Int : 412 ms       P-R-T Axes :  41  15   4 degrees    QTcB Int : 425 ms    Normal sinus rhythm  Normal ECG  No previous ECGs available  Confirmed by Monroe Haider ([id number]) on 5/9/2025 11:25:30 AM    Referred By: AAAREFERRAL SELF           Confirmed By: Monroe Haider                                  Imaging Results               CT Knee Without Contrast Left (Final result)  Result time 05/09/25 12:00:03      Final result by Andrei Hooker MD (05/09/25 12:00:03)                   Narrative:    EXAMINATION  CT KNEE WITHOUT CONTRAST LEFT    CLINICAL HISTORY  surgical planning; Displaced fracture of lateral condyle of left femur, initial encounter for open fracture type I or II    TECHNIQUE  Non-contrast helical-acquisition CT images of the left knee were obtained.  Multiplanar reconstructions accomplished by a CT technologist at a separate workstation, pushed to PACS for physician review.    COMPARISON  9 May 2025 radiographs    FINDINGS  Images were reviewed in bone and soft tissue windows.    Exam quality: adequate for evaluation    As previously seen, there is a severely comminuted, displaced fracture of the distal femoral diaphysis, extending into the diametaphyseal region and involving the anterior aspect of the medial femoral condyle and the trochlear.  The dominant fracture component involves approximately 2.2 cm posterior displacement of the femoral condyles relative to the more proximal shaft.  Additional large fracture fragments are also displaced but less severely.  There is no  acutely displaced fracture involving the patella or partially visualized proximal tibia and fibula.    Extensive tricompartmental degenerative changes are present, with large marginal osteophytic projections of the femoral condyles and tibial plateau.  There is near-complete loss of the medial femorotibial joint space.  No periosteal reaction or destructive bony lesion is identified.  Small knee effusion is present, with associated fat-fluid level.    Minimal periarticular subcutaneous edema is present.  There is no expansile hematoma or drainable fluid collection.  Minimal disorganized fluid/hematoma is present through the popliteal fossa.  The visualized musculature and tendinous structures are without convincing high-grade focal injury or other suspicious abnormality within limitations of non-contrast CT assessment.    IMPRESSION  1. Markedly comminuted, displaced fracture of the distal femur, extending from the diametaphyseal region into the medial condyle and trochlear groove.  2. No other convincing acute osseous abnormality.  3. Small lipohemarthrosis.  ==========  This report was flagged in Epic as abnormal.      RADIATION DOSE  Automated tube current modulation, weight-based exposure dosing, and/or iterative reconstruction technique utilized to reach lowest reasonably achievable exposure rate.    DLP: 582 mGy*cm      Electronically signed by: Andrei Hooker  Date:    05/09/2025  Time:    12:00                                     X-Ray Femur 2 View Left (Final result)  Result time 05/09/25 12:22:43   Procedure changed from X-Ray Knee Complete 4 or More Views Left     Final result by Andrei Hooker MD (05/09/25 12:22:43)                   Narrative:    EXAMINATION  XR FEMUR 2 VIEW LEFT    CLINICAL HISTORY  leg pain/fall; Unspecified injury of left lower leg, initial encounter    TECHNIQUE  A total of 4 images submitted of the left femur.    COMPARISON  None available at the time of initial  interpretation.    FINDINGS  Severely comminuted fracture of the distal femur diametaphysis is noted, with approximately 1 shaft-width posterior displacement of the femoral condyles and patella relative to the femoral shaft.  Remaining osseous structures are without convincing acute or focal abnormality.  Visualized joints are congruent.  There are advanced degenerative alterations of the knee with near-complete loss of the medial femorotibial joint space.    The included soft tissues are without acute abnormality.    IMPRESSION  Severely comminuted, displaced distal femoral fracture.      Electronically signed by: Andrei Hooker  Date:    05/09/2025  Time:    12:22                      Wet Read by Soledad Khan DO (05/09/25 11:02:37, [facility] - Emergency Dept, Emergency Medicine)    Impacted comminuted distal femur fracture with  knee involvement                                     X-Ray Chest 1 View (Final result)  Result time 05/09/25 11:54:13      Final result by Tashi Rivera MD (05/09/25 11:54:13)                   Impression:      No acute chest disease is identified.      Electronically signed by: Tashi Rivera  Date:    05/09/2025  Time:    11:54               Narrative:    EXAMINATION:  XR CHEST 1 VIEW    CLINICAL HISTORY:  , Injury, unspecified, initial encounter.    COMPARISON:  September 1, 2023    FINDINGS:  No alveolar consolidation, effusion, or pneumothorax is seen.   The thoracic aorta is normal  cardiac silhouette, central pulmonary vessels and mediastinum are normal in size and are grossly unremarkable.   visualized osseous structures are grossly unremarkable.                                       Medications   lactated ringers infusion (has no administration in time range)   enoxaparin injection 40 mg (has no administration in time range)   acetaminophen tablet 650 mg (has no administration in time range)   oxyCODONE immediate release tablet 5 mg (has no  administration in time range)   oxyCODONE immediate release tablet Tab 10 mg (has no administration in time range)   methocarbamoL tablet 500 mg (has no administration in time range)   gabapentin capsule 300 mg (has no administration in time range)   melatonin tablet 6 mg (has no administration in time range)   polyethylene glycol packet 17 g (has no administration in time range)   docusate sodium capsule 100 mg (has no administration in time range)   magnesium hydroxide 400 mg/5 ml suspension 2,400 mg (has no administration in time range)   cefTRIAXone injection 2 g (has no administration in time range)   morphine injection 2 mg (2 mg Intravenous Given 5/9/25 1056)   ondansetron injection 8 mg (8 mg Intravenous Given 5/9/25 1103)   ceFAZolin injection 1 g (1 g Intravenous Given 5/9/25 1146)   LIDOcaine-prilocaine cream ( Topical (Top) Given 5/9/25 1146)   morphine injection 4 mg (4 mg Intravenous Given 5/9/25 1200)   Tdap vaccine injection 0.5 mL (0.5 mLs Intramuscular Given 5/9/25 1302)     Medical Decision Making  The differential diagnosis includes, but is not limited to, distal femur fracture    Patient has been given Ancef discussed case with orthopedics and Trauma will admit      Problems Addressed:  Fall, initial encounter: complicated acute illness or injury  Type III open comminuted intra-articular fracture of distal end of femur, left, initial encounter: complicated acute illness or injury that poses a threat to life or bodily functions    Amount and/or Complexity of Data Reviewed  Labs: ordered.  Radiology: ordered and independent interpretation performed.    Risk  Prescription drug management.  Decision regarding hospitalization.            Scribe Attestation:   Scribe #1: I performed the above scribed service and the documentation accurately describes the services I performed. I attest to the accuracy of the note.    Attending Attestation:           Physician Attestation for Scribe:  Physician  Attestation Statement for Scribe #1: I, Matias Starr MD, reviewed documentation, as scribed by Viri Toney in my presence, and it is both accurate and complete.             ED Course as of 05/09/25 1551   Fri May 09, 2025   1015 Patient fell at [facility] parking lot- not sure how landed but thinks landed on her back. Has pain to distal thigh and punctate wound to proximal knee. Pain with inability to stand. [LG]   1233 I spoke with Dr Olvera at [facility] ER who accepted patient. [LG]   1448 Paged trauma [AF]   1449 Discussed with trauma [AF]      ED Course User Index  [AF] Viri Toney  [LG] Soledad Khan DO                           Clinical Impression:  Final diagnoses:  [S89.92XA] Knee injury, left, initial encounter  [T14.90XA] Trauma  [S72.492C] Type III open comminuted intra-articular fracture of distal end of femur, left, initial encounter (Primary)  [W19.XXXA] Fall, initial encounter          ED Disposition Condition    Admit                     [1]   Social History  Tobacco Use    Smoking status: Never    Smokeless tobacco: Never    Tobacco comments:     Never   Substance Use Topics    Alcohol use: Not Currently     Comment: None in 3 yrs    Drug use: Never        Matias Starr III, MD  05/09/25 [id number]

## 2025-05-09 NOTE — ED PROVIDER NOTES
"Encounter Date: 5/9/2025       History     Chief Complaint   Patient presents with    Fall     Slip and fall outside just PTA. States her "leg went one way and my ankle went another". Also hit her head. Denies LOC and denies blood thinners. Reports of pain to left knee radiating down left leg to ankle with abrasions     Transfer     Tx from Ortho for femur fx.      HPI  Patient fell in parking lot of [employer]. She has hx of having a bad knee for which she was going to have a replacemnent performed by Dr Shipley but after a gastric sleeve procedureand weight loss the pain improved so she has waited. She comes in now with punctate open wound superior portion of knee on left leg and pain to distal thigh. I clinically suspect a femur fracture based on exam. She could not bear weight at scene.  Is not sure of tetnus status but has had recent procedures. No back or neck pain.denies hitting head or LOC.    Review of patient's allergies indicates:   Allergen Reactions    Azithromycin Shortness Of Breath    Hydrocodone-acetaminophen Hallucinations    Pcn [penicillins]     Rosuvastatin     Levofloxacin Palpitations     Past Medical History:   Diagnosis Date    Allergy ?    Years ago    Anxiety and depression 01/11/2024    Arthritis     Benign paroxysmal vertigo, bilateral     Chronic back pain     Clotting disorder 1975    BLOOD CLOTS IN LEGS    Concussion     Deep vein thrombosis 1974    9 day hospital stay both legs    Degenerative disc disease ?    DVT (deep venous thrombosis)     Encounter for blood transfusion 2017. And 2022    2 pints for each surgery    Fibromyalgia     GERD (gastroesophageal reflux disease)     History of syncope     Hypercholesteremia     Hypertension     IBS (irritable bowel syndrome)     Lymphedema     Osteoporosis     Personal history of colonic polyps 08/04/2015    Skin cancer     SOB (shortness of breath)     Systemic lupus erythematosus, organ or system involvement unspecified     Thyroid " disease     Ulcer 1998    Unspecified cataract     Unspecified osteoarthritis, unspecified site      Past Surgical History:   Procedure Laterality Date    ADENOIDECTOMY      Can't remember    APPENDECTOMY      CARPAL TUNNEL RELEASE      CATARACT EXTRACTION Right 2022    CATARACT EXTRACTION Left 2022     SECTION      CHOLECYSTECTOMY      COLONOSCOPY  2015    EXCISION OF SQUAMOUS CELL CARCINOMA      EYE SURGERY      FOOT FRACTURE SURGERY      GASTRIC BYPASS      Sleeve    HIATAL HERNIA REPAIR  2022    Dr. gould    HYSTERECTOMY      JOINT REPLACEMENT  2017    Knee    KNEE SURGERY      LAPAROSCOPIC SLEEVE GASTRECTOMY  2022    Dr. Gould    RECONSTRUCTION OF SHOULDER      TONSILLECTOMY      TRIGGER FINGER RELEASE      WRIST FRACTURE SURGERY       Family History   Problem Relation Name Age of Onset    Hypertension Mother Zoraida Arreola     Stroke Mother Zoraida Arreola     Heart disease Father Bar Arreola         CONGESTIVE HEART FAILURE    Cancer Brother DARIUS AND SANDIE ARREOLA         LIVER  AND PANCREATIC    Depression Sister GIUSEPPE ARREOLA         ALCOHOLIC AND DRUG USE     Social History[1]  Review of Systems   Constitutional:  Positive for activity change.   HENT: Negative.     Respiratory: Negative.     Cardiovascular: Negative.    Gastrointestinal: Negative.    Musculoskeletal:  Positive for arthralgias, gait problem, joint swelling and myalgias.   Skin:  Positive for wound.   Neurological:  Positive for weakness.   Psychiatric/Behavioral: Negative.     All other systems reviewed and are negative.      Physical Exam     Initial Vitals [25 1003]   BP Pulse Resp Temp SpO2   129/67 80 (!) 22 97.7 °F (36.5 °C) 98 %      MAP       --         Physical Exam    Nursing note and vitals reviewed.  Constitutional: She appears well-nourished.   Large obese female    HENT:   Head: Normocephalic and atraumatic.   Nose: Nose normal. Mouth/Throat: Oropharynx is clear and  moist.   Eyes: EOM are normal. Pupils are equal, round, and reactive to light.   Neck: Neck supple.   No midline tenderness to palpation   Normal range of motion.  Cardiovascular:  Normal rate, regular rhythm, normal heart sounds and intact distal pulses.           Pulmonary/Chest: Breath sounds normal.   Abdominal: Abdomen is soft. Bowel sounds are normal.   Large and protuberant   Musculoskeletal:      Cervical back: Normal range of motion and neck supple.      Comments: Fleshy large legs b/l with knobby knees, soft tissue swelling and fullness appreciated distal femur extending to top of knee where she has a punctate hole with oozing. Wiggles toes well, sensation distally intact, knee and femur pain on exam LLE     Neurological: She is alert and oriented to person, place, and time.   Skin: Skin is warm.   Psychiatric: She has a normal mood and affect.         ED Course   Procedures  Labs Reviewed   COMPREHENSIVE METABOLIC PANEL - Abnormal       Result Value    Sodium 142      Potassium 4.2      Chloride 107      CO2 27      Glucose 122 (*)     Blood Urea Nitrogen 17.5      Creatinine 0.77      Calcium 8.6      Protein Total 6.7      Albumin 3.3 (*)     Globulin 3.4      Albumin/Globulin Ratio 1.0 (*)     Bilirubin Total 0.5      ALP 92      ALT 16      AST 18      eGFR >60      Anion Gap 8.0      BUN/Creatinine Ratio 23     CBC WITH DIFFERENTIAL - Abnormal    WBC 12.44 (*)     RBC 4.04 (*)     Hgb 12.0      Hct 37.5      MCV 92.8      MCH 29.7      MCHC 32.0 (*)     RDW 12.9      Platelet 222      MPV 11.2 (*)     Neut % 67.6      Lymph % 24.0      Mono % 6.6      Eos % 0.6      Basophil % 0.6      Imm Grans % 0.6      Neut # 8.40      Lymph # 2.99      Mono # 0.82      Eos # 0.08      Baso # 0.08      Imm Gran # 0.07 (*)     NRBC% 0.0     TROPONIN I - Normal    Troponin-I <0.010     LACTIC ACID, PLASMA - Normal    Lactic Acid Level 2.1     PROTIME-INR - Normal    PT 13.4      INR 1.0      Narrative:     Protimes  are used to monitor anticoagulant agents such as warfarin. PT INR values are based on the current patient normal mean and the RANJIT value for the specific instrument reagent used.  **Routine theraputic target values for the INR are 2.0-3.0**   CBC W/ AUTO DIFFERENTIAL    Narrative:     The following orders were created for panel order CBC auto differential.  Procedure                               Abnormality         Status                     ---------                               -----------         ------                     CBC with Differential[[id number]]       Abnormal            Final result                 Please view results for these tests on the individual orders.   LACTIC ACID, PLASMA   TYPE & SCREEN    Group & Rh A POS      Indirect Kathe GEL NEG      Specimen Outdate 05/12/2025 23:59       EKG Readings: (Independently Interpreted)   Initial Reading: No STEMI. Rhythm: Normal Sinus Rhythm. Heart Rate: HR 64. Ectopy: No Ectopy. Conduction: Normal. ST Segments: Normal ST Segments. T Waves: Normal. Axis: Normal. Clinical Impression: Normal Sinus Rhythm     ECG Results              EKG 12-lead (Final result)        Collection Time Result Time QRS Duration OHS QTC Calculation    05/09/25 10:41:00 05/09/25 11:25:32 70 425                     Final result by Interface, Lab In [facility] (05/09/25 11:25:35)                   Narrative:    Test Reason : T14.90XA,    Vent. Rate :  64 BPM     Atrial Rate :  64 BPM     P-R Int : 202 ms          QRS Dur :  70 ms      QT Int : 412 ms       P-R-T Axes :  41  15   4 degrees    QTcB Int : 425 ms    Normal sinus rhythm  Normal ECG  No previous ECGs available  Confirmed by Monroe Haider ([id number]) on 5/9/2025 11:25:30 AM    Referred By: AAAREFERRAL SELF           Confirmed By: Monroe Haider                                  Imaging Results               CT Knee Without Contrast Left (Final result)  Result time 05/09/25 12:00:03      Final result by Andrei Hooker MD  (05/09/25 12:00:03)                   Narrative:    EXAMINATION  CT KNEE WITHOUT CONTRAST LEFT    CLINICAL HISTORY  surgical planning; Displaced fracture of lateral condyle of left femur, initial encounter for open fracture type I or II    TECHNIQUE  Non-contrast helical-acquisition CT images of the left knee were obtained.  Multiplanar reconstructions accomplished by a CT technologist at a separate workstation, pushed to PACS for physician review.    COMPARISON  9 May 2025 radiographs    FINDINGS  Images were reviewed in bone and soft tissue windows.    Exam quality: adequate for evaluation    As previously seen, there is a severely comminuted, displaced fracture of the distal femoral diaphysis, extending into the diametaphyseal region and involving the anterior aspect of the medial femoral condyle and the trochlear.  The dominant fracture component involves approximately 2.2 cm posterior displacement of the femoral condyles relative to the more proximal shaft.  Additional large fracture fragments are also displaced but less severely.  There is no acutely displaced fracture involving the patella or partially visualized proximal tibia and fibula.    Extensive tricompartmental degenerative changes are present, with large marginal osteophytic projections of the femoral condyles and tibial plateau.  There is near-complete loss of the medial femorotibial joint space.  No periosteal reaction or destructive bony lesion is identified.  Small knee effusion is present, with associated fat-fluid level.    Minimal periarticular subcutaneous edema is present.  There is no expansile hematoma or drainable fluid collection.  Minimal disorganized fluid/hematoma is present through the popliteal fossa.  The visualized musculature and tendinous structures are without convincing high-grade focal injury or other suspicious abnormality within limitations of non-contrast CT assessment.    IMPRESSION  1. Markedly comminuted, displaced  fracture of the distal femur, extending from the diametaphyseal region into the medial condyle and trochlear groove.  2. No other convincing acute osseous abnormality.  3. Small lipohemarthrosis.  ==========  This report was flagged in Epic as abnormal.      RADIATION DOSE  Automated tube current modulation, weight-based exposure dosing, and/or iterative reconstruction technique utilized to reach lowest reasonably achievable exposure rate.    DLP: 582 mGy*cm      Electronically signed by: Andrei Hooker  Date:    05/09/2025  Time:    12:00                                     X-Ray Femur 2 View Left (Final result)  Result time 05/09/25 12:22:43   Procedure changed from X-Ray Knee Complete 4 or More Views Left     Final result by Andrei Hooker MD (05/09/25 12:22:43)                   Narrative:    EXAMINATION  XR FEMUR 2 VIEW LEFT    CLINICAL HISTORY  leg pain/fall; Unspecified injury of left lower leg, initial encounter    TECHNIQUE  A total of 4 images submitted of the left femur.    COMPARISON  None available at the time of initial interpretation.    FINDINGS  Severely comminuted fracture of the distal femur diametaphysis is noted, with approximately 1 shaft-width posterior displacement of the femoral condyles and patella relative to the femoral shaft.  Remaining osseous structures are without convincing acute or focal abnormality.  Visualized joints are congruent.  There are advanced degenerative alterations of the knee with near-complete loss of the medial femorotibial joint space.    The included soft tissues are without acute abnormality.    IMPRESSION  Severely comminuted, displaced distal femoral fracture.      Electronically signed by: Andrei Hooker  Date:    05/09/2025  Time:    12:22                      Wet Read by Soledad Khan DO (05/09/25 11:02:37, [facility] - Emergency Dept, Emergency Medicine)    Impacted comminuted distal femur fracture with  knee involvement                                      X-Ray Chest 1 View (Final result)  Result time 05/09/25 11:54:13      Final result by Tashi Rivera MD (05/09/25 11:54:13)                   Impression:      No acute chest disease is identified.      Electronically signed by: Tashi Rivera  Date:    05/09/2025  Time:    11:54               Narrative:    EXAMINATION:  XR CHEST 1 VIEW    CLINICAL HISTORY:  , Injury, unspecified, initial encounter.    COMPARISON:  September 1, 2023    FINDINGS:  No alveolar consolidation, effusion, or pneumothorax is seen.   The thoracic aorta is normal  cardiac silhouette, central pulmonary vessels and mediastinum are normal in size and are grossly unremarkable.   visualized osseous structures are grossly unremarkable.                                       Medications   lactated ringers infusion ( Intravenous New Bag 5/9/25 1613)   enoxaparin injection 40 mg (has no administration in time range)   acetaminophen tablet 650 mg (has no administration in time range)   oxyCODONE immediate release tablet 5 mg (has no administration in time range)   oxyCODONE immediate release tablet Tab 10 mg (10 mg Oral Given 5/9/25 1601)   methocarbamoL tablet 500 mg (has no administration in time range)   gabapentin capsule 300 mg (300 mg Oral Not Given 5/9/25 1601)   melatonin tablet 6 mg (has no administration in time range)   polyethylene glycol packet 17 g (has no administration in time range)   docusate sodium capsule 100 mg (has no administration in time range)   magnesium hydroxide 400 mg/5 ml suspension 2,400 mg (has no administration in time range)   cefTRIAXone injection 2 g (2 g Intravenous Given 5/9/25 1604)   morphine injection 2 mg (2 mg Intravenous Given 5/9/25 1056)   ondansetron injection 8 mg (8 mg Intravenous Given 5/9/25 1103)   ceFAZolin injection 1 g (1 g Intravenous Given 5/9/25 1146)   LIDOcaine-prilocaine cream ( Topical (Top) Given 5/9/25 1146)   morphine injection 4 mg (4 mg Intravenous Given  5/9/25 1200)   Tdap vaccine injection 0.5 mL (0.5 mLs Intramuscular Given 5/9/25 1302)     Medical Decision Making  Differential would be dislocation L knee vs femur fx vs knee fracture  I have reviewed her medical history and pertinant medical issues.   She can take pain meds but they make her hallucinate. Spouse says she cantake toradol but with her hx of gastric sleeve I do not want to give her this. Will look at films and give narcotic if broken.  Review of film shows comminuted distal femur fx - will give morphine and see how she does.  I communicated with Dr Kennedy who asked to close open wound and order CT knee and plan to transfer to [facility] for OR in am. He reviewed films.  Labs reviewed, stable cbc and cmp on review today.  Knee immobilizer placed after wound repaired   Plan to transfer to ER per Dr Kennedy    Amount and/or Complexity of Data Reviewed  Labs: ordered.  Radiology: ordered and independent interpretation performed.    Risk  Prescription drug management.  Decision regarding hospitalization.               ED Course as of 05/09/25 1851   Fri May 09, 2025   1015 Patient fell at [employer] lot- not sure how landed but thinks landed on her back. Has pain to distal thigh and punctate wound to proximal knee. Pain with inability to stand. [LG]   1233 I spoke with Dr Olvera at [facility] ER who accepted patient. [LG]   1448 Paged trauma [AF]   1449 Discussed with trauma [AF]      ED Course User Index  [AF] Viri Toney  [LG] Soledad Khan, DO          Procedure:  Betadine prep to area  Under sterile drape I irrigated small 1cm split skin with saline till clear.  I then placed 2 simple sutures using 4.- prolene to approximate wound edges.   We then covered with ABD pad, kerlex wrap and knee immolizer placed.  Good distal pulse noted in foot after wards.  Dr Kennedy asked to transfer to Trauma service at [facility]. ER called.      Melody dillard is fine.  Morphine was given and she is much more  comfortable           Clinical Impression: fall, knee pain  Final diagnoses:  [S89.92XA] Knee injury, left, initial encounter  [T14.90XA] Trauma  [S72.492C] Type III open comminuted intra-articular fracture of distal end of femur, left, initial encounter (Primary)  [W19.XXXA] Fall, initial encounter          ED Disposition Condition    Admit                   Soledad Khan DO  05/09/25 1237       [1]   Social History  Tobacco Use    Smoking status: Never    Smokeless tobacco: Never    Tobacco comments:     Never   Substance Use Topics    Alcohol use: Not Currently     Comment: None in 3 yrs    Drug use: Never        Soledad Khan DO  05/09/25 [id number]

## 2025-05-09 NOTE — H&P
Trauma Surgery   History and Physical     Patient Name: Varsha Rg  YOB: 1955  Date: 05/09/2025 3:29 PM  Date of Admission: 5/9/2025  HD#0  POD#* No surgery found *    PRESENTING HISTORY   Chief Complaint/Reason for Admission: <principal problem not specified>    History of Present Illness:  69 yo as a transfer from [facility].  She has an extensive past medical history.  Significant history includes history of DVT, GERD, hypertension, IBS and prior sleeve gastrectomy.  She reportedly fell in a parking lot of a [employer] after slipping in the rain.  She states her leg went one way and her ankle another.  Denies any loss of consciousness and denies being on any blood thinners.  She reports pain to the left leg.     Review of Systems:  12 point ROS negative except as stated in HPI    PAST HISTORY:   Past medical history:  Past Medical History:   Diagnosis Date    Allergy ?    Years ago    Anxiety and depression 01/11/2024    Arthritis     Benign paroxysmal vertigo, bilateral     Chronic back pain     Clotting disorder 1975    BLOOD CLOTS IN LEGS    Concussion     Deep vein thrombosis 1974    9 day hospital stay both legs    Degenerative disc disease ?    DVT (deep venous thrombosis)     Encounter for blood transfusion 2017. And 2022    2 pints for each surgery    Fibromyalgia     GERD (gastroesophageal reflux disease)     History of syncope     Hypercholesteremia     Hypertension     IBS (irritable bowel syndrome)     Lymphedema     Osteoporosis     Personal history of colonic polyps 08/04/2015    Skin cancer     SOB (shortness of breath)     Systemic lupus erythematosus, organ or system involvement unspecified     Thyroid disease     Ulcer 1998    Unspecified cataract     Unspecified osteoarthritis, unspecified site        Past surgical history:  Past Surgical History:   Procedure Laterality Date    ADENOIDECTOMY      Can't remember    APPENDECTOMY      CARPAL TUNNEL RELEASE      CATARACT  EXTRACTION Right 2022    CATARACT EXTRACTION Left 2022     SECTION      CHOLECYSTECTOMY      COLONOSCOPY  2015    EXCISION OF SQUAMOUS CELL CARCINOMA      EYE SURGERY      FOOT FRACTURE SURGERY      GASTRIC BYPASS      Sleeve    HIATAL HERNIA REPAIR  2022    Dr. gould    HYSTERECTOMY      JOINT REPLACEMENT  2017    Knee    KNEE SURGERY      LAPAROSCOPIC SLEEVE GASTRECTOMY  2022    Dr. Gould    RECONSTRUCTION OF SHOULDER      TONSILLECTOMY      TRIGGER FINGER RELEASE      WRIST FRACTURE SURGERY         Family history:  Family History   Problem Relation Name Age of Onset    Hypertension Mother Zoraida Arreola     Stroke Mother Zoraida Arreola     Heart disease Father Bar Arreola         CONGESTIVE HEART FAILURE    Cancer Brother DARIUS AND SANDIE ARREOLA         LIVER  AND PANCREATIC    Depression Sister GIUSEPPE ARREOLA         ALCOHOLIC AND DRUG USE       Social history:  Social History     Socioeconomic History    Marital status:    Tobacco Use    Smoking status: Never    Smokeless tobacco: Never    Tobacco comments:     Never   Substance and Sexual Activity    Alcohol use: Not Currently     Comment: None in 3 yrs    Drug use: Never    Sexual activity: Not Currently     Partners: Male     Birth control/protection: Condom, Diaphragm, I.U.D., Post-menopausal     Social Drivers of Health     Financial Resource Strain: Low Risk  (2025)    Overall Financial Resource Strain (CARDIA)     Difficulty of Paying Living Expenses: Not very hard   Food Insecurity: No Food Insecurity (2025)    Hunger Vital Sign     Worried About Running Out of Food in the Last Year: Never true     Ran Out of Food in the Last Year: Never true   Recent Concern: Food Insecurity - Food Insecurity Present (2024)    Hunger Vital Sign     Worried About Running Out of Food in the Last Year: Patient declined     Ran Out of Food in the Last Year: Sometimes true   Transportation Needs:  No Transportation Needs (2/12/2025)    PRAPARE - Transportation     Lack of Transportation (Medical): No     Lack of Transportation (Non-Medical): No   Physical Activity: Insufficiently Active (2/12/2025)    Exercise Vital Sign     Days of Exercise per Week: 2 days     Minutes of Exercise per Session: 50 min   Stress: Stress Concern Present (2/12/2025)    [employer] - Occupational Stress Questionnaire     Feeling of Stress : To some extent   Housing Stability: Low Risk  (2/12/2025)    Housing Stability Vital Sign     Unable to Pay for Housing in the Last Year: No     Number of Times Moved in the Last Year: 0     Homeless in the Last Year: No     Tobacco Use History[1]   Social History     Substance and Sexual Activity   Alcohol Use Not Currently    Comment: None in 3 yrs        MEDICATIONS & ALLERGIES:   Allergies:   Review of patient's allergies indicates:   Allergen Reactions    Azithromycin Shortness Of Breath    Hydrocodone-acetaminophen Hallucinations    Pcn [penicillins]     Rosuvastatin     Levofloxacin Palpitations     Home Meds:   Current Outpatient Medications   Medication Instructions    clonazePAM (KLONOPIN) 4 mg, Oral, Nightly    furosemide (LASIX) 20 mg, Daily PRN    irbesartan (AVAPRO) 75 mg    meclizine (ANTIVERT) 12.5 mg, 3 times daily PRN    protein supplement (PROTEIN ORAL) Take by mouth.    Medications Ordered Prior to Encounter[2]   No current facility-administered medications on file prior to encounter.     Current Outpatient Medications on File Prior to Encounter   Medication Sig    clonazePAM (KLONOPIN) 2 MG Tab TAKE TWO TABLETS BY MOUTH AT BEDTIME    furosemide (LASIX) 20 MG tablet Take 20 mg by mouth daily as needed.    irbesartan (AVAPRO) 75 MG tablet Take 75 mg by mouth. 1/2 tablet daily    meclizine (ANTIVERT) 12.5 mg tablet Take 12.5 mg by mouth 3 (three) times daily as needed.    protein supplement (PROTEIN ORAL) Take by mouth.     Scheduled Meds:    acetaminophen  650 mg Oral Q4H    cefTRIAXone (Rocephin) IV (PEDS and ADULTS)  2 g Intravenous Q24H    docusate sodium  100 mg Oral BID    enoxparin  40 mg Subcutaneous Q12H    gabapentin  300 mg Oral TID    methocarbamoL  500 mg Oral Q8H    polyethylene glycol  17 g Oral BID     Continuous Infusions:   lactated ringers   Intravenous Continuous         PRN Meds:  Current Facility-Administered Medications:     magnesium hydroxide 400 mg/5 ml, 30 mL, Oral, Daily PRN    melatonin, 6 mg, Oral, Nightly PRN    oxyCODONE, 5 mg, Oral, Q4H PRN    oxyCODONE, 10 mg, Oral, Q4H PRN    OBJECTIVE:   Vital Signs:  VITAL SIGNS: 24 HR MIN & MAX LAST   Temp  Min: 97.7 °F (36.5 °C)  Max: 97.7 °F (36.5 °C)  97.7 °F (36.5 °C)   BP  Min: 110/59  Max: 145/86  122/60    Pulse  Min: 68  Max: 91  85    Resp  Min: 14  Max: 22  18    SpO2  Min: 95 %  Max: 100 %  98 %      HT: 5' (152.4 cm)  WT: 79.4 kg (175 lb)  BMI: 34.2   Intake/output: No intake/output data recorded.     Lines/drains/airway:       Peripheral IV - Single Lumen 05/09/25 1103 20 G Anterior;Right Upper Arm (Active)   Site Assessment Clean;Dry;Intact;No redness;No swelling 05/09/25 1105   Line Securement Device Antimicrobial Adhesive 05/09/25 1105   Extremity Assessment Distal to IV No redness;No abnormal discoloration;No swelling;No warmth 05/09/25 1105   Line Status Blood return noted;Flushed;Saline locked 05/09/25 1105   Dressing Status Clean;Dry;Intact 05/09/25 1105   Dressing Intervention First dressing 05/09/25 1105   Number of days: 0       Physical Exam:  General:  Well developed, well nourished, no acute distress  HEENT:  Normocephalic, atraumatic  CV:  RR, 2+ DPs bilaterally  Resp/chest: NWOB  GI:  Abdomen soft, non-tender, non-distended  :  Deferred  MSK:  No muscle atrophy, cyanosis, peripheral edema, moving all extremities spontaneously  Neuro: GCS 15. CNII-XII grossly intact, alert and oriented to person, place, and time. Strength and motor function grossly intact  "to all extremities, sensation intact to all extremities.  Skin/Wounds:  Small 1-2 cm wound just superior to the with sutures in place bleeding controlled, not grossly contaminated.    Labs:  Troponin:  Recent Labs     05/09/25  1055   TROPONINI <0.010     CBC:  Recent Labs     05/09/25  1055   WBC 12.44*   RBC 4.04*   HGB 12.0   HCT 37.5      MCV 92.8   MCH 29.7   MCHC 32.0*     CMP:  Recent Labs     05/09/25  1055   *   CALCIUM 8.6   ALBUMIN 3.3*   PROT 6.7      K 4.2   CO2 27      BUN 17.5   CREATININE 0.77   ALKPHOS 92   ALT 16   AST 18   BILITOT 0.5     Lactic Acid:  No results for input(s): "LACTATE" in the last 72 hours.  ETOH:  No results for input(s): "ETHANOL" in the last 72 hours.   Urine Drug Screen:  No results for input(s): "COCAINE", "OPIATE", "BARBITURATE", "AMPHETAMINE", "FENTANYL", "CANNABINOIDS", "MDMA" in the last 72 hours.    Invalid input(s): "BENZODIAZEPINE", "PHENCYCLIDINE"   ABG  No results for input(s): "PH", "PO2", "PCO2", "HCO3", "BE" in the last 168 hours.    I have reviewed all pertinent lab results within the past 24 hours.    Diagnostic Results:  Imaging Results               CT Knee Without Contrast Left (Final result)  Result time 05/09/25 12:00:03      Final result by Andrei Hooker MD (05/09/25 12:00:03)                   Narrative:    EXAMINATION  CT KNEE WITHOUT CONTRAST LEFT    CLINICAL HISTORY  surgical planning; Displaced fracture of lateral condyle of left femur, initial encounter for open fracture type I or II    TECHNIQUE  Non-contrast helical-acquisition CT images of the left knee were obtained.  Multiplanar reconstructions accomplished by a CT technologist at a separate workstation, pushed to PACS for physician review.    COMPARISON  9 May 2025 radiographs    FINDINGS  Images were reviewed in bone and soft tissue windows.    Exam quality: adequate for evaluation    As previously seen, there is a severely comminuted, displaced fracture of the " distal femoral diaphysis, extending into the diametaphyseal region and involving the anterior aspect of the medial femoral condyle and the trochlear.  The dominant fracture component involves approximately 2.2 cm posterior displacement of the femoral condyles relative to the more proximal shaft.  Additional large fracture fragments are also displaced but less severely.  There is no acutely displaced fracture involving the patella or partially visualized proximal tibia and fibula.    Extensive tricompartmental degenerative changes are present, with large marginal osteophytic projections of the femoral condyles and tibial plateau.  There is near-complete loss of the medial femorotibial joint space.  No periosteal reaction or destructive bony lesion is identified.  Small knee effusion is present, with associated fat-fluid level.    Minimal periarticular subcutaneous edema is present.  There is no expansile hematoma or drainable fluid collection.  Minimal disorganized fluid/hematoma is present through the popliteal fossa.  The visualized musculature and tendinous structures are without convincing high-grade focal injury or other suspicious abnormality within limitations of non-contrast CT assessment.    IMPRESSION  1. Markedly comminuted, displaced fracture of the distal femur, extending from the diametaphyseal region into the medial condyle and trochlear groove.  2. No other convincing acute osseous abnormality.  3. Small lipohemarthrosis.  ==========  This report was flagged in Epic as abnormal.      RADIATION DOSE  Automated tube current modulation, weight-based exposure dosing, and/or iterative reconstruction technique utilized to reach lowest reasonably achievable exposure rate.    DLP: 582 mGy*cm      Electronically signed by: Andrei Hooker  Date:    05/09/2025  Time:    12:00                                     X-Ray Femur 2 View Left (Final result)  Result time 05/09/25 12:22:43   Procedure changed from X-Ray  Knee Complete 4 or More Views Left     Final result by Andrei Hooker MD (05/09/25 12:22:43)                   Narrative:    EXAMINATION  XR FEMUR 2 VIEW LEFT    CLINICAL HISTORY  leg pain/fall; Unspecified injury of left lower leg, initial encounter    TECHNIQUE  A total of 4 images submitted of the left femur.    COMPARISON  None available at the time of initial interpretation.    FINDINGS  Severely comminuted fracture of the distal femur diametaphysis is noted, with approximately 1 shaft-width posterior displacement of the femoral condyles and patella relative to the femoral shaft.  Remaining osseous structures are without convincing acute or focal abnormality.  Visualized joints are congruent.  There are advanced degenerative alterations of the knee with near-complete loss of the medial femorotibial joint space.    The included soft tissues are without acute abnormality.    IMPRESSION  Severely comminuted, displaced distal femoral fracture.      Electronically signed by: Andrei Hooker  Date:    05/09/2025  Time:    12:22                      Wet Read by Soledad Khan DO (05/09/25 11:02:37, [facility] - Emergency Dept, Emergency Medicine)    Impacted comminuted distal femur fracture with  knee involvement                                     X-Ray Chest 1 View (Final result)  Result time 05/09/25 11:54:13      Final result by Tashi Rivera MD (05/09/25 11:54:13)                   Impression:      No acute chest disease is identified.      Electronically signed by: Tashi Rivera  Date:    05/09/2025  Time:    11:54               Narrative:    EXAMINATION:  XR CHEST 1 VIEW    CLINICAL HISTORY:  , Injury, unspecified, initial encounter.    COMPARISON:  September 1, 2023    FINDINGS:  No alveolar consolidation, effusion, or pneumothorax is seen.   The thoracic aorta is normal  cardiac silhouette, central pulmonary vessels and mediastinum are normal in size and are grossly  unremarkable.   visualized osseous structures are grossly unremarkable.                                     I have reviewed all pertinent imaging results/findings within the past 24 hours.    ASSESSMENT & PLAN:      Left distal femur fracture  Q 4 hour neuro vascular checks  OR on 05/10 with orthopedics  Obtain postop labs, she has required blood transfusions in most recent surgeries    Ground level fall  Admit to trauma surgery  NPO at midnight   mIVF @ 100  Daily labs   MM pain control  IS  Physical Therapy when able  Occupational Therapy when able  Prophylactic Lovenox 40mg BID   home med rec  tertiary exam       Froy Guerrero M.D.   [facility] General Surgery - PGY1            [1]   Social History  Tobacco Use   Smoking Status Never   Smokeless Tobacco Never   Tobacco Comments    Never   [2]   No current facility-administered medications on file prior to encounter.     Current Outpatient Medications on File Prior to Encounter   Medication Sig Dispense Refill    clonazePAM (KLONOPIN) 2 MG Tab TAKE TWO TABLETS BY MOUTH AT BEDTIME 60 tablet 3    furosemide (LASIX) 20 MG tablet Take 20 mg by mouth daily as needed.      irbesartan (AVAPRO) 75 MG tablet Take 75 mg by mouth. 1/2 tablet daily      meclizine (ANTIVERT) 12.5 mg tablet Take 12.5 mg by mouth 3 (three) times daily as needed.      protein supplement (PROTEIN ORAL) Take by mouth.

## 2025-05-10 ENCOUNTER — ANESTHESIA EVENT (OUTPATIENT)
Dept: SURGERY | Facility: HOSPITAL | Age: 70
End: 2025-05-10
Payer: MEDICARE

## 2025-05-10 ENCOUNTER — ANESTHESIA (OUTPATIENT)
Dept: SURGERY | Facility: HOSPITAL | Age: 70
End: 2025-05-10
Payer: MEDICARE

## 2025-05-10 PROBLEM — S72.402B OPEN FRACTURE OF LEFT DISTAL FEMUR: Status: ACTIVE | Noted: 2025-05-10

## 2025-05-10 LAB
ABS NEUT (OLG): NORMAL
ALBUMIN SERPL-MCNC: 2.6 G/DL (ref 3.4–4.8)
ALBUMIN/GLOB SERPL: 0.8 RATIO (ref 1.1–2)
ALP SERPL-CCNC: 102 UNIT/L (ref 40–150)
ALT SERPL-CCNC: 206 UNIT/L (ref 0–55)
ANION GAP SERPL CALC-SCNC: 10 MEQ/L
AST SERPL-CCNC: 219 UNIT/L (ref 11–45)
BILIRUB SERPL-MCNC: 0.5 MG/DL
BUN SERPL-MCNC: 11.9 MG/DL (ref 9.8–20.1)
CALCIUM SERPL-MCNC: 8.7 MG/DL (ref 8.4–10.2)
CHLORIDE SERPL-SCNC: 105 MMOL/L (ref 98–107)
CO2 SERPL-SCNC: 22 MMOL/L (ref 23–31)
CREAT SERPL-MCNC: 0.67 MG/DL (ref 0.55–1.02)
CREAT/UREA NIT SERPL: 18
ERYTHROCYTE [DISTWIDTH] IN BLOOD BY AUTOMATED COUNT: 13.2 % (ref 11.5–17)
GFR SERPLBLD CREATININE-BSD FMLA CKD-EPI: >60 ML/MIN/1.73/M2
GLOBULIN SER-MCNC: 3.3 GM/DL (ref 2.4–3.5)
GLUCOSE SERPL-MCNC: 164 MG/DL (ref 82–115)
HCT VFR BLD AUTO: 28.3 % (ref 37–47)
HGB BLD-MCNC: 9 G/DL (ref 12–16)
LACTATE SERPL-SCNC: 1.7 MMOL/L (ref 0.5–2.2)
LYMPHOCYTES NFR BLD MANUAL: 5 %
MAGNESIUM SERPL-MCNC: 2 MG/DL (ref 1.6–2.6)
MCH RBC QN AUTO: 29.9 PG (ref 27–31)
MCHC RBC AUTO-ENTMCNC: 31.8 G/DL (ref 33–36)
MCV RBC AUTO: 94 FL (ref 80–94)
MONOCYTES NFR BLD MANUAL: 2 %
NEUTROPHILS NFR BLD MANUAL: 93 %
NRBC BLD MANUAL-RTO: 1 %
OHS QRS DURATION: 68 MS
OHS QTC CALCULATION: 438 MS
PHOSPHATE SERPL-MCNC: 3.2 MG/DL (ref 2.3–4.7)
PLATELET # BLD AUTO: 209 X10(3)/MCL (ref 130–400)
PLATELET # BLD EST: NORMAL 10*3/UL
PMV BLD AUTO: 11.2 FL (ref 7.4–10.4)
POTASSIUM SERPL-SCNC: 4.4 MMOL/L (ref 3.5–5.1)
PROT SERPL-MCNC: 5.9 GM/DL (ref 5.8–7.6)
RBC # BLD AUTO: 3.01 X10(6)/MCL (ref 4.2–5.4)
RBC MORPH BLD: NORMAL
SODIUM SERPL-SCNC: 137 MMOL/L (ref 136–145)
WBC # BLD AUTO: 25.23 X10(3)/MCL (ref 4.5–11.5)

## 2025-05-10 PROCEDURE — 11012 DEB SKIN BONE AT FX SITE: CPT | Mod: 51,,, | Performed by: ORTHOPAEDIC SURGERY

## 2025-05-10 PROCEDURE — 85025 COMPLETE CBC W/AUTO DIFF WBC: CPT

## 2025-05-10 PROCEDURE — 0LQM0ZZ REPAIR LEFT UPPER LEG TENDON, OPEN APPROACH: ICD-10-PCS | Performed by: ORTHOPAEDIC SURGERY

## 2025-05-10 PROCEDURE — 94799 UNLISTED PULMONARY SVC/PX: CPT

## 2025-05-10 PROCEDURE — 27385 REPAIR OF THIGH MUSCLE: CPT | Mod: XS,51,LT, | Performed by: ORTHOPAEDIC SURGERY

## 2025-05-10 PROCEDURE — 25000003 PHARM REV CODE 250: Performed by: NURSE ANESTHETIST, CERTIFIED REGISTERED

## 2025-05-10 PROCEDURE — 37000008 HC ANESTHESIA 1ST 15 MINUTES: Performed by: ORTHOPAEDIC SURGERY

## 2025-05-10 PROCEDURE — C1769 GUIDE WIRE: HCPCS | Performed by: ORTHOPAEDIC SURGERY

## 2025-05-10 PROCEDURE — 71000033 HC RECOVERY, INTIAL HOUR: Performed by: ORTHOPAEDIC SURGERY

## 2025-05-10 PROCEDURE — 63600175 PHARM REV CODE 636 W HCPCS: Performed by: NURSE ANESTHETIST, CERTIFIED REGISTERED

## 2025-05-10 PROCEDURE — 83605 ASSAY OF LACTIC ACID: CPT | Performed by: NURSE PRACTITIONER

## 2025-05-10 PROCEDURE — 0QSC06Z REPOSITION LEFT LOWER FEMUR WITH INTRAMEDULLARY INTERNAL FIXATION DEVICE, OPEN APPROACH: ICD-10-PCS | Performed by: ORTHOPAEDIC SURGERY

## 2025-05-10 PROCEDURE — C1713 ANCHOR/SCREW BN/BN,TIS/BN: HCPCS | Performed by: ORTHOPAEDIC SURGERY

## 2025-05-10 PROCEDURE — 99900035 HC TECH TIME PER 15 MIN (STAT)

## 2025-05-10 PROCEDURE — 51798 US URINE CAPACITY MEASURE: CPT

## 2025-05-10 PROCEDURE — 36000710: Performed by: ORTHOPAEDIC SURGERY

## 2025-05-10 PROCEDURE — 21400001 HC TELEMETRY ROOM

## 2025-05-10 PROCEDURE — 36000711: Performed by: ORTHOPAEDIC SURGERY

## 2025-05-10 PROCEDURE — 63600175 PHARM REV CODE 636 W HCPCS

## 2025-05-10 PROCEDURE — 80053 COMPREHEN METABOLIC PANEL: CPT

## 2025-05-10 PROCEDURE — 63600175 PHARM REV CODE 636 W HCPCS: Mod: JZ,TB | Performed by: ANESTHESIOLOGY

## 2025-05-10 PROCEDURE — 27385 REPAIR OF THIGH MUSCLE: CPT | Mod: AS,XS,51,LT | Performed by: PHYSICIAN ASSISTANT

## 2025-05-10 PROCEDURE — 37000009 HC ANESTHESIA EA ADD 15 MINS: Performed by: ORTHOPAEDIC SURGERY

## 2025-05-10 PROCEDURE — 36415 COLL VENOUS BLD VENIPUNCTURE: CPT

## 2025-05-10 PROCEDURE — 27201423 OPTIME MED/SURG SUP & DEVICES STERILE SUPPLY: Performed by: ORTHOPAEDIC SURGERY

## 2025-05-10 PROCEDURE — 99223 1ST HOSP IP/OBS HIGH 75: CPT | Mod: 57,ICN,, | Performed by: ORTHOPAEDIC SURGERY

## 2025-05-10 PROCEDURE — 25000003 PHARM REV CODE 250: Performed by: NURSE PRACTITIONER

## 2025-05-10 PROCEDURE — 63600175 PHARM REV CODE 636 W HCPCS: Performed by: SURGERY

## 2025-05-10 PROCEDURE — D9220A PRA ANESTHESIA: Mod: CRNA,,, | Performed by: NURSE ANESTHETIST, CERTIFIED REGISTERED

## 2025-05-10 PROCEDURE — 84100 ASSAY OF PHOSPHORUS: CPT

## 2025-05-10 PROCEDURE — 27513 TREATMENT OF THIGH FRACTURE: CPT | Mod: 22,LT,, | Performed by: ORTHOPAEDIC SURGERY

## 2025-05-10 PROCEDURE — 63600175 PHARM REV CODE 636 W HCPCS: Performed by: ORTHOPAEDIC SURGERY

## 2025-05-10 PROCEDURE — D9220A PRA ANESTHESIA: Mod: ANES,,, | Performed by: ANESTHESIOLOGY

## 2025-05-10 PROCEDURE — 83735 ASSAY OF MAGNESIUM: CPT

## 2025-05-10 PROCEDURE — 27513 TREATMENT OF THIGH FRACTURE: CPT | Mod: AS,LT,, | Performed by: PHYSICIAN ASSISTANT

## 2025-05-10 PROCEDURE — 25000003 PHARM REV CODE 250: Performed by: STUDENT IN AN ORGANIZED HEALTH CARE EDUCATION/TRAINING PROGRAM

## 2025-05-10 PROCEDURE — 25000003 PHARM REV CODE 250

## 2025-05-10 DEVICE — LOCKING ATTACHMENT WASHER F/ RFNA / 5 DEG/ LEFT/ STER: Type: IMPLANTABLE DEVICE | Site: LEG | Status: FUNCTIONAL

## 2025-05-10 DEVICE — SCREW OPTILINK T25 5.0X75MM: Type: IMPLANTABLE DEVICE | Site: LEG | Status: FUNCTIONAL

## 2025-05-10 DEVICE — IMPLANTABLE DEVICE: Type: IMPLANTABLE DEVICE | Site: LEG | Status: FUNCTIONAL

## 2025-05-10 DEVICE — SCREW STRDRV VA LOK 3.5X70MM: Type: IMPLANTABLE DEVICE | Site: LEG | Status: FUNCTIONAL

## 2025-05-10 DEVICE — SCREW STRDRV VA LOK 3.5X65MM: Type: IMPLANTABLE DEVICE | Site: LEG | Status: FUNCTIONAL

## 2025-05-10 DEVICE — RFNA / 12MM / 340MM STANDARD BEND / STERILE: Type: IMPLANTABLE DEVICE | Site: LEG | Status: FUNCTIONAL

## 2025-05-10 RX ORDER — CLONAZEPAM 1 MG/1
4 TABLET ORAL NIGHTLY
Status: DISCONTINUED | OUTPATIENT
Start: 2025-05-10 | End: 2025-05-19 | Stop reason: HOSPADM

## 2025-05-10 RX ORDER — PROPOFOL 10 MG/ML
VIAL (ML) INTRAVENOUS
Status: DISCONTINUED | OUTPATIENT
Start: 2025-05-10 | End: 2025-05-10

## 2025-05-10 RX ORDER — CEFAZOLIN SODIUM 1 G/3ML
INJECTION, POWDER, FOR SOLUTION INTRAMUSCULAR; INTRAVENOUS
Status: DISCONTINUED | OUTPATIENT
Start: 2025-05-10 | End: 2025-05-10

## 2025-05-10 RX ORDER — ROCURONIUM BROMIDE 10 MG/ML
INJECTION, SOLUTION INTRAVENOUS
Status: DISCONTINUED | OUTPATIENT
Start: 2025-05-10 | End: 2025-05-10

## 2025-05-10 RX ORDER — CEFAZOLIN SODIUM 2 G/50ML
2 SOLUTION INTRAVENOUS
Status: COMPLETED | OUTPATIENT
Start: 2025-05-10 | End: 2025-05-11

## 2025-05-10 RX ORDER — FENTANYL CITRATE 50 UG/ML
INJECTION, SOLUTION INTRAMUSCULAR; INTRAVENOUS
Status: DISCONTINUED | OUTPATIENT
Start: 2025-05-10 | End: 2025-05-10

## 2025-05-10 RX ORDER — HALOPERIDOL LACTATE 5 MG/ML
0.5 INJECTION, SOLUTION INTRAMUSCULAR EVERY 10 MIN PRN
Status: DISCONTINUED | OUTPATIENT
Start: 2025-05-10 | End: 2025-05-13

## 2025-05-10 RX ORDER — CEFAZOLIN 2 G/1
2 INJECTION, POWDER, FOR SOLUTION INTRAMUSCULAR; INTRAVENOUS
Status: DISCONTINUED | OUTPATIENT
Start: 2025-05-10 | End: 2025-05-10

## 2025-05-10 RX ORDER — DEXAMETHASONE SODIUM PHOSPHATE 4 MG/ML
INJECTION, SOLUTION INTRA-ARTICULAR; INTRALESIONAL; INTRAMUSCULAR; INTRAVENOUS; SOFT TISSUE
Status: DISCONTINUED | OUTPATIENT
Start: 2025-05-10 | End: 2025-05-10

## 2025-05-10 RX ORDER — VANCOMYCIN HYDROCHLORIDE 1 G/20ML
INJECTION, POWDER, LYOPHILIZED, FOR SOLUTION INTRAVENOUS
Status: DISCONTINUED | OUTPATIENT
Start: 2025-05-10 | End: 2025-05-10 | Stop reason: HOSPADM

## 2025-05-10 RX ORDER — MIDAZOLAM HYDROCHLORIDE 1 MG/ML
INJECTION INTRAMUSCULAR; INTRAVENOUS
Status: DISCONTINUED | OUTPATIENT
Start: 2025-05-10 | End: 2025-05-10

## 2025-05-10 RX ORDER — ONDANSETRON HYDROCHLORIDE 2 MG/ML
INJECTION, SOLUTION INTRAVENOUS
Status: DISCONTINUED | OUTPATIENT
Start: 2025-05-10 | End: 2025-05-10

## 2025-05-10 RX ORDER — KETOROLAC TROMETHAMINE 30 MG/ML
15 INJECTION, SOLUTION INTRAMUSCULAR; INTRAVENOUS EVERY 8 HOURS PRN
Status: DISCONTINUED | OUTPATIENT
Start: 2025-05-10 | End: 2025-05-11

## 2025-05-10 RX ORDER — PHENYLEPHRINE HYDROCHLORIDE 10 MG/ML
INJECTION INTRAVENOUS
Status: DISCONTINUED | OUTPATIENT
Start: 2025-05-10 | End: 2025-05-10

## 2025-05-10 RX ORDER — HYDROMORPHONE HYDROCHLORIDE 2 MG/ML
INJECTION, SOLUTION INTRAMUSCULAR; INTRAVENOUS; SUBCUTANEOUS
Status: DISCONTINUED | OUTPATIENT
Start: 2025-05-10 | End: 2025-05-10

## 2025-05-10 RX ORDER — HYDRALAZINE HYDROCHLORIDE 20 MG/ML
10 INJECTION INTRAMUSCULAR; INTRAVENOUS ONCE
Status: DISCONTINUED | OUTPATIENT
Start: 2025-05-10 | End: 2025-05-11

## 2025-05-10 RX ORDER — HYDROMORPHONE HYDROCHLORIDE 2 MG/ML
0.2 INJECTION, SOLUTION INTRAMUSCULAR; INTRAVENOUS; SUBCUTANEOUS EVERY 5 MIN PRN
Status: DISCONTINUED | OUTPATIENT
Start: 2025-05-10 | End: 2025-05-11

## 2025-05-10 RX ORDER — MIDAZOLAM HYDROCHLORIDE 1 MG/ML
2 INJECTION INTRAMUSCULAR; INTRAVENOUS ONCE
Status: DISCONTINUED | OUTPATIENT
Start: 2025-05-10 | End: 2025-05-11

## 2025-05-10 RX ORDER — FUROSEMIDE 20 MG/1
20 TABLET ORAL DAILY PRN
Status: DISCONTINUED | OUTPATIENT
Start: 2025-05-10 | End: 2025-05-19 | Stop reason: HOSPADM

## 2025-05-10 RX ORDER — CALCIUM CHLORIDE INJECTION 100 MG/ML
INJECTION, SOLUTION INTRAVENOUS
Status: DISCONTINUED | OUTPATIENT
Start: 2025-05-10 | End: 2025-05-10

## 2025-05-10 RX ORDER — MECLIZINE HCL 12.5 MG 12.5 MG/1
12.5 TABLET ORAL 3 TIMES DAILY PRN
Status: DISCONTINUED | OUTPATIENT
Start: 2025-05-10 | End: 2025-05-19 | Stop reason: HOSPADM

## 2025-05-10 RX ORDER — GLUCAGON 1 MG
1 KIT INJECTION
Status: DISCONTINUED | OUTPATIENT
Start: 2025-05-10 | End: 2025-05-13

## 2025-05-10 RX ADMIN — SODIUM CHLORIDE, SODIUM GLUCONATE, SODIUM ACETATE, POTASSIUM CHLORIDE AND MAGNESIUM CHLORIDE: 526; 502; 368; 37; 30 INJECTION, SOLUTION INTRAVENOUS at 08:05

## 2025-05-10 RX ADMIN — CALCIUM CHLORIDE INJECTION 0.5 G: 100 INJECTION, SOLUTION INTRAVENOUS at 08:05

## 2025-05-10 RX ADMIN — ACETAMINOPHEN 325MG 650 MG: 325 TABLET ORAL at 06:05

## 2025-05-10 RX ADMIN — PROPOFOL 150 MG: 10 INJECTION, EMULSION INTRAVENOUS at 07:05

## 2025-05-10 RX ADMIN — SUGAMMADEX 200 MG: 100 INJECTION, SOLUTION INTRAVENOUS at 08:05

## 2025-05-10 RX ADMIN — METHOCARBAMOL 500 MG: 500 TABLET ORAL at 09:05

## 2025-05-10 RX ADMIN — CEFTRIAXONE SODIUM 2 G: 2 INJECTION, POWDER, FOR SOLUTION INTRAMUSCULAR; INTRAVENOUS at 04:05

## 2025-05-10 RX ADMIN — PHENYLEPHRINE HYDROCHLORIDE 200 MCG: 10 INJECTION INTRAVENOUS at 08:05

## 2025-05-10 RX ADMIN — CEFAZOLIN 2 G: 330 INJECTION, POWDER, FOR SOLUTION INTRAMUSCULAR; INTRAVENOUS at 07:05

## 2025-05-10 RX ADMIN — METHOCARBAMOL 500 MG: 500 TABLET ORAL at 02:05

## 2025-05-10 RX ADMIN — HYDROMORPHONE HYDROCHLORIDE 0.5 MG: 2 INJECTION, SOLUTION INTRAMUSCULAR; INTRAVENOUS; SUBCUTANEOUS at 08:05

## 2025-05-10 RX ADMIN — DEXAMETHASONE SODIUM PHOSPHATE 4 MG: 4 INJECTION, SOLUTION INTRA-ARTICULAR; INTRALESIONAL; INTRAMUSCULAR; INTRAVENOUS; SOFT TISSUE at 07:05

## 2025-05-10 RX ADMIN — PHENYLEPHRINE HYDROCHLORIDE 200 MCG: 10 INJECTION INTRAVENOUS at 07:05

## 2025-05-10 RX ADMIN — SODIUM CHLORIDE, SODIUM GLUCONATE, SODIUM ACETATE, POTASSIUM CHLORIDE AND MAGNESIUM CHLORIDE: 526; 502; 368; 37; 30 INJECTION, SOLUTION INTRAVENOUS at 07:05

## 2025-05-10 RX ADMIN — KETOROLAC TROMETHAMINE 15 MG: 30 INJECTION, SOLUTION INTRAMUSCULAR; INTRAVENOUS at 09:05

## 2025-05-10 RX ADMIN — CLONAZEPAM 4 MG: 1 TABLET ORAL at 09:05

## 2025-05-10 RX ADMIN — FENTANYL CITRATE 100 MCG: 50 INJECTION, SOLUTION INTRAMUSCULAR; INTRAVENOUS at 07:05

## 2025-05-10 RX ADMIN — CEFAZOLIN SODIUM 2 G: 2 SOLUTION INTRAVENOUS at 04:05

## 2025-05-10 RX ADMIN — PHENAZOPYRIDINE HYDROCHLORIDE 100 MG: 100 TABLET ORAL at 12:05

## 2025-05-10 RX ADMIN — MIDAZOLAM HYDROCHLORIDE 2 MG: 1 INJECTION, SOLUTION INTRAMUSCULAR; INTRAVENOUS at 07:05

## 2025-05-10 RX ADMIN — ONDANSETRON 4 MG: 2 INJECTION INTRAMUSCULAR; INTRAVENOUS at 07:05

## 2025-05-10 RX ADMIN — DOCUSATE SODIUM 100 MG: 100 CAPSULE, LIQUID FILLED ORAL at 09:05

## 2025-05-10 RX ADMIN — PHENAZOPYRIDINE HYDROCHLORIDE 100 MG: 100 TABLET ORAL at 05:05

## 2025-05-10 RX ADMIN — ACETAMINOPHEN 325MG 650 MG: 325 TABLET ORAL at 02:05

## 2025-05-10 RX ADMIN — HYDROMORPHONE HYDROCHLORIDE 1 MG: 2 INJECTION, SOLUTION INTRAMUSCULAR; INTRAVENOUS; SUBCUTANEOUS at 07:05

## 2025-05-10 RX ADMIN — ROCURONIUM BROMIDE 50 MG: 10 SOLUTION INTRAVENOUS at 07:05

## 2025-05-10 NOTE — CONSULTS
"[facility]  Orthopedic Trauma  Consult Note    Patient Name: Varsha Rg  MRN: [id number]  Admission Date: 5/9/2025  Hospital Length of Stay: 1 days  Attending Provider: Joradn Wheatley MD  Primary Care Provider: Amrit Vogt II, MD        Inpatient consult to Orthopedic Surgery  Consult performed by: Brent Purcell DO  Consult ordered by: Froy Guerrero MD        Subjective:         Chief Complaint:   Chief Complaint   Patient presents with    Fall     Slip and fall outside just PTA. States her "leg went one way and my ankle went another". Also hit her head. Denies LOC and denies blood thinners. Reports of pain to left knee radiating down left leg to ankle with abrasions     Transfer     Tx from Ortho for femur fx.         HPI:  Patient is a transfer from orthopedic hospital for left distal femur fracture with severe comminution this is an open fracture.  She has been NPO over midnight.  In the orthopedic trauma room today.  She is a community ambulator with the assist.  She states she needed a total knee are replacement    Past Medical History:   Diagnosis Date    Allergy ?    Years ago    Anxiety and depression 01/11/2024    Arthritis     Benign paroxysmal vertigo, bilateral     Chronic back pain     Clotting disorder 1975    BLOOD CLOTS IN LEGS    Concussion     Deep vein thrombosis 1974    9 day hospital stay both legs    Degenerative disc disease ?    DVT (deep venous thrombosis)     Encounter for blood transfusion 2017. And 2022    2 pints for each surgery    Fibromyalgia     GERD (gastroesophageal reflux disease)     History of syncope     Hypercholesteremia     Hypertension     IBS (irritable bowel syndrome)     Lymphedema     Osteoporosis     Personal history of colonic polyps 08/04/2015    Skin cancer     SOB (shortness of breath)     Systemic lupus erythematosus, organ or system involvement unspecified     Thyroid disease     Ulcer 1998    Unspecified cataract  "    Unspecified osteoarthritis, unspecified site        Past Surgical History:   Procedure Laterality Date    ADENOIDECTOMY      Can't remember    APPENDECTOMY      CARPAL TUNNEL RELEASE      CATARACT EXTRACTION Right 2022    CATARACT EXTRACTION Left 2022     SECTION      CHOLECYSTECTOMY      COLONOSCOPY  2015    EXCISION OF SQUAMOUS CELL CARCINOMA      EYE SURGERY      FOOT FRACTURE SURGERY      GASTRIC BYPASS      Sleeve    HIATAL HERNIA REPAIR  2022    Dr. gould    HYSTERECTOMY      JOINT REPLACEMENT  2017    Knee    KNEE SURGERY      LAPAROSCOPIC SLEEVE GASTRECTOMY  2022    Dr. Gould    RECONSTRUCTION OF SHOULDER      TONSILLECTOMY      TRIGGER FINGER RELEASE      WRIST FRACTURE SURGERY         Review of patient's allergies indicates:   Allergen Reactions    Azithromycin Shortness Of Breath    Hydrocodone-acetaminophen Hallucinations    Pcn [penicillins]     Rosuvastatin     Levofloxacin Palpitations       Current Facility-Administered Medications   Medication    acetaminophen tablet 650 mg    cefTRIAXone injection 2 g    docusate sodium capsule 100 mg    enoxaparin injection 40 mg    gabapentin capsule 300 mg    lactated ringers infusion    magnesium hydroxide 400 mg/5 ml suspension 2,400 mg    melatonin tablet 6 mg    methocarbamoL tablet 500 mg    oxyCODONE immediate release tablet 5 mg    oxyCODONE immediate release tablet Tab 10 mg    phenazopyridine tablet 100 mg    polyethylene glycol packet 17 g     Family History       Problem Relation (Age of Onset)    Cancer Brother    Depression Sister    Heart disease Father    Hypertension Mother    Stroke Mother          Tobacco Use    Smoking status: Never    Smokeless tobacco: Never    Tobacco comments:     Never   Substance and Sexual Activity    Alcohol use: Not Currently     Comment: None in 3 yrs    Drug use: Never    Sexual activity: Not Currently     Partners: Male     Birth control/protection: Condom,  Diaphragm, I.U.D., Post-menopausal       ROS:  Constitutional: Denies fever chills  Eyes: No change in vision  ENT: No ringing or current infections  CV: No chest pain  Resp: No labored breathing  MSK: Pain evident at site of injury located in HPI,   Integ: No signs of abrasions or lacerations  Neuro: No numbness or tingling  Lymphatic: No swelling outside the area of injury   Objective:     Vital Signs (Most Recent):  Temp: 97.7 °F (36.5 °C) (05/10/25 0407)  Pulse: 105 (05/10/25 0623)  Resp: 17 (05/10/25 0623)  BP: (!) 141/81 (05/10/25 0623)  SpO2: 100 % (RA) (05/10/25 0623) Vital Signs (24h Range):  Temp:  [97.7 °F (36.5 °C)-98.4 °F (36.9 °C)] 97.7 °F (36.5 °C)  Pulse:  [] 105  Resp:  [9-22] 17  SpO2:  [90 %-100 %] 100 %  BP: (107-145)/(51-86) 141/81     Weight: 79.4 kg (175 lb)  Height: 5' (152.4 cm)  Body mass index is 34.18 kg/m².      Intake/Output Summary (Last 24 hours) at 5/10/2025 0634  Last data filed at 5/10/2025 0300  Gross per 24 hour   Intake --   Output 350 ml   Net -350 ml       Ortho/SPM Exam  General the patient is alert and oriented x3 no acute distress nontoxic-appearing appropriate affect.    Constitutional: Vital signs are examined and stable.  Resp: No signs of labored breathing                 LLE: -Skin:  Dressing intact without signs of oozing today.           -MSK: EHL/FHL, Gastroc/Tib anterior Strength 5/5           -Neuro:  Sensation intact to light touch L3-S1 dermatomes           -Lymphatic: No signs of lymphadenopathy           -CV: Capillary refill is less than 2 seconds. DP/PT pulses 2/4. Compartments soft and compressible      Significant Labs:  I have reviewed all labs in relation to Orthopedics  Recent Lab Results  (Last 5 results in the past 72 hours)        05/09/25 2207   05/09/25  1702   05/09/25  1605   05/09/25  1150   05/09/25  1055        Albumin/Globulin Ratio         1.0       Albumin         3.3       ALP         92       ALT         16       Anion Gap          8.0       AST         18       Baso #         0.08       Basophil %         0.6       BILIRUBIN TOTAL         0.5       BUN         17.5       BUN/CREAT RATIO         23       Calcium         8.6       Chloride         107       CO2         27       Creatinine         0.77       eGFR         >60  Comment: Estimated GFR calculated using the CKD-EPI creatinine (2021) equation.       Eos #         0.08       Eos %         0.6       Globulin, Total         3.4       Glucose         122       Group & Rh       A POS         Hematocrit         37.5       Hemoglobin         12.0       Immature Grans (Abs)         0.07       Immature Granulocytes         0.6       Indirect Kathe GEL       NEG         INR     1.0           Lactic Acid Level 2.5     2.1           Lymph #         2.99       LYMPH %         24.0       MCH         29.7       MCHC         32.0       MCV         92.8       Mono #         0.82       Mono %         6.6       MPV         11.2       Neut #         8.40       Neut %         67.6       nRBC         0.0       QRS Duration   68             [facility] QTC Calculation   438             Platelet Count         222       Potassium         4.2       PROTEIN TOTAL         6.7       PT     13.4           RBC         4.04       RDW         12.9       Sodium         142       Specimen Outdate       05/12/2025 23:59         Troponin I         <0.010       WBC         12.44                              Significant Imaging: I have reviewed all pertinent imaging results/findings.  X-Ray Femur 2 View Left  Result Date: 5/9/2025  EXAMINATION XR FEMUR 2 VIEW LEFT CLINICAL HISTORY leg pain/fall; Unspecified injury of left lower leg, initial encounter TECHNIQUE A total of 4 images submitted of the left femur. COMPARISON None available at the time of initial interpretation. FINDINGS Severely comminuted fracture of the distal femur diametaphysis is noted, with approximately 1 shaft-width posterior displacement of the femoral  condyles and patella relative to the femoral shaft.  Remaining osseous structures are without convincing acute or focal abnormality.  Visualized joints are congruent.  There are advanced degenerative alterations of the knee with near-complete loss of the medial femorotibial joint space. The included soft tissues are without acute abnormality. IMPRESSION Severely comminuted, displaced distal femoral fracture. Electronically signed by: Andrei Hooker Date:    05/09/2025 Time:    12:22    CT Knee Without Contrast Left  Result Date: 5/9/2025  EXAMINATION CT KNEE WITHOUT CONTRAST LEFT CLINICAL HISTORY surgical planning; Displaced fracture of lateral condyle of left femur, initial encounter for open fracture type I or II TECHNIQUE Non-contrast helical-acquisition CT images of the left knee were obtained.  Multiplanar reconstructions accomplished by a CT technologist at a separate workstation, pushed to PACS for physician review. COMPARISON 9 May 2025 radiographs FINDINGS Images were reviewed in bone and soft tissue windows. Exam quality: adequate for evaluation As previously seen, there is a severely comminuted, displaced fracture of the distal femoral diaphysis, extending into the diametaphyseal region and involving the anterior aspect of the medial femoral condyle and the trochlear.  The dominant fracture component involves approximately 2.2 cm posterior displacement of the femoral condyles relative to the more proximal shaft.  Additional large fracture fragments are also displaced but less severely.  There is no acutely displaced fracture involving the patella or partially visualized proximal tibia and fibula. Extensive tricompartmental degenerative changes are present, with large marginal osteophytic projections of the femoral condyles and tibial plateau.  There is near-complete loss of the medial femorotibial joint space.  No periosteal reaction or destructive bony lesion is identified.  Small knee effusion is present,  with associated fat-fluid level. Minimal periarticular subcutaneous edema is present.  There is no expansile hematoma or drainable fluid collection.  Minimal disorganized fluid/hematoma is present through the popliteal fossa.  The visualized musculature and tendinous structures are without convincing high-grade focal injury or other suspicious abnormality within limitations of non-contrast CT assessment. IMPRESSION 1. Markedly comminuted, displaced fracture of the distal femur, extending from the diametaphyseal region into the medial condyle and trochlear groove. 2. No other convincing acute osseous abnormality. 3. Small lipohemarthrosis. ========== This report was flagged in Epic as abnormal. RADIATION DOSE Automated tube current modulation, weight-based exposure dosing, and/or iterative reconstruction technique utilized to reach lowest reasonably achievable exposure rate. DLP: 582 mGy*cm Electronically signed by: Andrei Hooker Date:    05/09/2025 Time:    12:00    X-Ray Chest 1 View  Result Date: 5/9/2025  EXAMINATION: XR CHEST 1 VIEW CLINICAL HISTORY: , Injury, unspecified, initial encounter. COMPARISON: September 1, 2023 FINDINGS: No alveolar consolidation, effusion, or pneumothorax is seen.   The thoracic aorta is normal  cardiac silhouette, central pulmonary vessels and mediastinum are normal in size and are grossly unremarkable.   visualized osseous structures are grossly unremarkable.     No acute chest disease is identified. Electronically signed by: Tashi Rivera Date:    05/09/2025 Time:    11:54       Assessment/Plan:     Active Diagnoses:    Diagnosis Date Noted POA    PRINCIPAL PROBLEM:  Open fracture of left distal femur [S72.402B] 05/10/2025 Yes      Problems Resolved During this Admission:       Independent Radiology ordered by other provider:   CT scan of the left knee and two views left knee showing a severely comminuted intra-articular distal femur fracture with intercondylar extension.   Significant comminution small distal articular segment.    Pt has acute injury with risk of severe bodily function with their injury to the left knee.     -Risks included with this type of injury large arthroplasty, amputation setting of deep infection    Patient is a 70-year-old female has a open distal femur fracture with intercondylar extension significant comminution shoes washed in loosely closed at the outside hospital.  She appears to have a quad tendon likely puncture at very least if not tear.  The disc the proximal femur appears to be imbedded in her extensor mechanism.  She appears to be comfortable right now.  She has been receiving antibiotics.  We will take her to the OR today for washout and fixation.  She is at risk for complications due to the severity of injury    I explained that surgery and the nature of their condition are not without risks. These include, but are not limited to, bleeding, infection, neurovascular compromise, malunion, nonunion, hardware complications, wound complications, scarring, cosmetic defects, need for later and/or repeated surgeries, avascular necrosis, bone death due to initial trauma, pain, loss of ROM, loss of function, PTOA, deformity, stance/gait and/or functional abnormalities, thromboembolic complications, compartment syndrome, loss of limb, loss of life, anesthetic complications, and other imponderables. I explained that these can occur despite the adequacy of treatments rendered, and that their risks are heightened given the nature of their condition.  I have also discussed the importance not using nicotine products due to the increased risk of infection surgical wound healing complications and nonunion of the fracture.  They verbalized understanding.  No guarantees were made.  They would like to continue with surgery at this time. If appropriate family was involved with surgical discussion.             This note/OR report was created with the assistance of   voice recognition software or phone  dictation.  There may be transcription errors as a result of using this technology however minimal. Effort has been made to assure accuracy of transcription but any obvious errors or omissions should be clarified with the author of the document.       Brent Purcell DO   Orthopedic Trauma Surgery  [facility]

## 2025-05-10 NOTE — ANESTHESIA POSTPROCEDURE EVALUATION
Anesthesia Post Evaluation    Patient: Varsha Rg    Procedure(s) Performed: Procedure(s) (LRB):  INSERTION, INTRAMEDULLARY STANISLAW, FEMUR, DISTAL, RETROGRADE (Left)  ORIF, FRACTURE, FEMUR, TRANSCONDYLAR WITH INTERCONDYLAR EXTENSION (Left)  REPAIR,MUSCLE,QUADRICEPS OR HAMSTRING (Left)  DEBRIDEMENT, SKIN, FASCIA, MUSCLE, AND BONE, OPEN FRACTURE SITE (Left)    Final Anesthesia Type: general      Patient location during evaluation: PACU  Patient participation: Yes- Able to Participate  Level of consciousness: awake and alert and oriented  Post-procedure vital signs: reviewed and stable  Pain management: adequate  Airway patency: patent  MIKEY mitigation strategies: Postoperative administration of CPAP, nasopharyngeal airway, or oral appliance in the postanesthesia care unit (PACU)  PONV status at discharge: No PONV  Anesthetic complications: no      Cardiovascular status: hemodynamically stable, hypertensive, tachycardic and blood pressure returned to baseline  Respiratory status: unassisted, spontaneous ventilation and room air  Hydration status: euvolemic  Follow-up not needed.              Vitals Value Taken Time   /62 05/10/25 09:41   Temp 37.3 05/10/25 11:28   Pulse 101 05/10/25 09:50   Resp 18 05/10/25 09:50   SpO2 97 % 05/10/25 09:46   Vitals shown include unfiled device data.      Event Time   Out of Recovery 05/10/2025 09:50:44         Pain/Ger Score: Pain Rating Prior to Med Admin: 8 (5/10/2025  9:31 AM)  Pain Rating Post Med Admin: 3 (5/9/2025 10:44 PM)  Ger Score: 9 (5/10/2025  9:50 AM)

## 2025-05-10 NOTE — OP NOTE
OPERATIVE REPORT    Patient: Varsha Rg   : 1955    MRN: [id number]  Date: 05/10/2025      Surgeon:Brent Purcell DO  Assistant: Dylan Mcdonald was essential, part of the procedure including deep hardware placement and deep closure.  No senior assistant was availible  Preoperative Diagnosis: Left open severely comminuted distal femur fracture with intra-articular extension, severe morbid obesity  Postoperative Diagnosis: Same  Procedure:    1) Open reduction and internal fixation left distal femur fracture with intracondylar split - 46730  2) left knee quad tendon repair-CPT 53161  3) excisional debridement open fracture left knee-CPT 04497  Anesthesiologist: Wood Mcduffie MD  OR Staff: Circulator: Kami Hernandez RN  Radiology Technologist: Cookie Perez RT  Scrub Person: Liliya Samson ST  Implants:   Implant Name Type Inv. Item Serial No.  Lot No. LRB No. Used Action   STANISLAW REAM BALL TP [id number] - [id number]  STANISLAW REAM BALL TP [id number]  [employer] [id number] Left 1 Implanted and Explanted   synthes RFN-advanced polymer inlay    SYNTHES [id number] Left 1 Implanted   WASHER [employer] LEFT 5 DEG - [id number]  WASHER [employer] LEFT 5 DEG  [employer] [id number] Left 1 Implanted   NAIL IM NAIL RFNA 5D [id number] - [id number]  NAIL IM NAIL RFNA 5D [id number]  SYNTHES [id number] Left 1 Implanted   synthes 5mm locking screw for IM nail with XL25 recess, locking screw 36mm    SYNTHES  Left 1 Implanted and Explanted   synthes 5mm locking screw for IM nail with XL25 recess, locking screw 38mm    SYNTHES  Left 1 Implanted   WIRE GUIDE 3.2MM 400MM - [id number]  WIRE GUIDE 3.2MM 400MM  SYNTHES  Left 3 Implanted and Explanted     EBL: 250cc  Complications: None  Disposition: To PACU, stable    Indications: Varsha Rg is a 70 y.o. female presenting with the aforementioned injuries/findings. The procedure is indicated to best obtain stability about the distal femur.  The  patient is awake and alert. After thorough discussion of the risks, benefits, expected outcomes, and alternatives to surgical intervention, the patient agreed to proceed with surgical treatment.  Specific risks discussed included, but were not limited to: superficial or deep infection, wound healing complications, DVT/PE, significant bleeding requiring transfusion, damage to named anatomic structures in the immediate area including named neurovascular structures, infection, malunion, nonunion, pain, arthritic changes, and general risks of anesthesia.  The patient voiced understanding and written as well as verbal consent was obtained by myself prior to the procedure.    Patient has a severe injury to the left leg she is also morbidly obese and carries a lot of weight in her left leg large amount of adipose tissue she has a quad tendon disruption due to her femoral fragment and an open fracture.  It is a very severe injury.    Procedure Note:  The patient was brought back to the OR and placed supine on the OR table. After successful induction of anesthesia by anesthesia staff, the patient was positioned in the supine position and all bony prominences were padded appropriately.  The surgical field was then provisionally cleansed and then prepped and draped in the usual sterile fashion.    At this time a time-out was performed, with the correct patient, site, and procedure identified.  The universal time out as well as sign your site protocols were followed.  Preoperative antibiotics were verified as administered.    Attention is drawn to the left leg patient has significant amount of adipose tissue around her leg which makes surgery very complex and difficult.  Retractors not normally use for this procedure were utilized for deep retraction.  Patient has a poke hole open over the anterior aspect of the knee I removed the sutures and extended this proximally and distally with the inferior incision that is created a  midline approach to the knee patient has a disruption of the quad tendon and exposure of the proximal femoral fragment.  I excised the proximal femoral fragment to decrease risk of infection I did this with a rongeur and removed the bone and necrotic tissue in the area we then exposed the quad tendon laceration extended it proximally and distally to visualize the fracture and copiously irrigated the fracture.    Attention was now drawn to the quad tendon we are able to repair that with a 1. Vicryl suture and vancomycin.      Using a combination of traction manual manipulation and a triangle we are able to reduced the fracture fragments and placed a K-wire across the intercondylar split.  I then utilizing the midline incision completed a infrapatellar arthrotomy and ex and accessed the arthritic joint.  Placed a K-wire in the intercondylar notch we then opening reamed reduced the fracture placed a retrograde Synthes nail under intraoperative fluoroscopy satisfied length alignment rotation we then locked rotation without oblique screw.  I then made a lateral incision through the IT band through a 4 cm of adipose tissue and placed a LAW plate.  Using variable locking screws and single lateral screw through the nail we are able to capture the distal intra-articular fragment.  We then used perfect [place] technique again dissected through 6 cm of adipose tissue to place 2 proximal screws.      Final C-arm images confirmed excellent alignment and reduction of the fracture.  All hardware was in good position.  The leg clinically appeared to have normal rotational alignment as compared to exam of the opposite side.     Fluoroscopic evaluation of the hip after the nail was placed showed no femoral neck injury.  In addition, exam of the knee after nail placement showed no signs of ligamentous laxity.    The incision(s) was/were then irrigated using copious sterile saline and then closed in layered fashion with an addition of  vanco into the wounds.  The leg was sterilely cleansed and dressed.    The patient was then subsequently transferred to to PACU in a stable condition.    All sponge and needle counts were correct at the end of the case.  I was present and participated in all aspects of the procedure.    Prognosis:  The patient will be kept NWB on the ipsilateral extremity 10 weeks.  DVT prophylaxis is indicated for this patient and procedure. Patient may need return to the OR for excisional debridement or washout if infection/skin necrosis is observed.      This note/OR report was created with the assistance of  voice recognition software or phone  dictation.  There may be transcription errors as a result of using this technology however minimal. Effort has been made to assure accuracy of transcription but any obvious errors or omissions should be clarified with the author of the document.       Brent Purcell,   Orthopedic Trauma Surgery

## 2025-05-10 NOTE — ANESTHESIA PROCEDURE NOTES
Intubation    Date/Time: 5/10/2025 7:17 AM    Performed by: Logan Couch CRNA  Authorized by: Wood Mcduffie MD    Intubation:     Induction:  Intravenous    Intubated:  Postinduction    Mask Ventilation:  Easy mask    Attempts:  1    Attempted By:  CRNA    Method of Intubation:  Direct    Blade:  Glidescope 3    Laryngeal View Grade: Grade I - full view of cords      Difficult Airway Encountered?: No      Complications:  None    Airway Device:  Oral endotracheal tube    Airway Device Size:  7.5    Style/Cuff Inflation:  Cuffed (inflated to minimal occlusive pressure)    Inflation Amount (mL):  6    Tube secured:  21    Secured at:  The lips    Placement Verified By:  Capnometry    Complicating Factors:  None    Findings Post-Intubation:  BS equal bilateral and atraumatic/condition of teeth unchanged

## 2025-05-10 NOTE — ANESTHESIA PREPROCEDURE EVALUATION
05/10/2025  Varsha Rg is a 70 y.o., female transfer from orthopedic hospital for left distal femur fracture with severe comminution this is an open fracture. She has been NPO over midnight. In the orthopedic trauma room today. She is a community ambulator with the assist. She states she needed a total knee are replacement.     She has a open distal femur fracture with intercondylar extension significant comminution shoes washed in loosely closed at the outside hospital. She appears to have a quad tendon likely puncture at very least if not tear. The disc the proximal femur appears to be imbedded in her extensor mechanism. She appears to be comfortable right now. She has been receiving antibiotics. We will take her to the OR today for washout and fixation. She is at risk for complications due to the severity of injury     Height: 5' (1.524 m) (05/09/25)   Weight: 79.4 kg (175 lb) (05/09/25)   BMI: 34.2 (05/09/25)   NPO Status: 2359   Allergies: AZITHROMYCIN, HYDROCODONE-ACETAMINOPHEN, PCN [PENICILLINS], ROSUVASTATIN, LEVOFLOXACIN     Pre Vitals  Current as of 05/10/25 0648  BP: 141/81 Pulse: 105   Resp: 17 SpO2: 100   Temp:     Chronic back pain    Other Medical History   Hypertension Systemic lupus erythematosus, organ or system involvement unspecified   Skin cancer Fibromyalgia   Concussion Benign paroxysmal vertigo, bilateral   Unspecified cataract SOB (shortness of breath)   Personal history of colonic polyps Arthritis   GERD (gastroesophageal reflux disease) DVT (deep venous thrombosis)   History of syncope Hypercholesteremia   Thyroid disease IBS (irritable bowel syndrome)   Lymphedema Unspecified osteoarthritis, unspecified site   Osteoporosis Anxiety and depression   Allergy Deep vein thrombosis   Encounter for blood transfusion Degenerative disc disease   Clotting disorder Ulcer   Surgical  History    APPENDECTOMY HYSTERECTOMY   CHOLECYSTECTOMY KNEE SURGERY   WRIST FRACTURE SURGERY CATARACT EXTRACTION   CATARACT EXTRACTION COLONOSCOPY   CARPAL TUNNEL RELEASE  SECTION   EXCISION OF SQUAMOUS CELL CARCINOMA FOOT FRACTURE SURGERY   LAPAROSCOPIC SLEEVE GASTRECTOMY HIATAL HERNIA REPAIR   TRIGGER FINGER RELEASE RECONSTRUCTION OF SHOULDER   TONSILLECTOMY ADENOIDECTOMY   EYE SURGERY GASTRIC BYPASS   JOINT REPLACEMEN      Prescription Medications  Within last 14 days from 05/10/25   Last Taken Last Updated   clonazePAM (KLONOPIN) 2 MG Tab        furosemide (LASIX) 20 MG tablet    Taking 24 1326   irbesartan (AVAPRO) 75 MG tablet    Taking 24 1326   meclizine (ANTIVERT) 12.5 mg tablet    Taking 24 1326   protein supplement (PROTEIN ORAL)    Taking 24 1326         Latest Reference Range & Units 25 10:55   WBC 4.50 - 11.50 x10(3)/mcL 12.44 (H)   RBC 4.20 - 5.40 x10(6)/mcL 4.04 (L)   Hemoglobin 12.0 - 16.0 g/dL 12.0   Hematocrit 37.0 - 47.0 % 37.5   Platelet Count 130 - 400 x10(3)/mcL 222   Sodium 136 - 145 mmol/L 142   Potassium 3.5 - 5.1 mmol/L 4.2   Chloride 98 - 107 mmol/L 107   CO2 23 - 31 mmol/L 27   Anion Gap mEq/L 8.0   BUN 9.8 - 20.1 mg/dL 17.5   Creatinine 0.55 - 1.02 mg/dL 0.77   BUN/CREAT RATIO  23   eGFR mL/min/1.73/m2 >60   Glucose 82 - 115 mg/dL 122 (H)   Calcium 8.4 - 10.2 mg/dL 8.6   EKG 25   Sinus rhythm with occasional Premature ventricular complexes   Anterior infarct ,age undetermined   Abnormal ECG     Pre-op Assessment    I have reviewed the Patient Summary Reports.     I have reviewed the Nursing Notes. I have reviewed the NPO Status.   I have reviewed the Medications.     Review of Systems  Anesthesia Hx:  No problems with previous Anesthesia   History of prior surgery of interest to airway management or planning: gastric bypass. Previous anesthesia: General, MAC, Nerve Block        Denies Family Hx of Anesthesia complications.    Denies Personal Hx  of Anesthesia complications.                    Social:  Non-Smoker, No Alcohol Use       Hematology/Oncology:    Oncology Normal    -- Denies Anemia:                                  EENT/Dental:  EENT/Dental Normal           Cardiovascular:  Exercise tolerance: good   Hypertension, well controlled       Denies Angina.        ECG has been reviewed.         Shortness of Breath                    Hypertension         Pulmonary:     Denies Asthma.  Shortness of breath   Denies Sleep Apnea.                Renal/:   Denies Chronic Renal Disease.                Hepatic/GI:     GERD, well controlled    Not Taking GLP-1 Agonists     Gerd          Musculoskeletal:  Arthritis        Arthritis          Neurological:    Denies CVA. Neuromuscular Disease,           Arthritis                         Neuromuscular Disease   Endocrine:  Diabetes, well controlled    Diabetes                    Obesity / BMI > 30  Dermatological:  Skin Normal    Psych:  Psychiatric History                  Physical Exam  General: Well nourished, Cooperative, Alert and Oriented    Airway:  Mallampati: III / II  Mouth Opening: Small, but > 3cm  TM Distance: 4 - 6 cm  Tongue: Normal  Neck ROM: Extension Decreased    Dental:  Intact  Permanent Lower Dentures    Chest/Lungs:  Clear to auscultation, Normal Respiratory Rate    Heart:  Rate: Tachycardia  Rhythm: Regular Rhythm  Sounds: Normal    Abdomen:  Normal, Soft, Nontender        Anesthesia Plan  Type of Anesthesia, risks & benefits discussed:    Anesthesia Type: Gen ETT  Intra-op Monitoring Plan: Standard ASA Monitors  Post Op Pain Control Plan: multimodal analgesia and IV/PO Opioids PRN  Induction:  IV  Airway Plan: Direct, Post-Induction  Informed Consent: Informed consent signed with the Patient and all parties understand the risks and agree with anesthesia plan.  All questions answered. Patient consented to blood products? Yes  ASA Score: 2  Day of Surgery Review of History & Physical: H&P  Update referred to the surgeon/provider.    Ready For Surgery From Anesthesia Perspective.     .

## 2025-05-10 NOTE — TRANSFER OF CARE
Anesthesia Transfer of Care Note    Patient: Varsha Rg    Procedure(s) Performed: Procedure(s) (LRB):  INSERTION, INTRAMEDULLARY STANISLAW, FEMUR, DISTAL, RETROGRADE (Left)  INCISION AND DRAINAGE, LOWER EXTREMITY (Left)    Patient location: PACU    Anesthesia Type: general    Transport from OR: Transported from OR on room air with adequate spontaneous ventilation    Post pain: adequate analgesia    Post assessment: no apparent anesthetic complications    Post vital signs: stable    Level of consciousness: responds to stimulation    Nausea/Vomiting: no nausea/vomiting    Complications: none    Transfer of care protocol was followed    Last vitals: Visit Vitals  BP (!) 141/81 (BP Location: Left arm, Patient Position: Sitting)   Pulse 105   Temp 36.5 °C (97.7 °F) (Oral)   Resp 17   Ht 5' (1.524 m)   Wt 79.4 kg (175 lb)   SpO2 100%   BMI 34.18 kg/m²

## 2025-05-10 NOTE — PROGRESS NOTES
TERTIARY TRAUMA SURVEY (TTS)    List Injuries Identified to Date:   1.  Left distal femur fracture    List Operations and Procedures:   1.  Plan for operative intervention today    Past Surgical History:   Procedure Laterality Date    ADENOIDECTOMY      Can't remember    APPENDECTOMY      CARPAL TUNNEL RELEASE      CATARACT EXTRACTION Right 2022    CATARACT EXTRACTION Left 2022     SECTION      CHOLECYSTECTOMY      COLONOSCOPY  2015    EXCISION OF SQUAMOUS CELL CARCINOMA      EYE SURGERY      FOOT FRACTURE SURGERY      GASTRIC BYPASS      Sleeve    HIATAL HERNIA REPAIR  2022    Dr. gould    HYSTERECTOMY      JOINT REPLACEMENT  2017    Knee    KNEE SURGERY      LAPAROSCOPIC SLEEVE GASTRECTOMY  2022    Dr. Gould    RECONSTRUCTION OF SHOULDER      TONSILLECTOMY      TRIGGER FINGER RELEASE      WRIST FRACTURE SURGERY         Incidental findings:   1.  none    Past Medical History:   1.  As below  2.  Obesity is resolved  3.  Diabetes resolved after weight loss    Active Ambulatory Problems     Diagnosis Date Noted    Morbid obesity 2022    Deep vein thrombosis (DVT) of proximal lower extremity, unspecified chronicity, unspecified laterality 2022    Systemic lupus erythematosus, unspecified SLE type, unspecified organ involvement status 2023    Mixed hyperlipidemia 2023    Gastroesophageal reflux disease without esophagitis 2023    Recent URI 2024    Anxiety and depression 2024    Type 2 diabetes mellitus without complication, without long-term current use of insulin 2024    Annual physical exam 2024     Resolved Ambulatory Problems     Diagnosis Date Noted    No Resolved Ambulatory Problems     Past Medical History:   Diagnosis Date    Allergy ?    Arthritis     Benign paroxysmal vertigo, bilateral     Chronic back pain     Clotting disorder     Concussion     Deep vein thrombosis 1974    Degenerative disc  disease ?    DVT (deep venous thrombosis)     Encounter for blood transfusion 2017. And 2022    Fibromyalgia     GERD (gastroesophageal reflux disease)     History of syncope     Hypercholesteremia     Hypertension     IBS (irritable bowel syndrome)     Lymphedema     Osteoporosis     Personal history of colonic polyps 08/04/2015    Skin cancer     SOB (shortness of breath)     Systemic lupus erythematosus, organ or system involvement unspecified     Thyroid disease     Ulcer 1998    Unspecified cataract     Unspecified osteoarthritis, unspecified site      Past Medical History:   Diagnosis Date    Allergy ?    Years ago    Anxiety and depression 01/11/2024    Arthritis     Benign paroxysmal vertigo, bilateral     Chronic back pain     Clotting disorder 1975    BLOOD CLOTS IN LEGS    Concussion     Deep vein thrombosis 1974    9 day hospital stay both legs    Degenerative disc disease ?    DVT (deep venous thrombosis)     Encounter for blood transfusion 2017. And 2022    2 pints for each surgery    Fibromyalgia     GERD (gastroesophageal reflux disease)     History of syncope     Hypercholesteremia     Hypertension     IBS (irritable bowel syndrome)     Lymphedema     Osteoporosis     Personal history of colonic polyps 08/04/2015    Skin cancer     SOB (shortness of breath)     Systemic lupus erythematosus, organ or system involvement unspecified     Thyroid disease     Ulcer 1998    Unspecified cataract     Unspecified osteoarthritis, unspecified site        Tertiary Physical Exam:     Physical Exam  Constitutional:       Appearance: Normal appearance.   HENT:      Head: Normocephalic and atraumatic.      Nose: Nose normal.   Eyes:      Pupils: Pupils are equal, round, and reactive to light.   Cardiovascular:      Rate and Rhythm: Normal rate.      Pulses: Normal pulses.      Comments: Normal peripheral pulses  Pulmonary:      Effort: Pulmonary effort is normal. No respiratory distress.   Chest:      Chest wall: No  tenderness.   Abdominal:      General: Abdomen is flat. Bowel sounds are normal. There is no distension.      Palpations: Abdomen is soft.      Tenderness: There is no abdominal tenderness.   Musculoskeletal:         General: Tenderness and signs of injury present. No swelling or deformity.      Cervical back: Normal range of motion and neck supple. No tenderness.   Skin:     General: Skin is warm and dry.      Capillary Refill: Capillary refill takes less than 2 seconds.      Findings: No lesion.   Neurological:      General: No focal deficit present.      Mental Status: She is alert and oriented to person, place, and time. Mental status is at baseline.   Psychiatric:         Mood and Affect: Mood normal.         Behavior: Behavior normal.         Thought Content: Thought content normal.         Judgment: Judgment normal.         Imaging Review:     Imaging Results               CT Knee Without Contrast Left (Final result)  Result time 05/09/25 12:00:03      Final result by Andrei Hooker MD (05/09/25 12:00:03)                   Narrative:    EXAMINATION  CT KNEE WITHOUT CONTRAST LEFT    CLINICAL HISTORY  surgical planning; Displaced fracture of lateral condyle of left femur, initial encounter for open fracture type I or II    TECHNIQUE  Non-contrast helical-acquisition CT images of the left knee were obtained.  Multiplanar reconstructions accomplished by a CT technologist at a separate workstation, pushed to PACS for physician review.    COMPARISON  9 May 2025 radiographs    FINDINGS  Images were reviewed in bone and soft tissue windows.    Exam quality: adequate for evaluation    As previously seen, there is a severely comminuted, displaced fracture of the distal femoral diaphysis, extending into the diametaphyseal region and involving the anterior aspect of the medial femoral condyle and the trochlear.  The dominant fracture component involves approximately 2.2 cm posterior displacement of the femoral condyles  relative to the more proximal shaft.  Additional large fracture fragments are also displaced but less severely.  There is no acutely displaced fracture involving the patella or partially visualized proximal tibia and fibula.    Extensive tricompartmental degenerative changes are present, with large marginal osteophytic projections of the femoral condyles and tibial plateau.  There is near-complete loss of the medial femorotibial joint space.  No periosteal reaction or destructive bony lesion is identified.  Small knee effusion is present, with associated fat-fluid level.    Minimal periarticular subcutaneous edema is present.  There is no expansile hematoma or drainable fluid collection.  Minimal disorganized fluid/hematoma is present through the popliteal fossa.  The visualized musculature and tendinous structures are without convincing high-grade focal injury or other suspicious abnormality within limitations of non-contrast CT assessment.    IMPRESSION  1. Markedly comminuted, displaced fracture of the distal femur, extending from the diametaphyseal region into the medial condyle and trochlear groove.  2. No other convincing acute osseous abnormality.  3. Small lipohemarthrosis.  ==========  This report was flagged in Epic as abnormal.      RADIATION DOSE  Automated tube current modulation, weight-based exposure dosing, and/or iterative reconstruction technique utilized to reach lowest reasonably achievable exposure rate.    DLP: 582 mGy*cm      Electronically signed by: Andrei Hooker  Date:    05/09/2025  Time:    12:00                                     X-Ray Femur 2 View Left (Final result)  Result time 05/09/25 12:22:43   Procedure changed from X-Ray Knee Complete 4 or More Views Left     Final result by Andrei Hooker MD (05/09/25 12:22:43)                   Narrative:    EXAMINATION  XR FEMUR 2 VIEW LEFT    CLINICAL HISTORY  leg pain/fall; Unspecified injury of left lower leg, initial  encounter    TECHNIQUE  A total of 4 images submitted of the left femur.    COMPARISON  None available at the time of initial interpretation.    FINDINGS  Severely comminuted fracture of the distal femur diametaphysis is noted, with approximately 1 shaft-width posterior displacement of the femoral condyles and patella relative to the femoral shaft.  Remaining osseous structures are without convincing acute or focal abnormality.  Visualized joints are congruent.  There are advanced degenerative alterations of the knee with near-complete loss of the medial femorotibial joint space.    The included soft tissues are without acute abnormality.    IMPRESSION  Severely comminuted, displaced distal femoral fracture.      Electronically signed by: Andrei Hooker  Date:    05/09/2025  Time:    12:22                      Wet Read by Soledad Khan DO (05/09/25 11:02:37, [facility] - Emergency Dept, Emergency Medicine)    Impacted comminuted distal femur fracture with  knee involvement                                     X-Ray Chest 1 View (Final result)  Result time 05/09/25 11:54:13      Final result by Tashi Rivera MD (05/09/25 11:54:13)                   Impression:      No acute chest disease is identified.      Electronically signed by: Tashi Rivera  Date:    05/09/2025  Time:    11:54               Narrative:    EXAMINATION:  XR CHEST 1 VIEW    CLINICAL HISTORY:  , Injury, unspecified, initial encounter.    COMPARISON:  September 1, 2023    FINDINGS:  No alveolar consolidation, effusion, or pneumothorax is seen.   The thoracic aorta is normal  cardiac silhouette, central pulmonary vessels and mediastinum are normal in size and are grossly unremarkable.   visualized osseous structures are grossly unremarkable.                                       Lab Review:   CBC:  Recent Labs   Lab Result Units 02/27/25  0724 05/09/25  1055   WBC x10(3)/mcL 11.13 12.44*   RBC x10(6)/mcL 4.49 4.04*   Hgb  "g/dL 13.4 12.0   Hct % 42.0 37.5   Platelet x10(3)/mcL 262 222   MCV fL 93.5 92.8   MCH pg 29.8 29.7   MCHC g/dL 31.9* 32.0*       CMP:  Recent Labs   Lab Result Units 02/27/25  0724 05/09/25  1055   Glucose mg/dL 99 122*   Calcium mg/dL 9.3 8.6   Albumin g/dL 3.5 3.3*   Protein Total gm/dL 7.7* 6.7   Sodium mmol/L 144 142   Potassium mmol/L 4.0 4.2   CO2 mmol/L 29 27   Chloride mmol/L 107 107   Blood Urea Nitrogen mg/dL 17.7 17.5   Creatinine mg/dL 0.75 0.77   ALP unit/L 101 92   ALT unit/L 12 16   AST unit/L 16 18   Bilirubin Total mg/dL 0.4 0.5       Troponin:  Recent Labs   Lab Result Units 05/09/25  1055   Troponin-I ng/mL <0.010       ETOH:  No results for input(s): "ETHANOL" in the last 72 hours.     Urine Drug Screen:  No results for input(s): "COCAINE", "OPIATE", "BARBITURATE", "AMPHETAMINE", "FENTANYL", "CANNABINOIDS", "MDMA" in the last 72 hours.    Invalid input(s): "BENZODIAZEPINE", "PHENCYCLIDINE"   Plan:   Open left femur fracture   Lovenox b.I.d.   Afternoon CBC, patient has a history of transfusion after orthopedic surgery   Multimodal pain control   Nonweightbearing left lower extremity   Can have IV fluids stopped in regular diet started postoperatively  We will need PT and OT   Anticipate home on aspirin  Home antihypertensives held for now    Tico Samson NP  c - [phone]    "

## 2025-05-11 LAB
ALBUMIN SERPL-MCNC: 2.4 G/DL (ref 3.4–4.8)
ALBUMIN/GLOB SERPL: 0.8 RATIO (ref 1.1–2)
ALP SERPL-CCNC: 79 UNIT/L (ref 40–150)
ALT SERPL-CCNC: 101 UNIT/L (ref 0–55)
ANION GAP SERPL CALC-SCNC: 6 MEQ/L
AST SERPL-CCNC: 71 UNIT/L (ref 11–45)
BASOPHILS # BLD AUTO: 0.03 X10(3)/MCL
BASOPHILS NFR BLD AUTO: 0.2 %
BILIRUB SERPL-MCNC: 0.3 MG/DL
BUN SERPL-MCNC: 10.6 MG/DL (ref 9.8–20.1)
CALCIUM SERPL-MCNC: 8.2 MG/DL (ref 8.4–10.2)
CHLORIDE SERPL-SCNC: 105 MMOL/L (ref 98–107)
CO2 SERPL-SCNC: 25 MMOL/L (ref 23–31)
CREAT SERPL-MCNC: 0.59 MG/DL (ref 0.55–1.02)
CREAT/UREA NIT SERPL: 18
EOSINOPHIL # BLD AUTO: 0 X10(3)/MCL (ref 0–0.9)
EOSINOPHIL NFR BLD AUTO: 0 %
ERYTHROCYTE [DISTWIDTH] IN BLOOD BY AUTOMATED COUNT: 13.2 % (ref 11.5–17)
GFR SERPLBLD CREATININE-BSD FMLA CKD-EPI: >60 ML/MIN/1.73/M2
GLOBULIN SER-MCNC: 2.9 GM/DL (ref 2.4–3.5)
GLUCOSE SERPL-MCNC: 121 MG/DL (ref 82–115)
HCT VFR BLD AUTO: 23.9 % (ref 37–47)
HGB BLD-MCNC: 7.6 G/DL (ref 12–16)
IMM GRANULOCYTES # BLD AUTO: 0.09 X10(3)/MCL (ref 0–0.04)
IMM GRANULOCYTES NFR BLD AUTO: 0.5 %
LYMPHOCYTES # BLD AUTO: 2.46 X10(3)/MCL (ref 0.6–4.6)
LYMPHOCYTES NFR BLD AUTO: 14.1 %
MAGNESIUM SERPL-MCNC: 1.9 MG/DL (ref 1.6–2.6)
MCH RBC QN AUTO: 29.6 PG (ref 27–31)
MCHC RBC AUTO-ENTMCNC: 31.8 G/DL (ref 33–36)
MCV RBC AUTO: 93 FL (ref 80–94)
MONOCYTES # BLD AUTO: 1.54 X10(3)/MCL (ref 0.1–1.3)
MONOCYTES NFR BLD AUTO: 8.8 %
NEUTROPHILS # BLD AUTO: 13.36 X10(3)/MCL (ref 2.1–9.2)
NEUTROPHILS NFR BLD AUTO: 76.4 %
NRBC BLD AUTO-RTO: 0 %
PHOSPHATE SERPL-MCNC: 2.6 MG/DL (ref 2.3–4.7)
PLATELET # BLD AUTO: 188 X10(3)/MCL (ref 130–400)
PMV BLD AUTO: 11.3 FL (ref 7.4–10.4)
POTASSIUM SERPL-SCNC: 4 MMOL/L (ref 3.5–5.1)
PROT SERPL-MCNC: 5.3 GM/DL (ref 5.8–7.6)
RBC # BLD AUTO: 2.57 X10(6)/MCL (ref 4.2–5.4)
SODIUM SERPL-SCNC: 136 MMOL/L (ref 136–145)
WBC # BLD AUTO: 17.48 X10(3)/MCL (ref 4.5–11.5)

## 2025-05-11 PROCEDURE — 25000003 PHARM REV CODE 250: Performed by: STUDENT IN AN ORGANIZED HEALTH CARE EDUCATION/TRAINING PROGRAM

## 2025-05-11 PROCEDURE — 80053 COMPREHEN METABOLIC PANEL: CPT

## 2025-05-11 PROCEDURE — 63600175 PHARM REV CODE 636 W HCPCS: Performed by: SURGERY

## 2025-05-11 PROCEDURE — 21400001 HC TELEMETRY ROOM

## 2025-05-11 PROCEDURE — 25000003 PHARM REV CODE 250: Performed by: NURSE PRACTITIONER

## 2025-05-11 PROCEDURE — 63600175 PHARM REV CODE 636 W HCPCS

## 2025-05-11 PROCEDURE — 83735 ASSAY OF MAGNESIUM: CPT

## 2025-05-11 PROCEDURE — 0T9B70Z DRAINAGE OF BLADDER WITH DRAINAGE DEVICE, VIA NATURAL OR ARTIFICIAL OPENING: ICD-10-PCS | Performed by: SURGERY

## 2025-05-11 PROCEDURE — 99233 SBSQ HOSP IP/OBS HIGH 50: CPT | Mod: ,,, | Performed by: SURGERY

## 2025-05-11 PROCEDURE — 84100 ASSAY OF PHOSPHORUS: CPT

## 2025-05-11 PROCEDURE — 85025 COMPLETE CBC W/AUTO DIFF WBC: CPT

## 2025-05-11 PROCEDURE — 51701 INSERT BLADDER CATHETER: CPT

## 2025-05-11 PROCEDURE — 97162 PT EVAL MOD COMPLEX 30 MIN: CPT

## 2025-05-11 PROCEDURE — 36415 COLL VENOUS BLD VENIPUNCTURE: CPT

## 2025-05-11 PROCEDURE — 51798 US URINE CAPACITY MEASURE: CPT

## 2025-05-11 PROCEDURE — 25000003 PHARM REV CODE 250

## 2025-05-11 RX ORDER — TAMSULOSIN HYDROCHLORIDE 0.4 MG/1
0.4 CAPSULE ORAL DAILY
Status: DISCONTINUED | OUTPATIENT
Start: 2025-05-11 | End: 2025-05-19 | Stop reason: HOSPADM

## 2025-05-11 RX ORDER — MUPIROCIN 20 MG/G
OINTMENT TOPICAL 2 TIMES DAILY
Status: DISPENSED | OUTPATIENT
Start: 2025-05-11 | End: 2025-05-16

## 2025-05-11 RX ADMIN — ENOXAPARIN SODIUM 40 MG: 40 INJECTION SUBCUTANEOUS at 09:05

## 2025-05-11 RX ADMIN — DOCUSATE SODIUM 100 MG: 100 CAPSULE, LIQUID FILLED ORAL at 09:05

## 2025-05-11 RX ADMIN — CLONAZEPAM 4 MG: 1 TABLET ORAL at 08:05

## 2025-05-11 RX ADMIN — ENOXAPARIN SODIUM 40 MG: 40 INJECTION SUBCUTANEOUS at 08:05

## 2025-05-11 RX ADMIN — ACETAMINOPHEN 325MG 650 MG: 325 TABLET ORAL at 05:05

## 2025-05-11 RX ADMIN — PHENAZOPYRIDINE HYDROCHLORIDE 100 MG: 100 TABLET ORAL at 05:05

## 2025-05-11 RX ADMIN — DOCUSATE SODIUM 100 MG: 100 CAPSULE, LIQUID FILLED ORAL at 08:05

## 2025-05-11 RX ADMIN — ACETAMINOPHEN 325MG 650 MG: 325 TABLET ORAL at 03:05

## 2025-05-11 RX ADMIN — POLYETHYLENE GLYCOL 3350 17 G: 17 POWDER, FOR SOLUTION ORAL at 09:05

## 2025-05-11 RX ADMIN — METHOCARBAMOL 500 MG: 500 TABLET ORAL at 08:05

## 2025-05-11 RX ADMIN — TAMSULOSIN HYDROCHLORIDE 0.4 MG: 0.4 CAPSULE ORAL at 09:05

## 2025-05-11 RX ADMIN — PHENAZOPYRIDINE HYDROCHLORIDE 100 MG: 100 TABLET ORAL at 09:05

## 2025-05-11 RX ADMIN — ACETAMINOPHEN 325MG 650 MG: 325 TABLET ORAL at 09:05

## 2025-05-11 RX ADMIN — METHOCARBAMOL 500 MG: 500 TABLET ORAL at 01:05

## 2025-05-11 RX ADMIN — PHENAZOPYRIDINE HYDROCHLORIDE 100 MG: 100 TABLET ORAL at 01:05

## 2025-05-11 RX ADMIN — CEFAZOLIN SODIUM 2 G: 2 SOLUTION INTRAVENOUS at 01:05

## 2025-05-11 RX ADMIN — ACETAMINOPHEN 325MG 650 MG: 325 TABLET ORAL at 08:05

## 2025-05-11 RX ADMIN — ACETAMINOPHEN 325MG 650 MG: 325 TABLET ORAL at 01:05

## 2025-05-11 RX ADMIN — METHOCARBAMOL 500 MG: 500 TABLET ORAL at 03:05

## 2025-05-11 RX ADMIN — CEFAZOLIN SODIUM 2 G: 2 SOLUTION INTRAVENOUS at 06:05

## 2025-05-11 NOTE — PROGRESS NOTES
[facility] Neuro  Orthopedics  Progress Note    Patient Name: Varsha Rg  MRN: [id number]  Admission Date: 5/9/2025  Hospital Length of Stay: 2 days  Attending Provider: Jordan Wheatley MD  Primary Care Provider: Amrit Vogt II, MD  Follow-up For: Procedure(s) (LRB):  INSERTION, INTRAMEDULLARY STANISLAW, FEMUR, DISTAL, RETROGRADE (Left)  ORIF, FRACTURE, FEMUR, TRANSCONDYLAR WITH INTERCONDYLAR EXTENSION (Left)  REPAIR,MUSCLE,QUADRICEPS OR HAMSTRING (Left)  DEBRIDEMENT, SKIN, FASCIA, MUSCLE, AND BONE, OPEN FRACTURE SITE (Left)    Post-Operative Day: 1 Day Post-Op  Subjective:     Principal Problem:Open fracture of left distal femur    Principal Orthopedic Problem: 1 Day Post-Op   ORIF left open distal femur fracture.     Interval History: Resting comfortably this morning. States pain is controlled with oral medications. Had some urinary retention yesterday, now with dillard catheter in place. At the time of my visit, had not yet been up to work with therapy. She denies any numbness or tingling distally.     Review of patient's allergies indicates:   Allergen Reactions    Azithromycin Shortness Of Breath    Hydrocodone-acetaminophen Hallucinations    Pcn [penicillins]     Rosuvastatin     Levofloxacin Palpitations       Current Facility-Administered Medications   Medication    acetaminophen tablet 650 mg    clonazePAM tablet 4 mg    dextrose 50% injection 12.5 g    docusate sodium capsule 100 mg    enoxaparin injection 40 mg    furosemide tablet 20 mg    gabapentin capsule 300 mg    glucagon (human recombinant) injection 1 mg    haloperidol lactate injection 0.5 mg    magnesium hydroxide 400 mg/5 ml suspension 2,400 mg    meclizine tablet 12.5 mg    melatonin tablet 6 mg    methocarbamoL tablet 500 mg    mupirocin 2 % ointment    oxyCODONE immediate release tablet 5 mg    oxyCODONE immediate release tablet Tab 10 mg    phenazopyridine tablet 100 mg    polyethylene glycol packet 17 g     tamsulosin 24 hr capsule 0.4 mg     Objective:     Vital Signs (Most Recent):  Temp: 97.9 °F (36.6 °C) (05/11/25 1103)  Pulse: 89 (05/11/25 1103)  Resp: 18 (05/11/25 1103)  BP: 107/61 (05/11/25 1103)  SpO2: (!) 91 % (05/11/25 1103) Vital Signs (24h Range):  Temp:  [97.4 °F (36.3 °C)-98.6 °F (37 °C)] 97.9 °F (36.6 °C)  Pulse:  [80-96] 89  Resp:  [16-18] 18  SpO2:  [91 %-94 %] 91 %  BP: (102-109)/(44-68) 107/61     Weight: 79.4 kg (175 lb)  Height: 5' (152.4 cm)  Body mass index is 34.18 kg/m².      Intake/Output Summary (Last 24 hours) at 5/11/2025 1234  Last data filed at 5/11/2025 0531  Gross per 24 hour   Intake 360 ml   Output 3150 ml   Net -2790 ml       Physical Exam:   General the patient is alert and oriented x3 no acute distress nontoxic-appearing appropriate affect.    Constitutional: Vital signs are examined and stable.  Resp: No signs of labored breathing               LLE: -Skin: Dressing CDI, no strikethrough.           -MSK: Hip and Knee F/E, EHL/FHL, Gastroc/Tib anterior Strength 5/5; tolerates gentle passive ROM of the knee.            -Neuro:  Sensation intact to light touch L3-S1 dermatomes           -Lymphatic: No signs of lymphadenopathy           -CV: Capillary refill is less than 2 seconds. DP palpable. Compartments soft and compressible.         Diagnostic Findings:   Significant Labs:   Recent Lab Results  (Last 5 results in the past 72 hours)        05/11/25  0538   05/10/25  1259   05/10/25  0905   05/10/25  0904   05/09/25  2207        Albumin/Globulin Ratio 0.8   0.8             Albumin 2.4   2.6             ALP 79   102                206             Anion Gap 6.0   10.0             AST 71   219             Baso # 0.03               Basophil % 0.2               BILIRUBIN TOTAL 0.3   0.5             BUN 10.6   11.9             BUN/CREAT RATIO 18   18             Calcium 8.2   8.7             Chloride 105   105             CO2 25   22             Creatinine 0.59   0.67              eGFR >60  Comment: Estimated GFR calculated using the CKD-EPI creatinine (2021) equation.   >60  Comment: Estimated GFR calculated using the CKD-EPI creatinine (2021) equation.             Eos # 0.00               Eos % 0.0               Globulin, Total 2.9   3.3             Glucose 121   164             Gran # (ANC)               Hematocrit 23.9   28.3             Hemoglobin 7.6   9.0             Immature Grans (Abs) 0.09               Immature Granulocytes 0.5               Lactic Acid Level       1.7   2.5       Lymph # 2.46               LYMPH % 14.1               Lymphocyte %   5             Magnesium  1.90     2.00           MCH 29.6   29.9             MCHC 31.8   31.8             MCV 93.0   94.0             Mono # 1.54               Mono % 8.8   2             MPV 11.3   11.2             Neut # 13.36               Neut % 76.4               Neutrophils Relative   93             nRBC 0.0               nRBC %   1             QRS Duration               OHS QTC Calculation               Phosphorus Level 2.6     3.2           Platelet Estimate   Normal             Platelet Count 188   209             Potassium 4.0   4.4             PROTEIN TOTAL 5.3   5.9             RBC 2.57   3.01             RBC Morph   Normal             RDW 13.2   13.2             Sodium 136   137             WBC 17.48   25.23                                     Significant Imaging: I have reviewed and interpreted all pertinent imaging results/findings.     Assessment/Plan:     Active Diagnoses:    Diagnosis Date Noted POA    PRINCIPAL PROBLEM:  Open fracture of left distal femur [S72.402B] 05/10/2025 Yes      Problems Resolved During this Admission:     POD 1 ORIF left distal femur.   NWB, WILL. PT. Recommending high intensity therapy. Likely DC to reahab.   Continue multimodal pain control.   Lovenox for DVT ppx during stay and x 30 days at discharge followed by aspirin 81 mg BID.   Day for now. Trial voiding in 1-2 days.   Begin daily dry  dressing changes POD2. May cover with soft dressing or large band aid. Keep clean and dry. No showers to POD 7. Do not submerge. Do not apply ointments or creams.  Transfusion per primary team. ABLA related to femur fracture. Hgb 7.6. Continue to monitor.   Follow up with Dr. Purcell in 3 weeks. Please call with questions or concerns. Will continue to follow.       The above findings, diagnostics, and treatment plan were discussed with Dr. Purcell who is in agreement with the plan of care except as stated in additional documentation.      Viri Mcdonald PA-C  Orthopedic Trauma Surgery  [facility]

## 2025-05-11 NOTE — PROGRESS NOTES
Trauma Surgery   Daily Progress Note     HD#2  POD#1 Day Post-Op    Subjective  Hemoglobins 7.6 from 9 from 12  Excellent urine output 3.1 L  Had Day placed for retention   Blood pressure is low 100 systolic   Leukocytosis resolved  Pain well-controlled   Awaiting therapy  Was refusing Lovenox, educated patient, she will begin taking Lovenox     Scheduled Meds:   acetaminophen  650 mg Oral Q4H    ceFAZolin (Ancef) IV (PEDS and ADULTS)  2 g Intravenous Q8H    clonazePAM  4 mg Oral QHS    docusate sodium  100 mg Oral BID    enoxparin  40 mg Subcutaneous Q12H    gabapentin  300 mg Oral TID    hydrALAZINE  10 mg Intravenous Once    methocarbamoL  500 mg Oral Q8H    midazolam  2 mg Intravenous Once    mupirocin   Nasal BID    phenazopyridine  100 mg Oral TID WM    polyethylene glycol  17 g Oral BID    tamsulosin  0.4 mg Oral Daily       Continuous Infusions:    PRN Meds:  Current Facility-Administered Medications:     dextrose 50%, 12.5 g, Intravenous, PRN    furosemide, 20 mg, Oral, Daily PRN    glucagon (human recombinant), 1 mg, Intramuscular, PRN    haloperidol lactate, 0.5 mg, Intravenous, Q10 Min PRN    HYDROmorphone, 0.2 mg, Intravenous, Q5 Min PRN    ketorolac, 15 mg, Intravenous, Q8H PRN    magnesium hydroxide 400 mg/5 ml, 30 mL, Oral, Daily PRN    meclizine, 12.5 mg, Oral, TID PRN    melatonin, 6 mg, Oral, Nightly PRN    oxyCODONE, 5 mg, Oral, Q4H PRN    oxyCODONE, 10 mg, Oral, Q4H PRN     Objective  Temp:  [97.4 °F (36.3 °C)-98.6 °F (37 °C)] 97.7 °F (36.5 °C)  Pulse:  [] 80  Resp:  [14-20] 18  SpO2:  [92 %-100 %] 93 %  BP: ()/(44-77) 102/54     Gen: NAD, AAOx3  HEENT: EOMI, NCAT  CV: RR  Resp: no shortness of breath, normal WOB   Abd: soft, non-distended, NT  Ext:  Full ROM. No deformity.  Orthopedic dressing clean dry and intact     Labs  Recent Labs     05/09/25  1055 05/09/25  1605 05/10/25  1259 05/11/25  0538   WBC 12.44*  --  25.23* 17.48*   HGB 12.0  --  9.0* 7.6*   HCT 37.5  --  28.3*  "23.9*     --  209 188   INR  --  1.0  --   --      Recent Labs     05/09/25  1055 05/10/25  0905 05/10/25  1259 05/11/25  0538     --  137 136   K 4.2  --  4.4 4.0     --  105 105   CO2 27  --  22* 25   BUN 17.5  --  11.9 10.6   CREATININE 0.77  --  0.67 0.59   *  --  164* 121*   CALCIUM 8.6  --  8.7 8.2*   MG  --  2.00  --  1.90   PHOS  --  3.2  --  2.6   PROT 6.7  --  5.9 5.3*   ALBUMIN 3.3*  --  2.6* 2.4*   BILITOT 0.5  --  0.5 0.3   AST 18  --  219* 71*   ALKPHOS 92  --  102 79   ALT 16  --  206* 101*     No results for input(s): "POCTGLUCOSE" in the last 72 hours.     Imaging  X-Ray Femur 2 View Left  Result Date: 5/10/2025  Restoration of near anatomic alignment following internal fixation of the distal femoral comminuted fracture. Electronically signed by: Bar Martines MD Date:    05/10/2025 Time:    13:28         Assessment/Plan  Open left femur fracture   Lovenox b.I.d., patient will begin taking today   Discuss transfusion with the attending  Multimodal pain control   Nonweightbearing left lower extremity x 10 wks  We will need PT and OT   Anticipate home on aspirin  Home antihypertensives held for now  Perioperative Ancef    Urinary retention  Flomax started   Day for now   Can restart voiding trials once more ambulatory    Fall   Regular diet   Multimodal pain control   Incentive spirometry   Disposition pending therapy evaluation today    Tico Samson  Trauma Surgery   c - [phone]    "

## 2025-05-11 NOTE — PLAN OF CARE
Problem: Physical Therapy  Goal: Physical Therapy Goal  Description: Pt will be seen for the following goals  1. Pt will perform bed mobility with dannie  2. Pt will perform transfers with a rw with dannie  3. Pt will amb 10ft with rw NWXAVI BATISTAE dannie  Outcome: Progressing

## 2025-05-11 NOTE — PT/OT/SLP EVAL
Physical Therapy Evaluation    Patient Name:  Varsha Rg   MRN:  [id number]    Recommendations:     Discharge therapy intensity: High Intensity Therapy   Discharge Equipment Recommendations: walker, rolling   Barriers to discharge: Impaired mobility    Assessment:     Varsha Rg is a 70 y.o. female admitted with a medical diagnosis of Distal femur fracture s/p ORIF left distal femur, left quad tendon repair, and excisional debridement of open fx left knee. She presents with the following impairments/functional limitations: weakness, gait instability, impaired endurance, impaired balance, impaired self care skills, impaired functional mobility, decreased safety awareness, pain     Rehab Prognosis: Good; patient would benefit from acute skilled PT services to address these deficits and reach maximum level of function.    Recent Surgery: Procedure(s) (LRB):  INSERTION, INTRAMEDULLARY STANISLAW, FEMUR, DISTAL, RETROGRADE (Left)  ORIF, FRACTURE, FEMUR, TRANSCONDYLAR WITH INTERCONDYLAR EXTENSION (Left)  REPAIR,MUSCLE,QUADRICEPS OR HAMSTRING (Left)  DEBRIDEMENT, SKIN, FASCIA, MUSCLE, AND BONE, OPEN FRACTURE SITE (Left) 1 Day Post-Op    Plan:     During this hospitalization, patient would benefit from acute PT services 6 x/week to address the identified rehab impairments via gait training, therapeutic activities and progress toward the following goals:    Plan of Care Expires:  05/27/25    Subjective     Chief Complaint:   Patient/Family Comments/goals:   Pain/Comfort:       Patients cultural, spiritual, Islam conflicts given the current situation:      Living Environment:  Pt lives with spouse in house with no steps. She does have a walk in tub  Prior to admission, patients level of function was ind to mod ind.  Equipment used at home: rollator, wheelchair.  DME owned (not currently used): .  Upon discharge, patient will have assistance from spouse.    Objective:     Communicated with nurse prior to session.   Patient found supine with dillard catheter, peripheral IV  upon PT entry to room.    General Precautions: Standard, fall  Orthopedic Precautions:LLE non weight bearing   Braces: N/A  Respiratory Status: Room air  Blood Pressure:       Exams:  RLE ROM: WFL  RLE Strength: WFL  Skin integrity: Visible skin intact      Functional Mobility:  Bed Mobility:     Scooting: maximal assistance  Supine to Sit: maximal assistance  Sit to Supine: maximal assistance  Transfers:     Sit to Stand:  maximal assistance with rolling walker  Bed to Chair: maximal assistance with  rolling walker  using  Stand Pivot  Gait: pt was not able to walk because she got very dizzy in standing       AM-PAC 6 CLICK MOBILITY  Total Score:        Treatment & Education:      Patient provided with verbal education education regarding PT role/goals/POC, post-op precautions, fall prevention, and safety awareness.  Understanding was verbalized, however additional teaching warranted.     Patient left supine with all lines intact and call button in reach.    GOALS:   Multidisciplinary Problems       Physical Therapy Goals          Problem: Physical Therapy    Goal Priority Disciplines Outcome Interventions   Physical Therapy Goal     PT, PT/OT Progressing    Description: Pt will be seen for the following goals  1. Pt will perform bed mobility with dannie  2. Pt will perform transfers with a rw with dannie  3. Pt will amb 10ft with rw NWB LLE dannie                       History:     Past Medical History:   Diagnosis Date    Allergy ?    Years ago    Anxiety and depression 01/11/2024    Arthritis     Benign paroxysmal vertigo, bilateral     Chronic back pain     Clotting disorder 1975    BLOOD CLOTS IN LEGS    Concussion     Deep vein thrombosis 1974    9 day hospital stay both legs    Degenerative disc disease ?    DVT (deep venous thrombosis)     Encounter for blood transfusion 2017. And 2022    2 pints for each surgery    Fibromyalgia     GERD (gastroesophageal  reflux disease)     History of syncope     Hypercholesteremia     Hypertension     IBS (irritable bowel syndrome)     Lymphedema     Osteoporosis     Personal history of colonic polyps 2015    Skin cancer     SOB (shortness of breath)     Systemic lupus erythematosus, organ or system involvement unspecified     Thyroid disease     Ulcer 1998    Unspecified cataract     Unspecified osteoarthritis, unspecified site        Past Surgical History:   Procedure Laterality Date    ADENOIDECTOMY      Can't remember    APPENDECTOMY      CARPAL TUNNEL RELEASE      CATARACT EXTRACTION Right 2022    CATARACT EXTRACTION Left 2022     SECTION      CHOLECYSTECTOMY      COLONOSCOPY  2015    EXCISION OF SQUAMOUS CELL CARCINOMA      EYE SURGERY      FOOT FRACTURE SURGERY      GASTRIC BYPASS      Sleeve    HIATAL HERNIA REPAIR  2022    Dr. gould    HYSTERECTOMY      JOINT REPLACEMENT  2017    Knee    KNEE SURGERY      LAPAROSCOPIC SLEEVE GASTRECTOMY  2022    Dr. Gould    RECONSTRUCTION OF SHOULDER      TONSILLECTOMY      TRIGGER FINGER RELEASE      WRIST FRACTURE SURGERY         Time Tracking:     PT Received On:    PT Start Time: 1155     PT Stop Time: 1218  PT Total Time (min): 23 min     Billable Minutes: Evaluation 2025

## 2025-05-12 LAB
ABO + RH BLD: NORMAL
ABO + RH BLD: NORMAL
ALBUMIN SERPL-MCNC: 2.2 G/DL (ref 3.4–4.8)
ALBUMIN/GLOB SERPL: 0.7 RATIO (ref 1.1–2)
ALP SERPL-CCNC: 68 UNIT/L (ref 40–150)
ALT SERPL-CCNC: 33 UNIT/L (ref 0–55)
ANION GAP SERPL CALC-SCNC: 6 MEQ/L
AST SERPL-CCNC: 31 UNIT/L (ref 11–45)
BASOPHILS # BLD AUTO: 0.06 X10(3)/MCL
BASOPHILS # BLD AUTO: 0.09 X10(3)/MCL
BASOPHILS NFR BLD AUTO: 0.5 %
BASOPHILS NFR BLD AUTO: 0.7 %
BILIRUB SERPL-MCNC: 0.3 MG/DL
BLD PROD TYP BPU: NORMAL
BLD PROD TYP BPU: NORMAL
BLOOD UNIT EXPIRATION DATE: NORMAL
BLOOD UNIT EXPIRATION DATE: NORMAL
BLOOD UNIT TYPE CODE: 6200
BLOOD UNIT TYPE CODE: 6200
BUN SERPL-MCNC: 10.1 MG/DL (ref 9.8–20.1)
CALCIUM SERPL-MCNC: 8 MG/DL (ref 8.4–10.2)
CHLORIDE SERPL-SCNC: 107 MMOL/L (ref 98–107)
CO2 SERPL-SCNC: 27 MMOL/L (ref 23–31)
CREAT SERPL-MCNC: 0.56 MG/DL (ref 0.55–1.02)
CREAT/UREA NIT SERPL: 18
CROSSMATCH INTERPRETATION: NORMAL
CROSSMATCH INTERPRETATION: NORMAL
CRP SERPL-MCNC: 54.6 MG/L
DISPENSE STATUS: NORMAL
DISPENSE STATUS: NORMAL
EOSINOPHIL # BLD AUTO: 0.03 X10(3)/MCL (ref 0–0.9)
EOSINOPHIL # BLD AUTO: 0.06 X10(3)/MCL (ref 0–0.9)
EOSINOPHIL NFR BLD AUTO: 0.2 %
EOSINOPHIL NFR BLD AUTO: 0.5 %
ERYTHROCYTE [DISTWIDTH] IN BLOOD BY AUTOMATED COUNT: 13.6 % (ref 11.5–17)
ERYTHROCYTE [DISTWIDTH] IN BLOOD BY AUTOMATED COUNT: 13.9 % (ref 11.5–17)
GFR SERPLBLD CREATININE-BSD FMLA CKD-EPI: >60 ML/MIN/1.73/M2
GLOBULIN SER-MCNC: 3.3 GM/DL (ref 2.4–3.5)
GLUCOSE SERPL-MCNC: 99 MG/DL (ref 82–115)
HCT VFR BLD AUTO: 19.9 % (ref 37–47)
HCT VFR BLD AUTO: 28.1 % (ref 37–47)
HCT VFR BLD AUTO: 28.6 % (ref 37–47)
HGB BLD-MCNC: 6.1 G/DL (ref 12–16)
HGB BLD-MCNC: 9.1 G/DL (ref 12–16)
HGB BLD-MCNC: 9.4 G/DL (ref 12–16)
IMM GRANULOCYTES # BLD AUTO: 0.1 X10(3)/MCL (ref 0–0.04)
IMM GRANULOCYTES # BLD AUTO: 0.12 X10(3)/MCL (ref 0–0.04)
IMM GRANULOCYTES NFR BLD AUTO: 0.8 %
IMM GRANULOCYTES NFR BLD AUTO: 0.9 %
LYMPHOCYTES # BLD AUTO: 2.79 X10(3)/MCL (ref 0.6–4.6)
LYMPHOCYTES # BLD AUTO: 3.53 X10(3)/MCL (ref 0.6–4.6)
LYMPHOCYTES NFR BLD AUTO: 21.9 %
LYMPHOCYTES NFR BLD AUTO: 28.3 %
MAGNESIUM SERPL-MCNC: 1.9 MG/DL (ref 1.6–2.6)
MCH RBC QN AUTO: 29.2 PG (ref 27–31)
MCH RBC QN AUTO: 30.2 PG (ref 27–31)
MCHC RBC AUTO-ENTMCNC: 30.7 G/DL (ref 33–36)
MCHC RBC AUTO-ENTMCNC: 32.9 G/DL (ref 33–36)
MCV RBC AUTO: 92 FL (ref 80–94)
MCV RBC AUTO: 95.2 FL (ref 80–94)
MONOCYTES # BLD AUTO: 1.05 X10(3)/MCL (ref 0.1–1.3)
MONOCYTES # BLD AUTO: 1.13 X10(3)/MCL (ref 0.1–1.3)
MONOCYTES NFR BLD AUTO: 8.4 %
MONOCYTES NFR BLD AUTO: 8.9 %
NEUTROPHILS # BLD AUTO: 7.69 X10(3)/MCL (ref 2.1–9.2)
NEUTROPHILS # BLD AUTO: 8.54 X10(3)/MCL (ref 2.1–9.2)
NEUTROPHILS NFR BLD AUTO: 61.8 %
NEUTROPHILS NFR BLD AUTO: 67.1 %
NRBC BLD AUTO-RTO: 0 %
NRBC BLD AUTO-RTO: 0.4 %
PHOSPHATE SERPL-MCNC: 2.2 MG/DL (ref 2.3–4.7)
PLATELET # BLD AUTO: 160 X10(3)/MCL (ref 130–400)
PLATELET # BLD AUTO: 165 X10(3)/MCL (ref 130–400)
PMV BLD AUTO: 10.8 FL (ref 7.4–10.4)
PMV BLD AUTO: 11.1 FL (ref 7.4–10.4)
POTASSIUM SERPL-SCNC: 3.8 MMOL/L (ref 3.5–5.1)
PREALB SERPL-MCNC: 13.7 MG/DL (ref 14–37)
PROT SERPL-MCNC: 5.5 GM/DL (ref 5.8–7.6)
RBC # BLD AUTO: 2.09 X10(6)/MCL (ref 4.2–5.4)
RBC # BLD AUTO: 3.11 X10(6)/MCL (ref 4.2–5.4)
SODIUM SERPL-SCNC: 140 MMOL/L (ref 136–145)
UNIT NUMBER: NORMAL
UNIT NUMBER: NORMAL
WBC # BLD AUTO: 12.46 X10(3)/MCL (ref 4.5–11.5)
WBC # BLD AUTO: 12.73 X10(3)/MCL (ref 4.5–11.5)

## 2025-05-12 PROCEDURE — 84134 ASSAY OF PREALBUMIN: CPT

## 2025-05-12 PROCEDURE — 36430 TRANSFUSION BLD/BLD COMPNT: CPT

## 2025-05-12 PROCEDURE — 25000003 PHARM REV CODE 250: Performed by: STUDENT IN AN ORGANIZED HEALTH CARE EDUCATION/TRAINING PROGRAM

## 2025-05-12 PROCEDURE — 25000003 PHARM REV CODE 250: Performed by: NURSE PRACTITIONER

## 2025-05-12 PROCEDURE — 27000221 HC OXYGEN, UP TO 24 HOURS

## 2025-05-12 PROCEDURE — 36415 COLL VENOUS BLD VENIPUNCTURE: CPT

## 2025-05-12 PROCEDURE — 94760 N-INVAS EAR/PLS OXIMETRY 1: CPT

## 2025-05-12 PROCEDURE — 85014 HEMATOCRIT: CPT

## 2025-05-12 PROCEDURE — 83735 ASSAY OF MAGNESIUM: CPT

## 2025-05-12 PROCEDURE — 99233 SBSQ HOSP IP/OBS HIGH 50: CPT | Mod: GC,,, | Performed by: STUDENT IN AN ORGANIZED HEALTH CARE EDUCATION/TRAINING PROGRAM

## 2025-05-12 PROCEDURE — 30233N1 TRANSFUSION OF NONAUTOLOGOUS RED BLOOD CELLS INTO PERIPHERAL VEIN, PERCUTANEOUS APPROACH: ICD-10-PCS | Performed by: SURGERY

## 2025-05-12 PROCEDURE — 86140 C-REACTIVE PROTEIN: CPT

## 2025-05-12 PROCEDURE — 63600175 PHARM REV CODE 636 W HCPCS

## 2025-05-12 PROCEDURE — 94799 UNLISTED PULMONARY SVC/PX: CPT | Mod: XB

## 2025-05-12 PROCEDURE — 21400001 HC TELEMETRY ROOM

## 2025-05-12 PROCEDURE — 97535 SELF CARE MNGMENT TRAINING: CPT

## 2025-05-12 PROCEDURE — 99900031 HC PATIENT EDUCATION (STAT)

## 2025-05-12 PROCEDURE — 97166 OT EVAL MOD COMPLEX 45 MIN: CPT

## 2025-05-12 PROCEDURE — P9016 RBC LEUKOCYTES REDUCED: HCPCS | Performed by: STUDENT IN AN ORGANIZED HEALTH CARE EDUCATION/TRAINING PROGRAM

## 2025-05-12 PROCEDURE — 85025 COMPLETE CBC W/AUTO DIFF WBC: CPT

## 2025-05-12 PROCEDURE — 86923 COMPATIBILITY TEST ELECTRIC: CPT | Performed by: STUDENT IN AN ORGANIZED HEALTH CARE EDUCATION/TRAINING PROGRAM

## 2025-05-12 PROCEDURE — 80053 COMPREHEN METABOLIC PANEL: CPT

## 2025-05-12 PROCEDURE — 25000003 PHARM REV CODE 250

## 2025-05-12 PROCEDURE — 84100 ASSAY OF PHOSPHORUS: CPT

## 2025-05-12 PROCEDURE — 94799 UNLISTED PULMONARY SVC/PX: CPT

## 2025-05-12 PROCEDURE — 97530 THERAPEUTIC ACTIVITIES: CPT | Mod: CQ

## 2025-05-12 PROCEDURE — 99900035 HC TECH TIME PER 15 MIN (STAT)

## 2025-05-12 RX ORDER — HYDROCODONE BITARTRATE AND ACETAMINOPHEN 500; 5 MG/1; MG/1
TABLET ORAL
Status: DISCONTINUED | OUTPATIENT
Start: 2025-05-12 | End: 2025-05-13

## 2025-05-12 RX ADMIN — PHENAZOPYRIDINE HYDROCHLORIDE 100 MG: 100 TABLET ORAL at 08:05

## 2025-05-12 RX ADMIN — POLYETHYLENE GLYCOL 3350 17 G: 17 POWDER, FOR SOLUTION ORAL at 08:05

## 2025-05-12 RX ADMIN — METHOCARBAMOL 500 MG: 500 TABLET ORAL at 09:05

## 2025-05-12 RX ADMIN — ACETAMINOPHEN 325MG 650 MG: 325 TABLET ORAL at 05:05

## 2025-05-12 RX ADMIN — ENOXAPARIN SODIUM 40 MG: 40 INJECTION SUBCUTANEOUS at 09:05

## 2025-05-12 RX ADMIN — CLONAZEPAM 4 MG: 1 TABLET ORAL at 09:05

## 2025-05-12 RX ADMIN — POTASSIUM PHOSPHATE, MONOBASIC AND POTASSIUM PHOSPHATE, DIBASIC 15 MMOL: 224; 236 INJECTION, SOLUTION, CONCENTRATE INTRAVENOUS at 07:05

## 2025-05-12 RX ADMIN — TAMSULOSIN HYDROCHLORIDE 0.4 MG: 0.4 CAPSULE ORAL at 08:05

## 2025-05-12 RX ADMIN — ENOXAPARIN SODIUM 40 MG: 40 INJECTION SUBCUTANEOUS at 08:05

## 2025-05-12 RX ADMIN — METHOCARBAMOL 500 MG: 500 TABLET ORAL at 05:05

## 2025-05-12 RX ADMIN — DOCUSATE SODIUM 100 MG: 100 CAPSULE, LIQUID FILLED ORAL at 08:05

## 2025-05-12 RX ADMIN — PHENAZOPYRIDINE HYDROCHLORIDE 100 MG: 100 TABLET ORAL at 12:05

## 2025-05-12 RX ADMIN — ACETAMINOPHEN 325MG 650 MG: 325 TABLET ORAL at 10:05

## 2025-05-12 RX ADMIN — METHOCARBAMOL 500 MG: 500 TABLET ORAL at 01:05

## 2025-05-12 RX ADMIN — ACETAMINOPHEN 325MG 650 MG: 325 TABLET ORAL at 01:05

## 2025-05-12 RX ADMIN — PHENAZOPYRIDINE HYDROCHLORIDE 100 MG: 100 TABLET ORAL at 05:05

## 2025-05-12 RX ADMIN — DOCUSATE SODIUM 100 MG: 100 CAPSULE, LIQUID FILLED ORAL at 09:05

## 2025-05-12 RX ADMIN — MAGNESIUM HYDROXIDE 2400 MG: 400 SUSPENSION ORAL at 12:05

## 2025-05-12 RX ADMIN — ACETAMINOPHEN 325MG 650 MG: 325 TABLET ORAL at 09:05

## 2025-05-12 NOTE — PT/OT/SLP EVAL
Occupational Therapy  Evaluation    Name: Varsha Rg  MRN: [id number]  Recent Surgery: Procedure(s) (LRB):  INSERTION, INTRAMEDULLARY STANISLAW, FEMUR, DISTAL, RETROGRADE (Left)  ORIF, FRACTURE, FEMUR, TRANSCONDYLAR WITH INTERCONDYLAR EXTENSION (Left)  REPAIR,MUSCLE,QUADRICEPS OR HAMSTRING (Left)  DEBRIDEMENT, SKIN, FASCIA, MUSCLE, AND BONE, OPEN FRACTURE SITE (Left) 2 Days Post-Op    Recommendations:     Discharge therapy intensity: High Intensity Therapy   Discharge Equipment Recommendations:  bath bench, walker, rolling  Barriers to discharge:  Other (Comment) (ongoing medical needs)    Assessment:     Varsha Rg is a 70 y.o. female with a medical diagnosis of L distal femur fx s/p ORIF & quad tendon repair; injuries are result of a fall. She is A&Ox4 and tolerated OT evaluation fairly well. She reports living with her  and being independent with ADLs prior. She presents with the following performance deficits affecting function: weakness, impaired endurance, impaired self care skills, impaired functional mobility, gait instability, impaired balance, decreased safety awareness, decreased lower extremity function, decreased upper extremity function, orthopedic precautions. She required mod A for bed mobility and max A x2 for functional t/fs. Recommend high intensity therapy upon d/c.     Rehab Prognosis: Good; patient would benefit from acute skilled OT services to address these deficits and reach maximum level of function.       Plan:     Patient to be seen 6 x/week to address the above listed problems via self-care/home management, therapeutic activities, therapeutic exercises  Plan of Care Expires: 06/12/25  Plan of Care Reviewed with: patient, spouse    Subjective     Chief Complaint: LLE pain   Patient/Family Comments/goals: to go home    Occupational Profile:  Living Environment: Pt lives with her  in a single level home with no steps to enter. Pt has a tub/shower combo with a SC.    Previous level of function: Pt reports being independent with ADLs prior.   Roles and Routines: wife  Equipment Used at Home: power chair, rollator, shower chair  Assistance upon Discharge: Pt will have assist from her  upon d/c.     Pain/Comfort:  Pain Rating 1: other (see comments) (verbalized pain but did not rate)  Location - Side 1: Left  Location 1: leg  Pain Addressed 1: Reposition, Distraction, Nurse notified    Patients cultural, spiritual, Scientology conflicts given the current situation: no    Objective:     OT communicated with RN prior to session.      Patient was found HOB elevated with dillard catheter, peripheral IV upon OT entry to room.    General Precautions: Standard, fall  Orthopedic Precautions: LLE non weight bearing  Braces: N/A    Vital Signs: Blood Pressure: 120/58  Respiratory Status: on room air    Bed Mobility:    Patient completed Scooting/Bridging with moderate assistance  Patient completed Supine to Sit with moderate assistance    Functional Mobility/Transfers:  Patient completed Sit <> Stand Transfer with maximal assistance and of x2 persons  with  rolling walker   Patient completed Bed <> Chair Transfer using Stand Pivot technique with maximal assistance and of x2 persons with no assistive device  Patient completed BSC Transfer Stand Pivot technique with maximal assistance and of x2 persons with  no AD  Functional Mobility: pt with difficulty adhering to LLE NWB precautions with sit<>stand t/fs; required max verbal cueing.     Activities of Daily Living:  Lower Body Dressing: maximal assistance to don socks.   Toileting: maximal assistance for perineal hygiene following BM.     AMPAC 6 Click ADL:  AMPAC Total Score: 17    Functional Cognition:  Orientation: oriented to Person, Place, Time, and Situation  Safety Awareness: Impaired.      Visual Perceptual Skills:  Intact    Upper Extremity Function:  Right Upper Extremity:   WFL    Left Upper Extremity:  WFL    Balance:    Overall max A x2    Therapeutic Positioning  Risk for acquired pressure injuries is increased due to relative decrease in mobility d/t hospitalization  and impaired mobility.    OT interventions performed during the course of today's session:   Therapeutic positioning was provided at the conclusion of session to offload all bony prominences for the prevention and/or reduction of pressure injuries    Skin assessment: all bony prominences were assessed    Findings: Visible skin intact.     OT recommendations for therapeutic positioning throughout hospitalization:   Follow [facility] Pressure Injury Prevention Protocol    Patient Education:  Patient and spouse were provided with verbal education education regarding OT role/goals/POC, post op precautions, fall prevention, safety awareness, Discharge/DME recommendations, and pressure ulcer prevention.  Understanding was verbalized.     Patient left up in chair with all lines intact, call button in reach, neto pad in place, RN notified, and spouse present.    GOALS:   Multidisciplinary Problems       Occupational Therapy Goals          Problem: Occupational Therapy    Goal Priority Disciplines Outcome Interventions   Occupational Therapy Goal     OT, PT/OT Progressing    Description: LTG: Pt will perform basic ADLs and ADL transfers with Modified independence using LRAD by discharge.    STG: to be met by 6/12/25    Pt will complete grooming standing at sink with LRAD with SBA.  Pt will complete UB dressing with SBA.  Pt will complete LB dressing with SBA using LRAD and AE prn.  Pt will complete toileting with SBA using LRAD.  Pt will complete functional mobility to/from toilet and toilet transfer with SBA using LRAD.                        History:     Past Medical History:   Diagnosis Date    Allergy ?    Years ago    Anxiety and depression 01/11/2024    Arthritis     Benign paroxysmal vertigo, bilateral     Chronic back pain     Clotting disorder 1975    BLOOD CLOTS IN  LEGS    Concussion     Deep vein thrombosis 1974    9 day hospital stay both legs    Degenerative disc disease ?    DVT (deep venous thrombosis)     Encounter for blood transfusion 2017. And     2 pints for each surgery    Fibromyalgia     GERD (gastroesophageal reflux disease)     History of syncope     Hypercholesteremia     Hypertension     IBS (irritable bowel syndrome)     Lymphedema     Osteoporosis     Personal history of colonic polyps 2015    Skin cancer     SOB (shortness of breath)     Systemic lupus erythematosus, organ or system involvement unspecified     Thyroid disease     Ulcer 1998    Unspecified cataract     Unspecified osteoarthritis, unspecified site          Past Surgical History:   Procedure Laterality Date    ADENOIDECTOMY      Can't remember    APPENDECTOMY      CARPAL TUNNEL RELEASE      CATARACT EXTRACTION Right 2022    CATARACT EXTRACTION Left 2022     SECTION      CHOLECYSTECTOMY      COLONOSCOPY  2015    DEBRIDEMENT, SKIN, FASCIA, MUSCLE, AND BONE, OPEN FRACTURE SITE Left 5/10/2025    Procedure: DEBRIDEMENT, SKIN, FASCIA, MUSCLE, AND BONE, OPEN FRACTURE SITE;  Surgeon: Brent Purcell DO;  Location: [facility];  Service: Orthopedics;  Laterality: Left;    EXCISION OF SQUAMOUS CELL CARCINOMA      EYE SURGERY      FOOT FRACTURE SURGERY      GASTRIC BYPASS      Sleeve    HIATAL HERNIA REPAIR  2022    Dr. gould    HYSTERECTOMY      JOINT REPLACEMENT  2017    Knee    KNEE SURGERY      LAPAROSCOPIC SLEEVE GASTRECTOMY  2022    Dr. Gould    ORIF, FRACTURE, FEMUR, TRANSCONDYLAR WITH INTERCONDYLAR EXTENSION Left 5/10/2025    Procedure: ORIF, FRACTURE, FEMUR, TRANSCONDYLAR WITH INTERCONDYLAR EXTENSION;  Surgeon: Brent Purcell DO;  Location: [facility];  Service: Orthopedics;  Laterality: Left;    RECONSTRUCTION OF SHOULDER      REPAIR, MUSCLE, QUADRICEPS OR HAMSTRING; Left 5/10/2025    Procedure: REPAIR,MUSCLE,QUADRICEPS OR HAMSTRING;   Surgeon: Brent Purcell DO;  Location: [facility];  Service: Orthopedics;  Laterality: Left;    RETROGRADE INTRAMEDULLARY RODDING OF DISTAL FEMUR Left 5/10/2025    Procedure: INSERTION, INTRAMEDULLARY STANISLAW, FEMUR, DISTAL, RETROGRADE;  Surgeon: Brent Purcell DO;  Location: [facility];  Service: Orthopedics;  Laterality: Left;  supine nicho traction triangles, c arm synthes retrograde LAW, wash stuff    TONSILLECTOMY      TRIGGER FINGER RELEASE      WRIST FRACTURE SURGERY         Time Tracking:     OT Date of Treatment: 05/12/25  OT Start Time: 1424  OT Stop Time: 1502  OT Total Time (min): 38 min    Billable Minutes:Evaluation moderate complexity.   Self Care/Home Management 1    5/12/2025

## 2025-05-12 NOTE — PROGRESS NOTES
[facility] Neuro  Orthopedics  Progress Note    Patient Name: Varsha Rg  MRN: [id number]  Admission Date: 5/9/2025  Hospital Length of Stay: 3 days  Attending Provider: Dk Dunbar MD  Primary Care Provider: Amrit Vogt II, MD  Follow-up For: Procedure(s) (LRB):  INSERTION, INTRAMEDULLARY STANISLAW, FEMUR, DISTAL, RETROGRADE (Left)  ORIF, FRACTURE, FEMUR, TRANSCONDYLAR WITH INTERCONDYLAR EXTENSION (Left)  REPAIR,MUSCLE,QUADRICEPS OR HAMSTRING (Left)  DEBRIDEMENT, SKIN, FASCIA, MUSCLE, AND BONE, OPEN FRACTURE SITE (Left)    Post-Operative Day: 1 Day Post-Op  Subjective:     Principal Problem:Open fracture of left distal femur    Principal Orthopedic Problem: 2 Days Post-Op   ORIF left open distal femur fracture.     Interval History: Resting comfortably this morning. States pain is controlled with oral medications.Getting transfused this am. Discussed therapy to assess for DC needs. Questions answered.     Review of patient's allergies indicates:   Allergen Reactions    Azithromycin Shortness Of Breath    Hydrocodone-acetaminophen Hallucinations    Pcn [penicillins]     Rosuvastatin     Levofloxacin Palpitations       Current Facility-Administered Medications   Medication    0.9%  NaCl infusion (for blood administration)    acetaminophen tablet 650 mg    clonazePAM tablet 4 mg    dextrose 50% injection 12.5 g    docusate sodium capsule 100 mg    enoxaparin injection 40 mg    furosemide tablet 20 mg    gabapentin capsule 300 mg    glucagon (human recombinant) injection 1 mg    haloperidol lactate injection 0.5 mg    magnesium hydroxide 400 mg/5 ml suspension 2,400 mg    meclizine tablet 12.5 mg    melatonin tablet 6 mg    methocarbamoL tablet 500 mg    mupirocin 2 % ointment    oxyCODONE immediate release tablet 5 mg    oxyCODONE immediate release tablet Tab 10 mg    phenazopyridine tablet 100 mg    polyethylene glycol packet 17 g    potassium phosphate 15 mmol in D5W 250 mL infusion     tamsulosin 24 hr capsule 0.4 mg     Objective:     Vital Signs (Most Recent):  Temp: 98.5 °F (36.9 °C) (05/12/25 1106)  Pulse: 96 (05/12/25 1106)  Resp: 16 (05/12/25 1106)  BP: (!) 132/59 (05/12/25 1106)  SpO2: (!) 94 % (05/12/25 1106) Vital Signs (24h Range):  Temp:  [97.3 °F (36.3 °C)-99 °F (37.2 °C)] 98.5 °F (36.9 °C)  Pulse:  [] 96  Resp:  [16-19] 16  SpO2:  [92 %-98 %] 94 %  BP: ()/(42-86) 132/59     Weight: 79.4 kg (175 lb)  Height: 5' (152.4 cm)  Body mass index is 34.18 kg/m².      Intake/Output Summary (Last 24 hours) at 5/12/2025 1113  Last data filed at 5/11/2025 1949  Gross per 24 hour   Intake 360 ml   Output 1375 ml   Net -1015 ml       Physical Exam:   General the patient is alert and oriented x3 no acute distress nontoxic-appearing appropriate affect.    Constitutional: Vital signs are examined and stable.  Resp: No signs of labored breathing               LLE: -Skin: Dressing CDI, no strikethrough.           -MSK: Hip and Knee F/E, EHL/FHL, Gastroc/Tib anterior Strength 5/5; tolerates gentle passive ROM of the knee.            -Neuro:  Sensation intact to light touch L3-S1 dermatomes           -Lymphatic: No signs of lymphadenopathy           -CV: Capillary refill is less than 2 seconds. DP palpable. Compartments soft and compressible.         Diagnostic Findings:   Significant Labs:   Recent Lab Results  (Last 5 results in the past 72 hours)        05/12/25  0359   05/11/25  0538   05/10/25  1259   05/10/25  0905   05/10/25  0904        Albumin/Globulin Ratio 0.7   0.8   0.8           Albumin 2.2   2.4   2.6           ALP 68   79   102           ALT 33   101   206           Anion Gap 6.0   6.0   10.0           AST 31   71   219           Baso # 0.06   0.03             Basophil % 0.5   0.2             BILIRUBIN TOTAL 0.3   0.3   0.5           BUN 10.1   10.6   11.9           BUN/CREAT RATIO 18   18   18           Calcium 8.0   8.2   8.7           Chloride 107   105   105           CO2  27   25   22           Creatinine 0.56   0.59   0.67           CRP 54.60               eGFR >60  Comment: Estimated GFR calculated using the CKD-EPI creatinine (2021) equation.   >60  Comment: Estimated GFR calculated using the CKD-EPI creatinine (2021) equation.   >60  Comment: Estimated GFR calculated using the CKD-EPI creatinine (2021) equation.           Eos # 0.03   0.00             Eos % 0.2   0.0             Globulin, Total 3.3   2.9   3.3           Glucose 99   121   164           Gran # (ANC)               Hematocrit 19.9   23.9   28.3           Hemoglobin 6.1   7.6   9.0           Immature Grans (Abs) 0.10   0.09             Immature Granulocytes 0.8   0.5             Lactic Acid Level         1.7       Lymph # 3.53   2.46             LYMPH % 28.3   14.1             Lymphocyte %     5           Magnesium  1.90   1.90     2.00         MCH 29.2   29.6   29.9           MCHC 30.7   31.8   31.8           MCV 95.2   93.0   94.0           Mono # 1.05   1.54             Mono % 8.4   8.8   2           MPV 11.1   11.3   11.2           Neut # 7.69   13.36             Neut % 61.8   76.4             Neutrophils Relative     93           nRBC 0.0   0.0             nRBC %     1           Phosphorus Level 2.2   2.6     3.2         Platelet Estimate     Normal           Platelet Count 165   188   209           Potassium 3.8   4.0   4.4           Prealbumin 13.7  Comment: AM draw               PROTEIN TOTAL 5.5   5.3   5.9           RBC 2.09   2.57   3.01           RBC Morph     Normal           RDW 13.6   13.2   13.2           Sodium 140   136   137           WBC 12.46   17.48   25.23                                   Significant Imaging: I have reviewed and interpreted all pertinent imaging results/findings.     Assessment/Plan:     Active Diagnoses:    Diagnosis Date Noted POA    PRINCIPAL PROBLEM:  Open fracture of left distal femur [S72.402B] 05/10/2025 Yes      Problems Resolved During this Admission:     POD 2  ORIF/IMN left distal femur.   NWB, ROMAT. PT. Recommending high intensity therapy. Likely DC to reahab.   Continue multimodal pain control.   Lovenox for DVT ppx during stay and x 30 days at discharge followed by aspirin 81 mg BID.   Begin daily dry dressing changes-May cover with soft dressing or large band aid. Keep clean and dry. No showers to POD 7. Do not submerge. Do not apply ointments or creams.  ABLA related to femur fracture. Hgb 6.1 this am; transfusing  Follow up with Dr. Purcell in 3 weeks. Please call with questions or concerns.    The above findings, diagnostics, and treatment plan were discussed with Dr. Purcell who is in agreement with the plan of care except as stated in additional documentation.      ANIKA Stern  Orthopedic Trauma Surgery  [facility]

## 2025-05-12 NOTE — PT/OT/SLP PROGRESS
Physical Therapy Treatment    Patient Name:  Varsha Rg   MRN:  [id number]    Recommendations:     Discharge therapy intensity: High Intensity Therapy   Discharge Equipment Recommendations: walker, rolling  Barriers to discharge: Decreased caregiver support, Impaired mobility, and Ongoing medical needs    Assessment:     Varsha Rg is a 70 y.o. female admitted with a medical diagnosis of Distal femur fracture s/p ORIF left distal femur, left quad tendon repair, and excisional debridement of open fx left knee .  She presents with the following impairments/functional limitations: weakness, gait instability, impaired endurance, impaired balance, impaired self care skills, impaired functional mobility, decreased safety awareness, pain.    Patient required increased time for +BM on bedside commode.    Rehab Prognosis: Good; patient would benefit from acute skilled PT services to address these deficits and reach maximum level of function.    Recent Surgery: Procedure(s) (LRB):  INSERTION, INTRAMEDULLARY STANISLAW, FEMUR, DISTAL, RETROGRADE (Left)  ORIF, FRACTURE, FEMUR, TRANSCONDYLAR WITH INTERCONDYLAR EXTENSION (Left)  REPAIR,MUSCLE,QUADRICEPS OR HAMSTRING (Left)  DEBRIDEMENT, SKIN, FASCIA, MUSCLE, AND BONE, OPEN FRACTURE SITE (Left) 2 Days Post-Op    Plan:     During this hospitalization, patient would benefit from acute PT services 6 x/week to address the identified rehab impairments via gait training, therapeutic activities and progress toward the following goals:    Plan of Care Expires:  05/27/25    Subjective     Chief Complaint: pain in left thigh  Patient/Family Comments/goals: none stated  Pain/Comfort:  Pain Rating 1: other (see comments) (pain not rated)      Objective:     Communicated with nurse prior to session.  Patient found up in chair with dillard catheter, peripheral IV upon PT entry to room.     General Precautions: Standard, fall  Orthopedic Precautions: LLE non weight bearing  Braces:  N/A  Respiratory Status: Room air  Blood Pressure: 120/58mmHg   HR 99bpm  Skin Integrity: Visible skin intact      Functional Mobility:  Bed Mobility:     Scooting: moderate assistance  Supine to Sit: moderate assistance  Transfers:     Sit to Stand:  maximal assistance and of 2 persons with rolling walker  Bed to bedside commode to Chair: maximal assistance and of 2 persons with  hand-held assist  using  Stand Pivot    Education:  Patient provided with verbal education education regarding PT role/goals/POC, post-op precautions, fall prevention, and safety awareness.  Understanding was verbalized.     Patient left up in chair with all lines intact, call button in reach, neto pad in place, and  present    GOALS:   Multidisciplinary Problems       Physical Therapy Goals          Problem: Physical Therapy    Goal Priority Disciplines Outcome Interventions   Physical Therapy Goal     PT, PT/OT Progressing    Description: Pt will be seen for the following goals  1. Pt will perform bed mobility with dannie  2. Pt will perform transfers with a rw with dannie  3. Pt will amb 10ft with rw NWB LLE dannie                       Time Tracking:     PT Received On: 05/12/25  PT Start Time: 1424     PT Stop Time: 1502  PT Total Time (min): 38 min     Billable Minutes: Therapeutic Activity 38    Treatment Type: Treatment  PT/PTA: PTA     Number of PTA visits since last PT visit: 1     05/12/2025

## 2025-05-12 NOTE — PLAN OF CARE
05/12/25 1406   Discharge Assessment   Assessment Type Discharge Planning Assessment   Confirmed/corrected address, phone number and insurance Yes   Confirmed Demographics Correct on Facesheet   Source of Information patient   Communicated CASTILLO with patient/caregiver Date not available/Unable to determine   Reason For Admission fall   People in Home spouse   Do you expect to return to your current living situation? No   Do you have help at home or someone to help you manage your care at home? Yes   Who are your caregiver(s) and their phone number(s)? jade flores, spouse, [phone]   Prior to hospitilization cognitive status: Unable to Assess   Current cognitive status: Alert/Oriented   Home Layout Able to live on 1st floor   Equipment Currently Used at Home power chair;rollator   Readmission within 30 days? No   Patient currently being followed by outpatient case management? No   Do you currently have service(s) that help you manage your care at home? No   Do you take prescription medications? Yes   Do you have prescription coverage? Yes   Coverage [facility] managed mcr   Do you have any problems affording any of your prescribed medications? No   Is the patient taking medications as prescribed? yes   Who is going to help you get home at discharge? family   How do you get to doctors appointments? family or friend will provide;car, drives self   Are you on dialysis? No   Do you take coumadin? No   Discharge Plan A Rehab   Discharge Plan B Skilled Nursing Facility   DME Needed Upon Discharge  none   Discharge Plan discussed with: Patient   Transition of Care Barriers None   OTHER   Name(s) of People in Home spouse     Completed assessment with pt at bedside, spouse present. Introduced self and explained role as SW. Pt verb understanding to all questions asked. PCP is Amrit Vogt. Pt agreeable with rehab placement and requested referral be sent to [facility]. FOC signed. Referral sent to [facility] via Epic.     Brielle Brooks  LCSW

## 2025-05-12 NOTE — PROGRESS NOTES
Trauma Surgery   Daily Progress Note     HD#3  POD#2 Days Post-Op    Subjective  VSS, except soft BP  Hemoglobins 9-->7.6-->6.1  Leukocytosis resolvin.48-->12.46  Pain well-controlled   PT/OT   Was refusing Lovenox but taking now     Scheduled Meds:   acetaminophen  650 mg Oral Q4H    clonazePAM  4 mg Oral QHS    docusate sodium  100 mg Oral BID    enoxparin  40 mg Subcutaneous Q12H    gabapentin  300 mg Oral TID    methocarbamoL  500 mg Oral Q8H    mupirocin   Nasal BID    phenazopyridine  100 mg Oral TID WM    polyethylene glycol  17 g Oral BID    potassium phosphate IVPB  15 mmol Intravenous Once    tamsulosin  0.4 mg Oral Daily       Continuous Infusions:    PRN Meds:  Current Facility-Administered Medications:     0.9%  NaCl infusion (for blood administration), , Intravenous, Q24H PRN    dextrose 50%, 12.5 g, Intravenous, PRN    furosemide, 20 mg, Oral, Daily PRN    glucagon (human recombinant), 1 mg, Intramuscular, PRN    haloperidol lactate, 0.5 mg, Intravenous, Q10 Min PRN    magnesium hydroxide 400 mg/5 ml, 30 mL, Oral, Daily PRN    meclizine, 12.5 mg, Oral, TID PRN    melatonin, 6 mg, Oral, Nightly PRN    oxyCODONE, 5 mg, Oral, Q4H PRN    oxyCODONE, 10 mg, Oral, Q4H PRN     Objective  Temp:  [97.3 °F (36.3 °C)-99 °F (37.2 °C)] 97.3 °F (36.3 °C)  Pulse:  [] 108  Resp:  [16-19] 16  SpO2:  [91 %-98 %] 98 %  BP: ()/(42-86) 113/51     Gen: NAD, AAOx3  HEENT: EOMI, NCAT  CV: RR  Resp: no shortness of breath, normal WOB   Abd: soft, non-distended, NT  Ext:  Full ROM. No deformity.  Orthopedic dressing clean dry and intact     Labs  Recent Labs     25  1055 25  1605 05/10/25  1259 25  0538 25  0359   WBC 12.44*  --  25.23* 17.48* 12.46*   HGB 12.0  --  9.0* 7.6* 6.1*   HCT 37.5  --  28.3* 23.9* 19.9*     --  209 188 165   INR  --  1.0  --   --   --      Recent Labs     25  1055 05/10/25  0905 05/10/25  1259 25  0538 25  0359     --  137 136  "140   K 4.2  --  4.4 4.0 3.8     --  105 105 107   CO2 27  --  22* 25 27   BUN 17.5  --  11.9 10.6 10.1   CREATININE 0.77  --  0.67 0.59 0.56   *  --  164* 121* 99   CALCIUM 8.6  --  8.7 8.2* 8.0*   MG  --  2.00  --  1.90 1.90   PHOS  --  3.2  --  2.6 2.2*   PROT 6.7  --  5.9 5.3* 5.5*   ALBUMIN 3.3*  --  2.6* 2.4* 2.2*   BILITOT 0.5  --  0.5 0.3 0.3   AST 18  --  219* 71* 31   ALKPHOS 92  --  102 79 68   ALT 16  --  206* 101* 33     No results for input(s): "POCTGLUCOSE" in the last 72 hours.     Imaging  X-Ray Femur 2 View Left  Result Date: 5/10/2025  Restoration of near anatomic alignment following internal fixation of the distal femoral comminuted fracture. Electronically signed by: Bar Martines MD Date:    05/10/2025 Time:    13:28         Assessment/Plan    Anemia  H/H: 6.1/19.9  Will receive 2u pRBCs 5/12  Recheck H/H 2H after transfusion    Open left femur fracture ; s/p ORIF (L) distal femur fracture w/ intracondylar split, (L) knee quad tendon repair, excisional debridement open fraction (L) knee  Lovenox BID  Multimodal pain control   Nonweightbearing left lower extremity x 10 wks  We will need PT and OT   Anticipate home on aspirin  Home antihypertensives held for now  Perioperative Ancef    Urinary retention  Will continue Flomax  Day for now   Can restart voiding trials once more ambulatory    Fall   Regular diet   Multimodal pain control   Incentive spirometry   Disposition pending therapy evaluation today      Gabby Álvarez MD  Family Medicine Resident, -1      "

## 2025-05-12 NOTE — PLAN OF CARE
Problem: Occupational Therapy  Goal: Occupational Therapy Goal  Description: LTG: Pt will perform basic ADLs and ADL transfers with Modified independence using LRAD by discharge.    STG: to be met by 6/12/25    Pt will complete grooming standing at sink with LRAD with SBA.  Pt will complete UB dressing with SBA.  Pt will complete LB dressing with SBA using LRAD and AE prn.  Pt will complete toileting with SBA using LRAD.  Pt will complete functional mobility to/from toilet and toilet transfer with SBA using LRAD.   Outcome: Progressing

## 2025-05-13 ENCOUNTER — TELEPHONE (OUTPATIENT)
Dept: SURGERY | Facility: CLINIC | Age: 70
End: 2025-05-13
Payer: MEDICARE

## 2025-05-13 LAB
ALBUMIN SERPL-MCNC: 2.1 G/DL (ref 3.4–4.8)
ALBUMIN/GLOB SERPL: 0.7 RATIO (ref 1.1–2)
ALP SERPL-CCNC: 66 UNIT/L (ref 40–150)
ALT SERPL-CCNC: 23 UNIT/L (ref 0–55)
ANION GAP SERPL CALC-SCNC: 7 MEQ/L
AST SERPL-CCNC: 23 UNIT/L (ref 11–45)
BASOPHILS # BLD AUTO: 0.05 X10(3)/MCL
BASOPHILS NFR BLD AUTO: 0.5 %
BILIRUB SERPL-MCNC: 0.6 MG/DL
BUN SERPL-MCNC: 9.9 MG/DL (ref 9.8–20.1)
CALCIUM SERPL-MCNC: 7.7 MG/DL (ref 8.4–10.2)
CHLORIDE SERPL-SCNC: 107 MMOL/L (ref 98–107)
CO2 SERPL-SCNC: 26 MMOL/L (ref 23–31)
CREAT SERPL-MCNC: 0.56 MG/DL (ref 0.55–1.02)
CREAT/UREA NIT SERPL: 18
EOSINOPHIL # BLD AUTO: 0.14 X10(3)/MCL (ref 0–0.9)
EOSINOPHIL NFR BLD AUTO: 1.3 %
ERYTHROCYTE [DISTWIDTH] IN BLOOD BY AUTOMATED COUNT: 14.1 % (ref 11.5–17)
GFR SERPLBLD CREATININE-BSD FMLA CKD-EPI: >60 ML/MIN/1.73/M2
GLOBULIN SER-MCNC: 2.9 GM/DL (ref 2.4–3.5)
GLUCOSE SERPL-MCNC: 94 MG/DL (ref 82–115)
HCT VFR BLD AUTO: 27.2 % (ref 37–47)
HGB BLD-MCNC: 8.9 G/DL (ref 12–16)
IMM GRANULOCYTES # BLD AUTO: 0.09 X10(3)/MCL (ref 0–0.04)
IMM GRANULOCYTES NFR BLD AUTO: 0.8 %
LYMPHOCYTES # BLD AUTO: 3.25 X10(3)/MCL (ref 0.6–4.6)
LYMPHOCYTES NFR BLD AUTO: 30.4 %
MAGNESIUM SERPL-MCNC: 2.2 MG/DL (ref 1.6–2.6)
MCH RBC QN AUTO: 30.5 PG (ref 27–31)
MCHC RBC AUTO-ENTMCNC: 32.7 G/DL (ref 33–36)
MCV RBC AUTO: 93.2 FL (ref 80–94)
MONOCYTES # BLD AUTO: 0.88 X10(3)/MCL (ref 0.1–1.3)
MONOCYTES NFR BLD AUTO: 8.2 %
NEUTROPHILS # BLD AUTO: 6.28 X10(3)/MCL (ref 2.1–9.2)
NEUTROPHILS NFR BLD AUTO: 58.8 %
NRBC BLD AUTO-RTO: 0.3 %
PHOSPHATE SERPL-MCNC: 2.6 MG/DL (ref 2.3–4.7)
PLATELET # BLD AUTO: 161 X10(3)/MCL (ref 130–400)
PMV BLD AUTO: 10.8 FL (ref 7.4–10.4)
POTASSIUM SERPL-SCNC: 4.1 MMOL/L (ref 3.5–5.1)
PROT SERPL-MCNC: 5 GM/DL (ref 5.8–7.6)
RBC # BLD AUTO: 2.92 X10(6)/MCL (ref 4.2–5.4)
SODIUM SERPL-SCNC: 140 MMOL/L (ref 136–145)
WBC # BLD AUTO: 10.69 X10(3)/MCL (ref 4.5–11.5)

## 2025-05-13 PROCEDURE — 85025 COMPLETE CBC W/AUTO DIFF WBC: CPT

## 2025-05-13 PROCEDURE — 94760 N-INVAS EAR/PLS OXIMETRY 1: CPT

## 2025-05-13 PROCEDURE — 97530 THERAPEUTIC ACTIVITIES: CPT | Mod: CQ

## 2025-05-13 PROCEDURE — 97535 SELF CARE MNGMENT TRAINING: CPT | Mod: CO

## 2025-05-13 PROCEDURE — 63600175 PHARM REV CODE 636 W HCPCS

## 2025-05-13 PROCEDURE — 83735 ASSAY OF MAGNESIUM: CPT

## 2025-05-13 PROCEDURE — 99233 SBSQ HOSP IP/OBS HIGH 50: CPT | Mod: GC,,, | Performed by: STUDENT IN AN ORGANIZED HEALTH CARE EDUCATION/TRAINING PROGRAM

## 2025-05-13 PROCEDURE — 36415 COLL VENOUS BLD VENIPUNCTURE: CPT

## 2025-05-13 PROCEDURE — 99900035 HC TECH TIME PER 15 MIN (STAT)

## 2025-05-13 PROCEDURE — 25000003 PHARM REV CODE 250: Performed by: SURGERY

## 2025-05-13 PROCEDURE — 99900031 HC PATIENT EDUCATION (STAT)

## 2025-05-13 PROCEDURE — 84100 ASSAY OF PHOSPHORUS: CPT

## 2025-05-13 PROCEDURE — 80053 COMPREHEN METABOLIC PANEL: CPT

## 2025-05-13 PROCEDURE — 25000003 PHARM REV CODE 250

## 2025-05-13 PROCEDURE — 25000003 PHARM REV CODE 250: Performed by: NURSE PRACTITIONER

## 2025-05-13 PROCEDURE — 21400001 HC TELEMETRY ROOM

## 2025-05-13 PROCEDURE — 25000003 PHARM REV CODE 250: Performed by: STUDENT IN AN ORGANIZED HEALTH CARE EDUCATION/TRAINING PROGRAM

## 2025-05-13 PROCEDURE — 94799 UNLISTED PULMONARY SVC/PX: CPT

## 2025-05-13 RX ORDER — OXYCODONE HYDROCHLORIDE 5 MG/1
5 TABLET ORAL EVERY 4 HOURS PRN
Refills: 0 | Status: DISCONTINUED | OUTPATIENT
Start: 2025-05-13 | End: 2025-05-19 | Stop reason: HOSPADM

## 2025-05-13 RX ADMIN — METHOCARBAMOL 500 MG: 500 TABLET ORAL at 02:05

## 2025-05-13 RX ADMIN — METHOCARBAMOL 500 MG: 500 TABLET ORAL at 08:05

## 2025-05-13 RX ADMIN — DOCUSATE SODIUM 100 MG: 100 CAPSULE, LIQUID FILLED ORAL at 08:05

## 2025-05-13 RX ADMIN — ENOXAPARIN SODIUM 40 MG: 40 INJECTION SUBCUTANEOUS at 08:05

## 2025-05-13 RX ADMIN — ACETAMINOPHEN 325MG 650 MG: 325 TABLET ORAL at 06:05

## 2025-05-13 RX ADMIN — ACETAMINOPHEN 325MG 650 MG: 325 TABLET ORAL at 02:05

## 2025-05-13 RX ADMIN — OXYCODONE HYDROCHLORIDE 10 MG: 10 TABLET ORAL at 09:05

## 2025-05-13 RX ADMIN — TAMSULOSIN HYDROCHLORIDE 0.4 MG: 0.4 CAPSULE ORAL at 08:05

## 2025-05-13 RX ADMIN — CLONAZEPAM 4 MG: 1 TABLET ORAL at 08:05

## 2025-05-13 RX ADMIN — METHOCARBAMOL 500 MG: 500 TABLET ORAL at 06:05

## 2025-05-13 RX ADMIN — MUPIROCIN: 20 OINTMENT TOPICAL at 08:05

## 2025-05-13 RX ADMIN — ACETAMINOPHEN 325MG 650 MG: 325 TABLET ORAL at 09:05

## 2025-05-13 RX ADMIN — POLYETHYLENE GLYCOL 3350 17 G: 17 POWDER, FOR SOLUTION ORAL at 08:05

## 2025-05-13 RX ADMIN — ACETAMINOPHEN 325MG 650 MG: 325 TABLET ORAL at 05:05

## 2025-05-13 RX ADMIN — PHENAZOPYRIDINE HYDROCHLORIDE 100 MG: 100 TABLET ORAL at 08:05

## 2025-05-13 RX ADMIN — ACETAMINOPHEN 325MG 650 MG: 325 TABLET ORAL at 08:05

## 2025-05-13 RX ADMIN — PHENAZOPYRIDINE HYDROCHLORIDE 100 MG: 100 TABLET ORAL at 12:05

## 2025-05-13 RX ADMIN — PHENAZOPYRIDINE HYDROCHLORIDE 100 MG: 100 TABLET ORAL at 05:05

## 2025-05-13 NOTE — TELEPHONE ENCOUNTER
----- Message from Med Assistant Oracio sent at 5/12/2025  4:10 PM CDT -----  Regarding: voicemail  Pt left a vm stating that she is currently in the hospital and has questions about her vitamins while inpatientPer Guillermina- I called pt to let her know that she didn't have to worry about taking them while she was there, it was okay to hold off on them for a bitPt then stated she fractured her femur in 3 different places so she may be in the hospital for 10+ weeks.

## 2025-05-13 NOTE — PLAN OF CARE
F/u with Jaci at [facility] this AM regarding pt referral. Stated they will submit for auth today.     Brielle Brooks LCSW

## 2025-05-13 NOTE — PROGRESS NOTES
"[facility]  Orthopedics  Progress Note    Patient Name: Varsha Rg  MRN: [id number]  Admission Date: 5/9/2025  Hospital Length of Stay: 4 days  Attending Provider: Dk Dunbar MD  Primary Care Provider: Amrit Vogt II, MD  Follow-up For: Procedure(s) (LRB):  INSERTION, INTRAMEDULLARY STANISLAW, FEMUR, DISTAL, RETROGRADE (Left)  ORIF, FRACTURE, FEMUR, TRANSCONDYLAR WITH INTERCONDYLAR EXTENSION (Left)  REPAIR,MUSCLE,QUADRICEPS OR HAMSTRING (Left)  DEBRIDEMENT, SKIN, FASCIA, MUSCLE, AND BONE, OPEN FRACTURE SITE (Left)    Post-Operative Day: 1 Day Post-Op  Subjective:     Principal Problem:Open fracture of left distal femur    Principal Orthopedic Problem: 3 Days Post-Op   ORIF left open distal femur fracture.     Interval History: Was able to work with therapy but feels like "I can't do anything" discussed that this is the reason for rehab to get safe for home. Hgb stable this am.     Review of patient's allergies indicates:   Allergen Reactions    Azithromycin Shortness Of Breath    Hydrocodone-acetaminophen Hallucinations    Pcn [penicillins]     Rosuvastatin     Levofloxacin Palpitations       Current Facility-Administered Medications   Medication    0.9%  NaCl infusion (for blood administration)    acetaminophen tablet 650 mg    clonazePAM tablet 4 mg    dextrose 50% injection 12.5 g    docusate sodium capsule 100 mg    enoxaparin injection 40 mg    furosemide tablet 20 mg    gabapentin capsule 300 mg    glucagon (human recombinant) injection 1 mg    haloperidol lactate injection 0.5 mg    magnesium hydroxide 400 mg/5 ml suspension 2,400 mg    meclizine tablet 12.5 mg    melatonin tablet 6 mg    methocarbamoL tablet 500 mg    mupirocin 2 % ointment    oxyCODONE immediate release tablet 5 mg    oxyCODONE immediate release tablet Tab 10 mg    phenazopyridine tablet 100 mg    polyethylene glycol packet 17 g    tamsulosin 24 hr capsule 0.4 mg     Objective:     Vital Signs (Most " Recent):  Temp: 98 °F (36.7 °C) (05/13/25 0933)  Pulse: 82 (05/13/25 0731)  Resp: 18 (05/13/25 0934)  BP: 126/76 (05/13/25 0731)  SpO2: 95 % (05/13/25 0812) Vital Signs (24h Range):  Temp:  [97.8 °F (36.6 °C)-99.1 °F (37.3 °C)] 98 °F (36.7 °C)  Pulse:  [] 82  Resp:  [16-18] 18  SpO2:  [93 %-98 %] 95 %  BP: (122-150)/(52-77) 126/76     Weight: 79.4 kg (175 lb)  Height: 5' (152.4 cm)  Body mass index is 34.18 kg/m².      Intake/Output Summary (Last 24 hours) at 5/13/2025 1024  Last data filed at 5/13/2025 0934  Gross per 24 hour   Intake 1087.08 ml   Output 3700 ml   Net -2612.92 ml       Physical Exam:   General the patient is alert and oriented x3 no acute distress nontoxic-appearing appropriate affect.    Constitutional: Vital signs are examined and stable.  Resp: No signs of labored breathing               LLE: -Skin: Dressing CDI, no strikethrough.           -MSK: Hip and Knee F/E, EHL/FHL, Gastroc/Tib anterior Strength 5/5; tolerates gentle passive ROM of the knee.            -Neuro:  Sensation intact to light touch L3-S1 dermatomes           -Lymphatic: No signs of lymphadenopathy           -CV: Capillary refill is less than 2 seconds. DP palpable. Compartments soft and compressible.         Diagnostic Findings:   Significant Labs:   Recent Lab Results  (Last 5 results in the past 72 hours)        05/13/25  0404   05/12/25  1925   05/12/25  1637   05/12/25  0359   05/11/25  0538        Albumin/Globulin Ratio 0.7       0.7   0.8       Albumin 2.1       2.2   2.4       ALP 66       68   79       ALT 23       33   101       Anion Gap 7.0       6.0   6.0       AST 23       31   71       Baso # 0.05   0.09     0.06   0.03       Basophil % 0.5   0.7     0.5   0.2       BILIRUBIN TOTAL 0.6       0.3   0.3       BUN 9.9       10.1   10.6       BUN/CREAT RATIO 18       18   18       Calcium 7.7       8.0   8.2       Chloride 107       107   105       CO2 26       27   25       Creatinine 0.56       0.56   0.59        CRP       54.60         eGFR >60  Comment: Estimated GFR calculated using the CKD-EPI creatinine (2021) equation.       >60  Comment: Estimated GFR calculated using the CKD-EPI creatinine (2021) equation.   >60  Comment: Estimated GFR calculated using the CKD-EPI creatinine (2021) equation.       Eos # 0.14   0.06     0.03   0.00       Eos % 1.3   0.5     0.2   0.0       Globulin, Total 2.9       3.3   2.9       Glucose 94       99   121       Hematocrit 27.2   28.6   28.1   19.9   23.9       Hemoglobin 8.9   9.4   9.1   6.1   7.6       Immature Grans (Abs) 0.09   0.12     0.10   0.09       Immature Granulocytes 0.8   0.9     0.8   0.5       Lymph # 3.25   2.79     3.53   2.46       LYMPH % 30.4   21.9     28.3   14.1       Magnesium  2.20       1.90   1.90       MCH 30.5   30.2     29.2   29.6       MCHC 32.7   32.9     30.7   31.8       MCV 93.2   92.0     95.2   93.0       Mono # 0.88   1.13     1.05   1.54       Mono % 8.2   8.9     8.4   8.8       MPV 10.8   10.8     11.1   11.3       Neut # 6.28   8.54     7.69   13.36       Neut % 58.8   67.1     61.8   76.4       nRBC 0.3   0.4     0.0   0.0       Phosphorus Level 2.6       2.2   2.6       Platelet Count 161   160     165   188       Potassium 4.1       3.8   4.0       Prealbumin       13.7  Comment: AM draw         PROTEIN TOTAL 5.0       5.5   5.3       RBC 2.92   3.11     2.09   2.57       RDW 14.1   13.9     13.6   13.2       Sodium 140       140   136       WBC 10.69   12.73     12.46   17.48                               Significant Imaging: I have reviewed and interpreted all pertinent imaging results/findings.     Assessment/Plan:     Active Diagnoses:    Diagnosis Date Noted POA    PRINCIPAL PROBLEM:  Open fracture of left distal femur [S72.402B] 05/10/2025 Yes      Problems Resolved During this Admission:     POD 3 ORIF/IMN left distal femur.   NWB, ROMAT. PT. Recommending high intensity therapy. Likely DC to reab.   Continue multimodal pain  control.   Lovenox for DVT ppx during stay and x 30 days at discharge followed by aspirin 81 mg BID.   Begin daily dry dressing changes-May cover with soft dressing or large band aid. Keep clean and dry. No showers to POD 7. Do not submerge. Do not apply ointments or creams.  ABLA related to femur fracture. Hgb stable following transfusion   Follow up with Dr. Purcell in 3 weeks. Please call with questions or concerns.    The above findings, diagnostics, and treatment plan were discussed with Dr. Purcell who is in agreement with the plan of care except as stated in additional documentation.      Katy Small, ANIKA  Orthopedic Trauma Surgery  [facility]

## 2025-05-13 NOTE — PLAN OF CARE
Problem: Adult Inpatient Plan of Care  Goal: Plan of Care Review  Outcome: Progressing  Goal: Patient-Specific Goal (Individualized)  Outcome: Progressing  Goal: Absence of Hospital-Acquired Illness or Injury  Outcome: Progressing  Intervention: Identify and Manage Fall Risk  Flowsheets (Taken 5/13/2025 1307)  Safety Promotion/Fall Prevention:   assistive device/personal item within reach   Fall Risk reviewed with patient/family   Fall Risk signage in place   family expresses understanding of fall risk and prevention   family to remain at bedside   instructed to call staff for mobility   in recliner, wheels locked   high risk medications identified   lighting adjusted   medications reviewed   nonskid shoes/socks when out of bed  Goal: Optimal Comfort and Wellbeing  Outcome: Progressing  Intervention: Provide Person-Centered Care  Flowsheets (Taken 5/13/2025 1305)  Trust Relationship/Rapport:   choices provided   care explained  Goal: Readiness for Transition of Care  Outcome: Progressing     Problem: Diabetes Comorbidity  Goal: Blood Glucose Level Within Targeted Range  Outcome: Progressing     Problem: Skin Injury Risk Increased  Goal: Skin Health and Integrity  Outcome: Progressing     Problem: Infection  Goal: Absence of Infection Signs and Symptoms  Outcome: Progressing     Problem: Wound  Goal: Optimal Coping  Outcome: Progressing  Goal: Optimal Functional Ability  Outcome: Progressing  Goal: Absence of Infection Signs and Symptoms  Outcome: Progressing  Goal: Improved Oral Intake  Outcome: Progressing  Goal: Optimal Pain Control and Function  Outcome: Progressing  Intervention: Prevent or Manage Pain  Flowsheets (Taken 5/13/2025 1308)  Sleep/Rest Enhancement: family presence promoted  Pain Management Interventions:   care clustered   cold applied   medication offered   pain management plan reviewed with patient/caregiver   position adjusted   pillow support provided  Goal: Skin Health and Integrity  Outcome:  Progressing  Goal: Optimal Wound Healing  Outcome: Progressing

## 2025-05-13 NOTE — PROGRESS NOTES
Trauma Surgery   Daily Progress Note     HD#4  POD#3 Days Post-Op    Subjective  VSS, AF  Hemoglobins 9-->7.6-->6.1-->8.9 s/p 2U of pRBCS on 5/12  Leukocytosis resolved: 17.48-->12.46-->10.69  Pain not controlled yet; patient has not been taking pain medicine   PT/OT        Scheduled Meds:   acetaminophen  650 mg Oral Q4H    clonazePAM  4 mg Oral QHS    docusate sodium  100 mg Oral BID    enoxparin  40 mg Subcutaneous Q12H    gabapentin  300 mg Oral TID    methocarbamoL  500 mg Oral Q8H    mupirocin   Nasal BID    phenazopyridine  100 mg Oral TID WM    polyethylene glycol  17 g Oral BID    tamsulosin  0.4 mg Oral Daily       Continuous Infusions:    PRN Meds:  Current Facility-Administered Medications:     0.9%  NaCl infusion (for blood administration), , Intravenous, Q24H PRN    dextrose 50%, 12.5 g, Intravenous, PRN    furosemide, 20 mg, Oral, Daily PRN    glucagon (human recombinant), 1 mg, Intramuscular, PRN    haloperidol lactate, 0.5 mg, Intravenous, Q10 Min PRN    magnesium hydroxide 400 mg/5 ml, 30 mL, Oral, Daily PRN    meclizine, 12.5 mg, Oral, TID PRN    melatonin, 6 mg, Oral, Nightly PRN    oxyCODONE, 5 mg, Oral, Q4H PRN    oxyCODONE, 10 mg, Oral, Q4H PRN     Objective  Temp:  [97.8 °F (36.6 °C)-99.1 °F (37.3 °C)] 98 °F (36.7 °C)  Pulse:  [] 82  Resp:  [16-18] 18  SpO2:  [93 %-98 %] 95 %  BP: (122-150)/(52-77) 126/76     Gen: NAD, AAOx3  HEENT: EOMI, NCAT  CV: RR  Resp: no shortness of breath, normal WOB   Abd: soft, non-distended, NT  Ext:  tolerates gentle passive ROM of the knee;  Orthopedic dressing over (L) knee w/ mild strikethrough bleeding present      Labs  Recent Labs     05/11/25  0538 05/12/25  0359 05/12/25  1637 05/12/25  1925 05/13/25  0404   WBC 17.48* 12.46*  --  12.73* 10.69   HGB 7.6* 6.1* 9.1* 9.4* 8.9*   HCT 23.9* 19.9* 28.1* 28.6* 27.2*    165  --  160 161     Recent Labs     05/10/25  1259 05/11/25  0538 05/12/25  0359 05/13/25  0404    136 140 140   K 4.4 4.0  "3.8 4.1    105 107 107   CO2 22* 25 27 26   BUN 11.9 10.6 10.1 9.9   CREATININE 0.67 0.59 0.56 0.56   * 121* 99 94   CALCIUM 8.7 8.2* 8.0* 7.7*   MG  --  1.90 1.90 2.20   PHOS  --  2.6 2.2* 2.6   PROT 5.9 5.3* 5.5* 5.0*   ALBUMIN 2.6* 2.4* 2.2* 2.1*   BILITOT 0.5 0.3 0.3 0.6   * 71* 31 23   ALKPHOS 102 79 68 66   * 101* 33 23     No results for input(s): "POCTGLUCOSE" in the last 72 hours.     Imaging  X-Ray Femur 2 View Left  Result Date: 5/10/2025  Restoration of near anatomic alignment following internal fixation of the distal femoral comminuted fracture. Electronically signed by: Bar Martines MD Date:    05/10/2025 Time:    13:28         Assessment/Plan    Anemia  H/H: 8.9/27.2 on 5/13  Received 2u pRBCs 5/12      Open left femur fracture ; POD3---s/p ORIF (L) distal femur fracture w/ intracondylar split, (L) knee quad tendon repair, excisional debridement open fraction (L) knee  Lovenox BID  Multimodal pain control   Nonweightbearing left lower extremity x 10 wks  Continue PT and OT; PT/OT rec high intensity therapy   Anticipate home on aspirin  Home antihypertensives held for now  Perioperative Ancef    Urinary retention  Will continue Flomax  Will continue dillard  Can restart voiding trials once ambulating more    Fall   Regular diet   Multimodal pain control   Incentive spirometry   Disposition: pending rehab placement (LGO)      Gabby Álvarez MD  Family Medicine Resident, -1      "

## 2025-05-13 NOTE — PT/OT/SLP PROGRESS
Occupational Therapy   Treatment    Name: Varsha Rg  MRN: [id number]    Recommendations:     Recommended therapy intensity at discharge: High Intensity Therapy   Discharge Equipment Recommendations:  bath bench, walker, rolling  Barriers to discharge:       Assessment:     Varsha Rg is a 70 y.o. female with a medical diagnosis of  L distal femur fx s/p ORIF & quad tendon repair; injuries are result of a fall. Performance deficits affecting function are weakness, impaired endurance, impaired self care skills, impaired functional mobility, gait instability, impaired balance, decreased safety awareness, decreased lower extremity function, decreased upper extremity function, orthopedic precautions. Pt limited by LLE pain during session. Required heavy VC for adherence to NWB pxns.     Rehab Prognosis:  Good; patient would benefit from acute skilled OT services to address these deficits and reach maximum level of function.       Plan:     Patient to be seen 6 x/week to address the above listed problems via self-care/home management, therapeutic activities, therapeutic exercises  Plan of Care Expires: 06/12/25  Plan of Care Reviewed with: patient, spouse    Subjective     Pain/Comfort:  Location - Side 1: Left  Location 1: leg  Pain Addressed 1: Reposition, Distraction    Objective:     Communicated with: RN prior to session.  Patient found HOB elevated with dillard catheter upon OT entry to room.    General Precautions: Standard, fall    Orthopedic Precautions:LLE non weight bearing  Braces: N/A  Respiratory Status: Room air     Occupational Performance:     Bed Mobility:    Patient completed Scooting/Bridging with moderate assistance and 2 persons  Patient completed Supine to Sit with moderate assistance and 2 persons     Functional Mobility/Transfers:  Patient completed Sit <> Stand Transfer with moderate assistance and of 2 persons  with  rolling walker and from EOB with LLE elevated on therapist foot for  adherence to pxns.    Patient completed Toilet Transfer Stand Pivot technique with moderate assistance and of 2 persons with  no AD to BSC.   Performed stand pivot t/f from BSC<>chair with Mod A x2.     Activities of Daily Living:  Toileting: Max A for posterior pericare after +BM.     Therapeutic Positioning    OT interventions performed during the course of today's session in an effort to prevent and/or reduce acquired pressure injuries:   Education was provided on benefits of and recommendations for therapeutic positioning      [facility] 6 Click ADL: 17    Patient Education:  Patient provided with verbal education education regarding OT role/goals/POC, post op precautions, fall prevention, and safety awareness.  Understanding was verbalized.      Patient left up in chair with all lines intact, call button in reach, and neto pad in place.    GOALS:   Multidisciplinary Problems       Occupational Therapy Goals          Problem: Occupational Therapy    Goal Priority Disciplines Outcome Interventions   Occupational Therapy Goal     OT, PT/OT Progressing    Description: LTG: Pt will perform basic ADLs and ADL transfers with Modified independence using LRAD by discharge.    STG: to be met by 6/12/25    Pt will complete grooming standing at sink with LRAD with SBA.  Pt will complete UB dressing with SBA.  Pt will complete LB dressing with SBA using LRAD and AE prn.  Pt will complete toileting with SBA using LRAD.  Pt will complete functional mobility to/from toilet and toilet transfer with SBA using LRAD.                        Time Tracking:     OT Date of Treatment: 05/13/25  OT Start Time: 0928  OT Stop Time: 1008  OT Total Time (min): 40 min    Billable Minutes:Self Care/Home Management 40    OT/SHARIFA: SHARIFA     Number of SHARIFA visits since last OT visit: 1    5/13/2025

## 2025-05-13 NOTE — PT/OT/SLP PROGRESS
Physical Therapy Treatment    Patient Name:  Varsha Rg   MRN:  [id number]    Recommendations:     Discharge therapy intensity: High Intensity Therapy   Discharge Equipment Recommendations: walker, rolling  Barriers to discharge: Decreased caregiver support, Impaired mobility, and Ongoing medical needs    Assessment:     Varsha Rg is a 70 y.o. female admitted with a medical diagnosis of Distal femur fracture s/p ORIF left distal femur, left quad tendon repair, and excisional debridement of open fx left knee .  She presents with the following impairments/functional limitations: weakness, gait instability, impaired endurance, impaired balance, impaired self care skills, impaired functional mobility, decreased safety awareness, pain.    Patient required increased time for + BM on bedside commode.    Rehab Prognosis: Good; patient would benefit from acute skilled PT services to address these deficits and reach maximum level of function.    Recent Surgery: Procedure(s) (LRB):  INSERTION, INTRAMEDULLARY STANISLAW, FEMUR, DISTAL, RETROGRADE (Left)  ORIF, FRACTURE, FEMUR, TRANSCONDYLAR WITH INTERCONDYLAR EXTENSION (Left)  REPAIR,MUSCLE,QUADRICEPS OR HAMSTRING (Left)  DEBRIDEMENT, SKIN, FASCIA, MUSCLE, AND BONE, OPEN FRACTURE SITE (Left) 3 Days Post-Op    Plan:     During this hospitalization, patient would benefit from acute PT services 6 x/week to address the identified rehab impairments via gait training, therapeutic activities and progress toward the following goals:    Plan of Care Expires:  05/27/25    Subjective     Chief Complaint: pain in left thigh  Patient/Family Comments/goals: none stated  Pain/Comfort:  Pain Rating 1: other (see comments) (pain in L LE not rated)      Objective:     Communicated with nurse prior to session.  Patient found up in chair with dillard catheter, peripheral IV upon PT entry to room.     General Precautions: Standard, fall  Orthopedic Precautions: LLE non weight bearing  Braces:  N/A  Respiratory Status: Room air  Skin Integrity: Visible skin intact    Functional Mobility:  Bed Mobility:     Scooting: moderate assistance x2  Supine to Sit: moderate assistance x2  Transfers:   Sit to Stand:  moderate assistance and of 2 persons with rolling walker  Bed to bedside commode to Chair: moderate assistance and of 2 persons with  hand-held assist  using  Stand Pivot    Education:  Patient provided with verbal education education regarding PT role/goals/POC, post-op precautions, fall prevention, and safety awareness.  Understanding was verbalized.     Patient left up in chair with all lines intact, call button in reach, neto pad in place, and  present    GOALS:   Multidisciplinary Problems       Physical Therapy Goals          Problem: Physical Therapy    Goal Priority Disciplines Outcome Interventions   Physical Therapy Goal     PT, PT/OT Progressing    Description: Pt will be seen for the following goals  1. Pt will perform bed mobility with dannie  2. Pt will perform transfers with a rw with dannie  3. Pt will amb 10ft with rw VESTA ARAGON dannie                       Time Tracking:     PT Received On: 05/13/25  PT Start Time: 0928     PT Stop Time: 1008  PT Total Time (min): 40 min     Billable Minutes: Therapeutic Activity 40    Treatment Type: Treatment  PT/PTA: PTA     Number of PTA visits since last PT visit: 2     05/13/2025

## 2025-05-14 ENCOUNTER — TELEPHONE (OUTPATIENT)
Dept: INTERNAL MEDICINE | Facility: CLINIC | Age: 70
End: 2025-05-14
Payer: MEDICARE

## 2025-05-14 LAB
ALBUMIN SERPL-MCNC: 2.3 G/DL (ref 3.4–4.8)
ALBUMIN/GLOB SERPL: 0.7 RATIO (ref 1.1–2)
ALP SERPL-CCNC: 101 UNIT/L (ref 40–150)
ALT SERPL-CCNC: 63 UNIT/L (ref 0–55)
ANION GAP SERPL CALC-SCNC: 7 MEQ/L
AST SERPL-CCNC: 75 UNIT/L (ref 11–45)
BASOPHILS # BLD AUTO: 0.07 X10(3)/MCL
BASOPHILS NFR BLD AUTO: 0.6 %
BILIRUB SERPL-MCNC: 0.8 MG/DL
BUN SERPL-MCNC: 9.7 MG/DL (ref 9.8–20.1)
CALCIUM SERPL-MCNC: 7.9 MG/DL (ref 8.4–10.2)
CHLORIDE SERPL-SCNC: 107 MMOL/L (ref 98–107)
CO2 SERPL-SCNC: 24 MMOL/L (ref 23–31)
CREAT SERPL-MCNC: 0.57 MG/DL (ref 0.55–1.02)
CREAT/UREA NIT SERPL: 17
EOSINOPHIL # BLD AUTO: 0.21 X10(3)/MCL (ref 0–0.9)
EOSINOPHIL NFR BLD AUTO: 1.9 %
ERYTHROCYTE [DISTWIDTH] IN BLOOD BY AUTOMATED COUNT: 14.1 % (ref 11.5–17)
GFR SERPLBLD CREATININE-BSD FMLA CKD-EPI: >60 ML/MIN/1.73/M2
GLOBULIN SER-MCNC: 3.1 GM/DL (ref 2.4–3.5)
GLUCOSE SERPL-MCNC: 96 MG/DL (ref 82–115)
HCT VFR BLD AUTO: 30.6 % (ref 37–47)
HGB BLD-MCNC: 9.6 G/DL (ref 12–16)
IMM GRANULOCYTES # BLD AUTO: 0.05 X10(3)/MCL (ref 0–0.04)
IMM GRANULOCYTES NFR BLD AUTO: 0.4 %
LYMPHOCYTES # BLD AUTO: 2.78 X10(3)/MCL (ref 0.6–4.6)
LYMPHOCYTES NFR BLD AUTO: 24.8 %
MAGNESIUM SERPL-MCNC: 2.1 MG/DL (ref 1.6–2.6)
MCH RBC QN AUTO: 30.2 PG (ref 27–31)
MCHC RBC AUTO-ENTMCNC: 31.4 G/DL (ref 33–36)
MCV RBC AUTO: 96.2 FL (ref 80–94)
MONOCYTES # BLD AUTO: 0.87 X10(3)/MCL (ref 0.1–1.3)
MONOCYTES NFR BLD AUTO: 7.8 %
NEUTROPHILS # BLD AUTO: 7.24 X10(3)/MCL (ref 2.1–9.2)
NEUTROPHILS NFR BLD AUTO: 64.5 %
NRBC BLD AUTO-RTO: 0.2 %
PHOSPHATE SERPL-MCNC: 3.2 MG/DL (ref 2.3–4.7)
PLATELET # BLD AUTO: 186 X10(3)/MCL (ref 130–400)
PMV BLD AUTO: 9.9 FL (ref 7.4–10.4)
POTASSIUM SERPL-SCNC: 4.3 MMOL/L (ref 3.5–5.1)
PROT SERPL-MCNC: 5.4 GM/DL (ref 5.8–7.6)
RBC # BLD AUTO: 3.18 X10(6)/MCL (ref 4.2–5.4)
SODIUM SERPL-SCNC: 138 MMOL/L (ref 136–145)
WBC # BLD AUTO: 11.22 X10(3)/MCL (ref 4.5–11.5)

## 2025-05-14 PROCEDURE — 97535 SELF CARE MNGMENT TRAINING: CPT | Mod: CO

## 2025-05-14 PROCEDURE — 99900031 HC PATIENT EDUCATION (STAT)

## 2025-05-14 PROCEDURE — 84100 ASSAY OF PHOSPHORUS: CPT

## 2025-05-14 PROCEDURE — 25000003 PHARM REV CODE 250

## 2025-05-14 PROCEDURE — 85025 COMPLETE CBC W/AUTO DIFF WBC: CPT

## 2025-05-14 PROCEDURE — 99900035 HC TECH TIME PER 15 MIN (STAT)

## 2025-05-14 PROCEDURE — 80053 COMPREHEN METABOLIC PANEL: CPT

## 2025-05-14 PROCEDURE — 83735 ASSAY OF MAGNESIUM: CPT

## 2025-05-14 PROCEDURE — 21400001 HC TELEMETRY ROOM

## 2025-05-14 PROCEDURE — 97530 THERAPEUTIC ACTIVITIES: CPT

## 2025-05-14 PROCEDURE — 97110 THERAPEUTIC EXERCISES: CPT

## 2025-05-14 PROCEDURE — 36415 COLL VENOUS BLD VENIPUNCTURE: CPT

## 2025-05-14 PROCEDURE — 25000003 PHARM REV CODE 250: Performed by: STUDENT IN AN ORGANIZED HEALTH CARE EDUCATION/TRAINING PROGRAM

## 2025-05-14 PROCEDURE — 63600175 PHARM REV CODE 636 W HCPCS

## 2025-05-14 PROCEDURE — 25000003 PHARM REV CODE 250: Performed by: SURGERY

## 2025-05-14 PROCEDURE — 99233 SBSQ HOSP IP/OBS HIGH 50: CPT | Mod: GC,,, | Performed by: STUDENT IN AN ORGANIZED HEALTH CARE EDUCATION/TRAINING PROGRAM

## 2025-05-14 PROCEDURE — 94799 UNLISTED PULMONARY SVC/PX: CPT | Mod: XB

## 2025-05-14 PROCEDURE — 25000003 PHARM REV CODE 250: Performed by: NURSE PRACTITIONER

## 2025-05-14 RX ADMIN — ACETAMINOPHEN 325MG 650 MG: 325 TABLET ORAL at 04:05

## 2025-05-14 RX ADMIN — ENOXAPARIN SODIUM 40 MG: 40 INJECTION SUBCUTANEOUS at 10:05

## 2025-05-14 RX ADMIN — CLONAZEPAM 4 MG: 1 TABLET ORAL at 10:05

## 2025-05-14 RX ADMIN — ACETAMINOPHEN 325MG 650 MG: 325 TABLET ORAL at 09:05

## 2025-05-14 RX ADMIN — DOCUSATE SODIUM 100 MG: 100 CAPSULE, LIQUID FILLED ORAL at 08:05

## 2025-05-14 RX ADMIN — ACETAMINOPHEN 325MG 650 MG: 325 TABLET ORAL at 01:05

## 2025-05-14 RX ADMIN — DOCUSATE SODIUM 100 MG: 100 CAPSULE, LIQUID FILLED ORAL at 10:05

## 2025-05-14 RX ADMIN — PHENAZOPYRIDINE HYDROCHLORIDE 100 MG: 100 TABLET ORAL at 11:05

## 2025-05-14 RX ADMIN — POLYETHYLENE GLYCOL 3350 17 G: 17 POWDER, FOR SOLUTION ORAL at 10:05

## 2025-05-14 RX ADMIN — METHOCARBAMOL 500 MG: 500 TABLET ORAL at 01:05

## 2025-05-14 RX ADMIN — ENOXAPARIN SODIUM 40 MG: 40 INJECTION SUBCUTANEOUS at 08:05

## 2025-05-14 RX ADMIN — MUPIROCIN: 20 OINTMENT TOPICAL at 11:05

## 2025-05-14 RX ADMIN — OXYCODONE HYDROCHLORIDE 5 MG: 5 TABLET ORAL at 08:05

## 2025-05-14 RX ADMIN — MUPIROCIN: 20 OINTMENT TOPICAL at 10:05

## 2025-05-14 RX ADMIN — POLYETHYLENE GLYCOL 3350 17 G: 17 POWDER, FOR SOLUTION ORAL at 08:05

## 2025-05-14 RX ADMIN — PHENAZOPYRIDINE HYDROCHLORIDE 100 MG: 100 TABLET ORAL at 08:05

## 2025-05-14 RX ADMIN — METHOCARBAMOL 500 MG: 500 TABLET ORAL at 10:05

## 2025-05-14 RX ADMIN — TAMSULOSIN HYDROCHLORIDE 0.4 MG: 0.4 CAPSULE ORAL at 08:05

## 2025-05-14 RX ADMIN — METHOCARBAMOL 500 MG: 500 TABLET ORAL at 04:05

## 2025-05-14 RX ADMIN — PHENAZOPYRIDINE HYDROCHLORIDE 100 MG: 100 TABLET ORAL at 05:05

## 2025-05-14 NOTE — PLAN OF CARE
Problem: Adult Inpatient Plan of Care  Goal: Plan of Care Review  Outcome: Progressing  Goal: Patient-Specific Goal (Individualized)  Outcome: Progressing  Goal: Absence of Hospital-Acquired Illness or Injury  Outcome: Progressing  Goal: Optimal Comfort and Wellbeing  Outcome: Progressing  Intervention: Monitor Pain and Promote Comfort  Flowsheets (Taken 5/14/2025 1510)  Pain Management Interventions:   medication offered but refused   care clustered  Goal: Readiness for Transition of Care  Outcome: Progressing     Problem: Diabetes Comorbidity  Goal: Blood Glucose Level Within Targeted Range  Outcome: Progressing     Problem: Skin Injury Risk Increased  Goal: Skin Health and Integrity  Outcome: Progressing  Intervention: Optimize Skin Protection  Flowsheets (Taken 5/14/2025 1510)  Pressure Reduction Techniques:   frequent weight shift encouraged   positioned off wounds  Pressure Reduction Devices: positioning supports utilized  Skin Protection: incontinence pads utilized  Activity Management: Rolling - L1     Problem: Infection  Goal: Absence of Infection Signs and Symptoms  Outcome: Progressing     Problem: Wound  Goal: Optimal Coping  Outcome: Progressing  Goal: Optimal Functional Ability  Outcome: Progressing  Goal: Absence of Infection Signs and Symptoms  Outcome: Progressing  Goal: Improved Oral Intake  Outcome: Progressing  Goal: Optimal Pain Control and Function  Outcome: Progressing  Goal: Skin Health and Integrity  Outcome: Progressing  Goal: Optimal Wound Healing  Outcome: Progressing

## 2025-05-14 NOTE — PROGRESS NOTES
"Trauma Surgery   Daily Progress Note     HD#5  POD#4 Days Post-Op    Subjective  VSS, AF  Hemoglobin improved   Day in place   Resting comfortably       Scheduled Meds:   acetaminophen  650 mg Oral Q4H    clonazePAM  4 mg Oral QHS    docusate sodium  100 mg Oral BID    enoxparin  40 mg Subcutaneous Q12H    methocarbamoL  500 mg Oral Q8H    mupirocin   Nasal BID    phenazopyridine  100 mg Oral TID WM    polyethylene glycol  17 g Oral BID    tamsulosin  0.4 mg Oral Daily       Continuous Infusions:    PRN Meds:  Current Facility-Administered Medications:     dextrose 50%, 12.5 g, Intravenous, PRN    furosemide, 20 mg, Oral, Daily PRN    magnesium hydroxide 400 mg/5 ml, 30 mL, Oral, Daily PRN    meclizine, 12.5 mg, Oral, TID PRN    melatonin, 6 mg, Oral, Nightly PRN    oxyCODONE, 5 mg, Oral, Q4H PRN     Objective  Temp:  [98 °F (36.7 °C)-98.6 °F (37 °C)] 98.2 °F (36.8 °C)  Pulse:  [70-95] 95  Resp:  [18] 18  SpO2:  [94 %-96 %] 95 %  BP: (113-134)/(50-82) 134/61     Gen: NAD, AAOx3  HEENT: EOMI, NCAT  CV: RR  Resp: no shortness of breath, normal WOB   Abd: soft, non-distended, NT  Ext:  Orthopedic dressing over (L) knee w/ mild strikethrough bleeding present      Labs  Recent Labs     05/12/25  0359 05/12/25  1637 05/12/25  1925 05/13/25  0404 05/14/25  0418   WBC 12.46*  --  12.73* 10.69 11.22   HGB 6.1* 9.1* 9.4* 8.9* 9.6*   HCT 19.9* 28.1* 28.6* 27.2* 30.6*     --  160 161 186     Recent Labs     05/12/25  0359 05/13/25  0404 05/14/25  0418    140 138   K 3.8 4.1 4.3    107 107   CO2 27 26 24   BUN 10.1 9.9 9.7*   CREATININE 0.56 0.56 0.57   GLU 99 94 96   CALCIUM 8.0* 7.7* 7.9*   MG 1.90 2.20 2.10   PHOS 2.2* 2.6 3.2   PROT 5.5* 5.0* 5.4*   ALBUMIN 2.2* 2.1* 2.3*   BILITOT 0.3 0.6 0.8   AST 31 23 75*   ALKPHOS 68 66 101   ALT 33 23 63*     No results for input(s): "POCTGLUCOSE" in the last 72 hours.     Imaging  X-Ray Femur 2 View Left  Result Date: 5/10/2025  Restoration of near anatomic " alignment following internal fixation of the distal femoral comminuted fracture. Electronically signed by: Bar Martines MD Date:    05/10/2025 Time:    13:28         Assessment/Plan    Anemia  H/H: 9.6 from 8.9  Received 2u pRBCs 5/12      Open left femur fracture ; POD3---s/p ORIF (L) distal femur fracture w/ intracondylar split, (L) knee quad tendon repair, excisional debridement open fraction (L) knee  Lovenox BID  Multimodal pain control   Nonweightbearing left lower extremity x 10 wks  Continue PT and OT; PT/OT rec high intensity therapy   Anticipate home on aspirin  Home antihypertensives held for now  Perioperative Ancef    Urinary retention  Will continue Flomax  Will continue dillard  Can restart voiding trials once ambulating more    Fall   Regular diet   Multimodal pain control   Incentive spirometry   Disposition: pending rehab placement (LGO)      Tico Samson

## 2025-05-14 NOTE — PT/OT/SLP PROGRESS
Physical Therapy Treatment    Patient Name:  Varsha Rg   MRN:  [id number]    Recommendations:     Discharge therapy intensity: High Intensity Therapy   Discharge Equipment Recommendations: bath bench, walker, rolling  Barriers to discharge: None    Assessment:     Varsha Rg is a 70 y.o. female admitted with a medical diagnosis of  Distal femur fracture s/p ORIF left distal femur, left quad tendon repair, and excisional debridement of open fx left knee .  She presents with the following impairments/functional limitations: weakness, impaired endurance, impaired self care skills, impaired functional mobility, gait instability, impaired balance, decreased lower extremity function, decreased safety awareness, pain, orthopedic precautions.    Rehab Prognosis: Good; patient would benefit from acute skilled PT services to address these deficits and reach maximum level of function.    Recent Surgery: Procedure(s) (LRB):  INSERTION, INTRAMEDULLARY STANISLAW, FEMUR, DISTAL, RETROGRADE (Left)  ORIF, FRACTURE, FEMUR, TRANSCONDYLAR WITH INTERCONDYLAR EXTENSION (Left)  REPAIR,MUSCLE,QUADRICEPS OR HAMSTRING (Left)  DEBRIDEMENT, SKIN, FASCIA, MUSCLE, AND BONE, OPEN FRACTURE SITE (Left) 4 Days Post-Op    Plan:     During this hospitalization, patient would benefit from acute PT services 6 x/week to address the identified rehab impairments via gait training, therapeutic activities, therapeutic exercises, neuromuscular re-education and progress toward the following goals:    Plan of Care Expires:  05/27/25    Subjective     Chief Complaint: pain  Patient/Family Comments/goals: none  Pain/Comfort:  Pain Rating 1: 6/10  Location - Side 1: Left  Location 1: leg  Pain Addressed 1: Reposition, Distraction  Pain Rating Post-Intervention 1: 6/10      Objective:     Communicated with nurse prior to session.  Patient found supine with dillard catheter upon PT entry to room.     General Precautions: Standard, fall  Orthopedic Precautions:  LLE non weight bearing  Braces: N/A  Respiratory Status: Room air  Skin Integrity: Visible skin intact      Functional Mobility:  Transfers:  Sit to Stand:  moderate assistance - maximal assistance, and of 2 persons with rolling walker. Performed x 7 sit<>stands. Cueing for technique. Rest break between each attempt  Balance: fair. Able to stand for ~ 1 minute each time    Therapeutic Exercises:  Patient performed seated Marches, Heel Raises, LAQ, Glute Sets, Resisted Hip ABD/ADD. All performed 2 x 20 reps.    Education Provided:  Role and goals of PT, transfer training, bed mobility, gait training, balance training, safety awareness, assistive device, strengthening exercises, and importance of participating in PT to return to PLOF.    Patient left up in chair with all lines intact, call button in reach, and neto pad in place    GOALS:   Multidisciplinary Problems       Physical Therapy Goals          Problem: Physical Therapy    Goal Priority Disciplines Outcome Interventions   Physical Therapy Goal     PT, PT/OT Progressing    Description: Pt will be seen for the following goals  1. Pt will perform bed mobility with dannie  2. Pt will perform transfers with a rw with dannie  3. Pt will amb 10ft with rw NWB LLE dannie                       Time Tracking:     PT Received On: 05/14/25  PT Start Time: 1315     PT Stop Time: 1339  PT Total Time (min): 24 min     Billable Minutes: Therapeutic Activity 9 minutes and Therapeutic Exercise 15 minutes    Treatment Type: Treatment  PT/PTA: PT     Number of PTA visits since last PT visit: 3     05/14/2025

## 2025-05-14 NOTE — PT/OT/SLP PROGRESS
Occupational Therapy   Treatment    Name: Varsha Rg  MRN: [id number]    Recommendations:     Recommended therapy intensity at discharge: High Intensity Therapy   Discharge Equipment Recommendations:  bath bench, walker, rolling  Barriers to discharge:       Assessment:     Varsha Rg is a 70 y.o. female with a medical diagnosis of L distal femur fx s/p ORIF & quad tendon repair; injuries are result of a fall. Performance deficits affecting function are weakness, impaired endurance, impaired self care skills, impaired functional mobility, gait instability, impaired balance, decreased safety awareness, decreased lower extremity function, decreased upper extremity function, orthopedic precautions. Pt motivated to participate and tolerated session well. Limited by LLE pain.     Rehab Prognosis:  Good; patient would benefit from acute skilled OT services to address these deficits and reach maximum level of function.       Plan:     Patient to be seen 6 x/week to address the above listed problems via self-care/home management, therapeutic activities, therapeutic exercises  Plan of Care Expires: 06/12/25  Plan of Care Reviewed with: patient, spouse    Subjective     Pain/Comfort:  Location - Side 1: Left  Location 1: leg  Pain Addressed 1: Reposition, Distraction    Objective:     Communicated with: RN prior to session.  Patient found HOB elevated with dillard catheter upon OT entry to room.    General Precautions: Standard, fall    Orthopedic Precautions:LLE non weight bearing  Braces: N/A  Respiratory Status: Room air     Occupational Performance:     Bed Mobility:    Patient completed Scooting/Bridging with minimum assistance  Patient completed Supine to Sit with minimum assistance   Required assist to manage LLE.     Functional Mobility/Transfers:  Patient completed Sit <> Stand Transfer with moderate assistance and of 2 persons  with  rolling walker  x2 trials from EOB.  LLE elevated on therapist foot for  adherence to pxns.   Performed stand pivot t/f from bed<>chair with Mod A x2.     Activities of Daily Living:  Grooming: pt completed oral hygiene, washed face and combed hair while seated at sink with SBA.   LE dressing: Max A to don socks.     Therapeutic Positioning    OT interventions performed during the course of today's session in an effort to prevent and/or reduce acquired pressure injuries:   Education was provided on benefits of and recommendations for therapeutic positioning  Therapeutic positioning was provided at the conclusion of session for pain management    [facility] 6 Click ADL: 18    Patient Education:  Patient and spouse were provided with verbal education education regarding OT role/goals/POC, post op precautions, fall prevention, and safety awareness.  Understanding was verbalized.      Patient left up in chair with all lines intact, call button in reach, neto pad in place, and  present.    GOALS:   Multidisciplinary Problems       Occupational Therapy Goals          Problem: Occupational Therapy    Goal Priority Disciplines Outcome Interventions   Occupational Therapy Goal     OT, PT/OT Progressing    Description: LTG: Pt will perform basic ADLs and ADL transfers with Modified independence using LRAD by discharge.    STG: to be met by 6/12/25    Pt will complete grooming standing at sink with LRAD with SBA.  Pt will complete UB dressing with SBA.  Pt will complete LB dressing with SBA using LRAD and AE prn.  Pt will complete toileting with SBA using LRAD.  Pt will complete functional mobility to/from toilet and toilet transfer with SBA using LRAD.                        Time Tracking:     OT Date of Treatment: 05/14/25  OT Start Time: 0803  OT Stop Time: 0829  OT Total Time (min): 26 min    Billable Minutes:Self Care/Home Management 26    OT/SHARIFA: SHARIFA     Number of SHARIFA visits since last OT visit: 2    5/14/2025

## 2025-05-14 NOTE — PLAN OF CARE
F/u with Jaci at [facility], pt still pending auth for rehab placement at this time.     Brielle Brooks, LCSW    1240 Insurance offering P:P for rehab placement. Deadline to complete P:P is today at 5pm CST. Contact info to complete P:P forwarded to trauma NP.    1500 Denial upheld for IPR placement. Referral sent to to TCU via [facility], as that was pt's second choice for placement.

## 2025-05-14 NOTE — TELEPHONE ENCOUNTER
Copied from CRM #[id number]. Topic: General Inquiry - Patient Advice  >> May 14, 2025  8:53 AM Rianna wrote:  Who Called: Varsha Rg    Caller is requesting assistance/information from provider's office.    Symptoms (please be specific): n/a   How long has patient had these symptoms:  n/a  List of preferred pharmacies on file (remove unneeded): n/a      Preferred Method of Contact: Phone Call  Patient's Preferred Phone Number on File: [phone]   Best Call Back Number, if different:  Additional Information: Pt stated that she is in the hospital due to having a shattered femur and wanted to make Dr. Vogt aware. Pt is at [facility]. Pt stated that she did have surgery on 5/10/25. Pt is requesting a call from the nurse for advise and to talk about the rehab center she will be going too. Please advise.

## 2025-05-15 LAB
ALBUMIN SERPL-MCNC: 2.1 G/DL (ref 3.4–4.8)
ALBUMIN/GLOB SERPL: 0.7 RATIO (ref 1.1–2)
ALP SERPL-CCNC: 96 UNIT/L (ref 40–150)
ALT SERPL-CCNC: 73 UNIT/L (ref 0–55)
ANION GAP SERPL CALC-SCNC: 7 MEQ/L
AST SERPL-CCNC: 76 UNIT/L (ref 11–45)
BASOPHILS # BLD AUTO: 0.05 X10(3)/MCL
BASOPHILS NFR BLD AUTO: 0.5 %
BILIRUB SERPL-MCNC: 0.8 MG/DL
BUN SERPL-MCNC: 9 MG/DL (ref 9.8–20.1)
CALCIUM SERPL-MCNC: 7.9 MG/DL (ref 8.4–10.2)
CHLORIDE SERPL-SCNC: 107 MMOL/L (ref 98–107)
CO2 SERPL-SCNC: 24 MMOL/L (ref 23–31)
CREAT SERPL-MCNC: 0.55 MG/DL (ref 0.55–1.02)
CREAT/UREA NIT SERPL: 16
CRP SERPL-MCNC: 45.5 MG/L
EOSINOPHIL # BLD AUTO: 0.14 X10(3)/MCL (ref 0–0.9)
EOSINOPHIL NFR BLD AUTO: 1.4 %
ERYTHROCYTE [DISTWIDTH] IN BLOOD BY AUTOMATED COUNT: 14.6 % (ref 11.5–17)
GFR SERPLBLD CREATININE-BSD FMLA CKD-EPI: >60 ML/MIN/1.73/M2
GLOBULIN SER-MCNC: 3 GM/DL (ref 2.4–3.5)
GLUCOSE SERPL-MCNC: 100 MG/DL (ref 82–115)
HCT VFR BLD AUTO: 28.6 % (ref 37–47)
HGB BLD-MCNC: 8.9 G/DL (ref 12–16)
IMM GRANULOCYTES # BLD AUTO: 0.07 X10(3)/MCL (ref 0–0.04)
IMM GRANULOCYTES NFR BLD AUTO: 0.7 %
LYMPHOCYTES # BLD AUTO: 2.55 X10(3)/MCL (ref 0.6–4.6)
LYMPHOCYTES NFR BLD AUTO: 25.2 %
MCH RBC QN AUTO: 30.4 PG (ref 27–31)
MCHC RBC AUTO-ENTMCNC: 31.1 G/DL (ref 33–36)
MCV RBC AUTO: 97.6 FL (ref 80–94)
MONOCYTES # BLD AUTO: 0.92 X10(3)/MCL (ref 0.1–1.3)
MONOCYTES NFR BLD AUTO: 9.1 %
NEUTROPHILS # BLD AUTO: 6.4 X10(3)/MCL (ref 2.1–9.2)
NEUTROPHILS NFR BLD AUTO: 63.1 %
NRBC BLD AUTO-RTO: 0 %
PLATELET # BLD AUTO: 205 X10(3)/MCL (ref 130–400)
PMV BLD AUTO: 10.2 FL (ref 7.4–10.4)
POTASSIUM SERPL-SCNC: 4 MMOL/L (ref 3.5–5.1)
PREALB SERPL-MCNC: 15.4 MG/DL (ref 14–37)
PROT SERPL-MCNC: 5.1 GM/DL (ref 5.8–7.6)
RBC # BLD AUTO: 2.93 X10(6)/MCL (ref 4.2–5.4)
SODIUM SERPL-SCNC: 138 MMOL/L (ref 136–145)
WBC # BLD AUTO: 10.13 X10(3)/MCL (ref 4.5–11.5)

## 2025-05-15 PROCEDURE — 21400001 HC TELEMETRY ROOM

## 2025-05-15 PROCEDURE — 36415 COLL VENOUS BLD VENIPUNCTURE: CPT | Performed by: NURSE PRACTITIONER

## 2025-05-15 PROCEDURE — 84134 ASSAY OF PREALBUMIN: CPT | Performed by: NURSE PRACTITIONER

## 2025-05-15 PROCEDURE — 97110 THERAPEUTIC EXERCISES: CPT | Mod: CQ

## 2025-05-15 PROCEDURE — 25000003 PHARM REV CODE 250: Performed by: STUDENT IN AN ORGANIZED HEALTH CARE EDUCATION/TRAINING PROGRAM

## 2025-05-15 PROCEDURE — 25000003 PHARM REV CODE 250: Performed by: NURSE PRACTITIONER

## 2025-05-15 PROCEDURE — 25000003 PHARM REV CODE 250

## 2025-05-15 PROCEDURE — 86140 C-REACTIVE PROTEIN: CPT | Performed by: NURSE PRACTITIONER

## 2025-05-15 PROCEDURE — 99233 SBSQ HOSP IP/OBS HIGH 50: CPT | Mod: GC,,, | Performed by: STUDENT IN AN ORGANIZED HEALTH CARE EDUCATION/TRAINING PROGRAM

## 2025-05-15 PROCEDURE — 97535 SELF CARE MNGMENT TRAINING: CPT | Mod: CO

## 2025-05-15 PROCEDURE — 11000001 HC ACUTE MED/SURG PRIVATE ROOM

## 2025-05-15 PROCEDURE — 85025 COMPLETE CBC W/AUTO DIFF WBC: CPT | Performed by: NURSE PRACTITIONER

## 2025-05-15 PROCEDURE — 80053 COMPREHEN METABOLIC PANEL: CPT | Performed by: NURSE PRACTITIONER

## 2025-05-15 PROCEDURE — 63600175 PHARM REV CODE 636 W HCPCS

## 2025-05-15 PROCEDURE — 97530 THERAPEUTIC ACTIVITIES: CPT | Mod: CQ

## 2025-05-15 RX ORDER — ACETAMINOPHEN 325 MG/1
650 TABLET ORAL EVERY 4 HOURS PRN
Status: DISCONTINUED | OUTPATIENT
Start: 2025-05-15 | End: 2025-05-19 | Stop reason: HOSPADM

## 2025-05-15 RX ORDER — BISACODYL 10 MG/1
10 SUPPOSITORY RECTAL DAILY PRN
Status: DISCONTINUED | OUTPATIENT
Start: 2025-05-15 | End: 2025-05-19 | Stop reason: HOSPADM

## 2025-05-15 RX ADMIN — PHENAZOPYRIDINE HYDROCHLORIDE 100 MG: 100 TABLET ORAL at 05:05

## 2025-05-15 RX ADMIN — DOCUSATE SODIUM 100 MG: 100 CAPSULE, LIQUID FILLED ORAL at 09:05

## 2025-05-15 RX ADMIN — ACETAMINOPHEN 650 MG: 325 TABLET ORAL at 03:05

## 2025-05-15 RX ADMIN — CLONAZEPAM 4 MG: 1 TABLET ORAL at 09:05

## 2025-05-15 RX ADMIN — POLYETHYLENE GLYCOL 3350 17 G: 17 POWDER, FOR SOLUTION ORAL at 09:05

## 2025-05-15 RX ADMIN — ACETAMINOPHEN 650 MG: 325 TABLET ORAL at 01:05

## 2025-05-15 RX ADMIN — MUPIROCIN: 20 OINTMENT TOPICAL at 09:05

## 2025-05-15 RX ADMIN — TAMSULOSIN HYDROCHLORIDE 0.4 MG: 0.4 CAPSULE ORAL at 09:05

## 2025-05-15 RX ADMIN — METHOCARBAMOL 500 MG: 500 TABLET ORAL at 09:05

## 2025-05-15 RX ADMIN — METHOCARBAMOL 500 MG: 500 TABLET ORAL at 05:05

## 2025-05-15 RX ADMIN — OXYCODONE HYDROCHLORIDE 5 MG: 5 TABLET ORAL at 09:05

## 2025-05-15 RX ADMIN — ENOXAPARIN SODIUM 40 MG: 40 INJECTION SUBCUTANEOUS at 09:05

## 2025-05-15 RX ADMIN — METHOCARBAMOL 500 MG: 500 TABLET ORAL at 03:05

## 2025-05-15 RX ADMIN — PHENAZOPYRIDINE HYDROCHLORIDE 100 MG: 100 TABLET ORAL at 09:05

## 2025-05-15 NOTE — PROGRESS NOTES
"Trauma Surgery   Daily Progress Note     HD#6  POD#5 Days Post-Op    Subjective  NAEON  AFVSS  Pain controlled with medication  Working with therapy, high intensity  Day clamp trials yesterday, possibly discontinue today  Tolerating regular diet, passing flatus, no BM, suppository ordered    Scheduled Meds:   clonazePAM  4 mg Oral QHS    docusate sodium  100 mg Oral BID    enoxparin  40 mg Subcutaneous Q12H    methocarbamoL  500 mg Oral Q8H    mupirocin   Nasal BID    phenazopyridine  100 mg Oral TID WM    polyethylene glycol  17 g Oral BID    tamsulosin  0.4 mg Oral Daily       Continuous Infusions:    PRN Meds:  Current Facility-Administered Medications:     acetaminophen, 650 mg, Oral, Q4H PRN    dextrose 50%, 12.5 g, Intravenous, PRN    furosemide, 20 mg, Oral, Daily PRN    magnesium hydroxide 400 mg/5 ml, 30 mL, Oral, Daily PRN    meclizine, 12.5 mg, Oral, TID PRN    melatonin, 6 mg, Oral, Nightly PRN    oxyCODONE, 5 mg, Oral, Q4H PRN     Objective  Temp:  [97.9 °F (36.6 °C)-99.3 °F (37.4 °C)] 98.5 °F (36.9 °C)  Pulse:  [] 88  Resp:  [16-18] 18  SpO2:  [91 %-96 %] 95 %  BP: (117-139)/(53-73) 128/73     Gen: NAD, AAOx3  HEENT: EOMI, NCAT  CV: RR  Resp: no shortness of breath, normal WOB   Abd: soft, non-distended, NT  Ext:  Orthopedic dressing to left knee CDI     Labs  Recent Labs     05/12/25 1925 05/13/25 0404 05/14/25 0418 05/15/25  0404   WBC 12.73* 10.69 11.22 10.13   HGB 9.4* 8.9* 9.6* 8.9*   HCT 28.6* 27.2* 30.6* 28.6*    161 186 205     Recent Labs     05/13/25 0404 05/14/25  0418 05/15/25  0404    138 138   K 4.1 4.3 4.0    107 107   CO2 26 24 24   BUN 9.9 9.7* 9.0*   CREATININE 0.56 0.57 0.55   GLU 94 96 100   CALCIUM 7.7* 7.9* 7.9*   MG 2.20 2.10  --    PHOS 2.6 3.2  --    PROT 5.0* 5.4* 5.1*   ALBUMIN 2.1* 2.3* 2.1*   BILITOT 0.6 0.8 0.8   AST 23 75* 76*   ALKPHOS 66 101 96   ALT 23 63* 73*     No results for input(s): "POCTGLUCOSE" in the last 72 hours. "     Imaging  X-Ray Femur 2 View Left  Result Date: 5/10/2025  Restoration of near anatomic alignment following internal fixation of the distal femoral comminuted fracture. Electronically signed by: Bar Martines MD Date:    05/10/2025 Time:    13:28         Assessment/Plan    Anemia  H/H: 8.9/28.6  Received 2u pRBCs 5/12    Open left femur fracture ; POD3---s/p ORIF (L) distal femur fracture w/ intracondylar split, (L) knee quad tendon repair, excisional debridement open fraction (L) knee  Lovenox BID  Multimodal pain control   Nonweightbearing left lower extremity x 10 wks  Continue PT and OT; PT/OT rec high intensity therapy   Anticipate home on aspirin  Home antihypertensives held for now  Perioperative Ancef    Urinary retention  Flomax  Voiding trials yesterday  Plans to dc today    Fall   Regular diet   Multimodal pain control   Incentive spirometry   Disposition: pending SNF placement       Debby SANCHEZ [place]  Trauma Surgery

## 2025-05-15 NOTE — PT/OT/SLP PROGRESS
Occupational Therapy   Treatment    Name: Varsha Rg  MRN: [id number]    Recommendations:     Recommended therapy intensity at discharge: High Intensity Therapy   Discharge Equipment Recommendations:  bath bench, walker, rolling  Barriers to discharge:       Assessment:     Varsha Rg is a 70 y.o. female with a medical diagnosis of L distal femur fx s/p ORIF & quad tendon repair; injuries are result of a fall. Performance deficits affecting function are weakness, impaired endurance, impaired self care skills, impaired functional mobility, gait instability, impaired balance, decreased safety awareness, decreased lower extremity function, decreased upper extremity function, orthopedic precautions. Pt requires increased time for all activities.     Rehab Prognosis:  Good; patient would benefit from acute skilled OT services to address these deficits and reach maximum level of function.       Plan:     Patient to be seen 6 x/week to address the above listed problems via self-care/home management, therapeutic activities, therapeutic exercises  Plan of Care Expires: 06/12/25  Plan of Care Reviewed with: patient, spouse    Subjective     Pain/Comfort:  Location - Side 1: Left  Location 1: leg  Pain Addressed 1: Reposition, Distraction    Objective:     Communicated with: RN prior to session.  Patient found up in chair with dillard catheter upon OT entry to room.    General Precautions: Standard, fall    Orthopedic Precautions:LLE non weight bearing  Braces: N/A  Respiratory Status: Room air     Occupational Performance:     Functional Mobility/Transfers:  Patient completed Sit <> Stand Transfer with moderate assistance and of 2 persons  with  rolling walker  x1 trial from chair and x1 trial from BSC.   Performed stand pivot t/f from chair<>BSC<>chair with Mod A x2 and RW. LLE elevated on therapist foot for adherence to pxns.     Activities of Daily Living:  Toileting: performed stand pivot t/f to BSC with Mod A x2.  Did not feel urge to void.     Therapeutic Positioning    OT interventions performed during the course of today's session in an effort to prevent and/or reduce acquired pressure injuries:   Education was provided on benefits of and recommendations for therapeutic positioning    [facility] 6 Click ADL: 18    Patient Education:  Patient and spouse were provided with verbal education education regarding OT role/goals/POC, post op precautions, fall prevention, and safety awareness.  Understanding was verbalized.      Patient left up in chair with all lines intact, call button in reach, neto pad in place, and  present.    GOALS:   Multidisciplinary Problems       Occupational Therapy Goals          Problem: Occupational Therapy    Goal Priority Disciplines Outcome Interventions   Occupational Therapy Goal     OT, PT/OT Progressing    Description: LTG: Pt will perform basic ADLs and ADL transfers with Modified independence using LRAD by discharge.    STG: to be met by 6/12/25    Pt will complete grooming standing at sink with LRAD with SBA.  Pt will complete UB dressing with SBA.  Pt will complete LB dressing with SBA using LRAD and AE prn.  Pt will complete toileting with SBA using LRAD.  Pt will complete functional mobility to/from toilet and toilet transfer with SBA using LRAD.                        Time Tracking:     OT Date of Treatment: 05/15/25  OT Start Time: 0917  OT Stop Time: 0940  OT Total Time (min): 23 min    Billable Minutes:Self Care/Home Management 23    OT/SHARIFA: SHARIFA     Number of SHARIFA visits since last OT visit: 3    5/15/2025

## 2025-05-15 NOTE — PT/OT/SLP PROGRESS
Physical Therapy Treatment    Patient Name:  Varsha Rg   MRN:  [id number]    Recommendations:     Discharge therapy intensity: High Intensity Therapy   Discharge Equipment Recommendations: to be determined by next level of care  Barriers to discharge: Impaired mobility    Assessment:     Varhsa Rg is a 70 y.o. female admitted with a medical diagnosis of Distal femur fracture s/p ORIF left distal femur, left quad tendon repair, and excisional debridement of open fx left knee . .  She presents with the following impairments/functional limitations: weakness, impaired endurance, impaired self care skills, impaired functional mobility, gait instability, impaired balance, decreased lower extremity function, decreased safety awareness, pain, orthopedic precautions .    Pt able to stand pivot with RW bedside chair<>BSC today modAx2. Pt with decreased safety awareness and remains a fall risk.  Rec high intensity therapy upon d/c to assist in increasing pt overall independence level and aid in getting her closer to PLOF.      Rehab Prognosis: Good; patient would benefit from acute skilled PT services to address these deficits and reach maximum level of function.    Recent Surgery: Procedure(s) (LRB):  INSERTION, INTRAMEDULLARY STANISLAW, FEMUR, DISTAL, RETROGRADE (Left)  ORIF, FRACTURE, FEMUR, TRANSCONDYLAR WITH INTERCONDYLAR EXTENSION (Left)  REPAIR,MUSCLE,QUADRICEPS OR HAMSTRING (Left)  DEBRIDEMENT, SKIN, FASCIA, MUSCLE, AND BONE, OPEN FRACTURE SITE (Left) 5 Days Post-Op    Plan:     During this hospitalization, patient would benefit from acute PT services 6 x/week to address the identified rehab impairments via gait training, therapeutic activities, therapeutic exercises, neuromuscular re-education and progress toward the following goals:    Plan of Care Expires:  05/27/25    Subjective     Chief Complaint:   Patient/Family Comments/goals:   Pain/Comfort:  Location - Side 1: Left  Location 1: leg  Pain Addressed 1:  Reposition, Nurse notified, Distraction      Objective:     Communicated with NSG prior to session.  Patient found up in chair with dillard catheter upon PT entry to room.     General Precautions: Standard, fall  Orthopedic Precautions: LLE non weight bearing  Braces: N/A  Respiratory Status: Room air  Blood Pressure:   Skin Integrity: Visible skin intact      Functional Mobility:  Transfers:     Sit to Stand:  moderate assistance and of 2 persons with rolling walker and 1 trial from bedside chair and 1 trial from BSC  Bed to Chair: moderate assistance and of 2 persons with  rolling walker  using  Stand Pivot and pt T/F BSC>bedside chair. Required constant steadying assist and vcs to maintain NWB on LLE. Pt sat too early and therapist had to assist her hips safely into chair   Toilet Transfer: moderate assistance and of 2 persons with  rolling walker  using  Stand Pivot and pt T/F bedside chair>BSC. Required constant steadying assist and vcs to maintain NWB on LLE. Pt sat too early and therapist had to assist her hips safely onto BSC    Therapeutic Activities/Exercises:  AROM/AAROM: ankle PF/DF, knee flex/ext, hip flex/ext, hip add/abd. 12 reps LLE      Patient left up in chair with all lines intact, call button in reach, and neto pad in place    GOALS:   Multidisciplinary Problems       Physical Therapy Goals          Problem: Physical Therapy    Goal Priority Disciplines Outcome Interventions   Physical Therapy Goal     PT, PT/OT Progressing    Description: Pt will be seen for the following goals  1. Pt will perform bed mobility with dannie  2. Pt will perform transfers with a rw with dannie  3. Pt will amb 10ft with rw NWB LLE dannie                       Time Tracking:     PT Received On: 05/15/25  PT Start Time: 0917     PT Stop Time: 0940  PT Total Time (min): 23 min     Billable Minutes: Therapeutic Activity 15 and Therapeutic Exercise 8    Treatment Type: Treatment  PT/PTA: PTA     Number of PTA visits since last  PT visit: 4     05/15/2025

## 2025-05-16 PROCEDURE — 97164 PT RE-EVAL EST PLAN CARE: CPT

## 2025-05-16 PROCEDURE — 99900035 HC TECH TIME PER 15 MIN (STAT)

## 2025-05-16 PROCEDURE — 25000003 PHARM REV CODE 250

## 2025-05-16 PROCEDURE — 63600175 PHARM REV CODE 636 W HCPCS

## 2025-05-16 PROCEDURE — 94799 UNLISTED PULMONARY SVC/PX: CPT

## 2025-05-16 PROCEDURE — 97535 SELF CARE MNGMENT TRAINING: CPT | Mod: CO

## 2025-05-16 PROCEDURE — 99232 SBSQ HOSP IP/OBS MODERATE 35: CPT | Mod: ,,, | Performed by: SURGERY

## 2025-05-16 PROCEDURE — 25000003 PHARM REV CODE 250: Performed by: NURSE PRACTITIONER

## 2025-05-16 PROCEDURE — 11000001 HC ACUTE MED/SURG PRIVATE ROOM

## 2025-05-16 RX ADMIN — OXYCODONE HYDROCHLORIDE 5 MG: 5 TABLET ORAL at 09:05

## 2025-05-16 RX ADMIN — METHOCARBAMOL 500 MG: 500 TABLET ORAL at 09:05

## 2025-05-16 RX ADMIN — TAMSULOSIN HYDROCHLORIDE 0.4 MG: 0.4 CAPSULE ORAL at 09:05

## 2025-05-16 RX ADMIN — METHOCARBAMOL 500 MG: 500 TABLET ORAL at 05:05

## 2025-05-16 RX ADMIN — CLONAZEPAM 4 MG: 1 TABLET ORAL at 09:05

## 2025-05-16 RX ADMIN — POLYETHYLENE GLYCOL 3350 17 G: 17 POWDER, FOR SOLUTION ORAL at 09:05

## 2025-05-16 RX ADMIN — ACETAMINOPHEN 650 MG: 325 TABLET ORAL at 03:05

## 2025-05-16 RX ADMIN — METHOCARBAMOL 500 MG: 500 TABLET ORAL at 03:05

## 2025-05-16 RX ADMIN — DOCUSATE SODIUM 100 MG: 100 CAPSULE, LIQUID FILLED ORAL at 09:05

## 2025-05-16 RX ADMIN — ENOXAPARIN SODIUM 40 MG: 40 INJECTION SUBCUTANEOUS at 09:05

## 2025-05-16 NOTE — PRE ADMISSION SCREENING
[facility]    Pre-Admission Patient Screening                    Pre-Screen type:  SNF:  Reason for Admission:    Fracture of lower left femur  Retention of urine    SNF Admission Criteria:    Primary: Rehab Services     Actively treated hospital diagnosis/diagnoses: N/A    Facility Status: Accept     Referring Physician:  Jordan Wheatley MD    Admitting Physician:  Jordan Wheatley MD    Primary Care Physician:  Amrit Vogt II, MD    History         Patient Active Problem List    Diagnosis Date Noted    Open fracture of left distal femur 05/10/2025    Annual physical exam 11/25/2024    Type 2 diabetes mellitus without complication, without long-term current use of insulin 03/25/2024    Recent URI 01/11/2024    Anxiety and depression 01/11/2024    Mixed hyperlipidemia 09/25/2023    Gastroesophageal reflux disease without esophagitis 09/25/2023    Systemic lupus erythematosus, unspecified SLE type, unspecified organ involvement status 03/27/2023    Morbid obesity 09/26/2022    Deep vein thrombosis (DVT) of proximal lower extremity, unspecified chronicity, unspecified laterality 09/26/2022         Previous Specialties/Consulted physicians:      Other: ortho      Past and Current Medical History    Past Medical History:   Diagnosis Date    Allergy ?    Years ago    Anxiety and depression 01/11/2024    Arthritis     Benign paroxysmal vertigo, bilateral     Chronic back pain     Clotting disorder 1975    BLOOD CLOTS IN LEGS    Concussion     Deep vein thrombosis 1974    9 day hospital stay both legs    Degenerative disc disease ?    DVT (deep venous thrombosis)     Encounter for blood transfusion 2017. And 2022    2 pints for each surgery    Fibromyalgia     GERD (gastroesophageal reflux disease)     History of syncope     Hypercholesteremia     Hypertension     IBS (irritable bowel syndrome)     Lymphedema     Osteoporosis     Personal history of colonic polyps 08/04/2015    Skin cancer      SOB (shortness of breath)     Systemic lupus erythematosus, organ or system involvement unspecified     Thyroid disease     Ulcer 1998    Unspecified cataract     Unspecified osteoarthritis, unspecified site            History of Present Illness     69 yo as a transfer from [facility].  She has an extensive past medical history.  Significant history includes history of DVT, GERD, hypertension, IBS and prior sleeve gastrectomy.  She reportedly fell in a parking lot of a [employer] after slipping in the rain.  She states her leg went one way and her ankle another.  Denies any loss of consciousness and denies being on any blood thinners.  She reports pain to the left leg.     05/10/25:  Preoperative Diagnosis: Left open severely comminuted distal femur fracture with intra-articular extension, severe morbid obesity  Postoperative Diagnosis: Same  Procedure:    1) Open reduction and internal fixation left distal femur fracture with intracondylar split - 65545  2) left knee quad tendon repair-CPT 16808  3) excisional debridement open fracture left knee-CPT 38907    Assessment/Plan     Anemia  MTH CBC  Received 2u pRBCs 5/12     Open left femur fracture ; POD3---s/p ORIF (L) distal femur fracture w/ intracondylar split, (L) knee quad tendon repair, excisional debridement open fraction (L) knee  Lovenox BID  Multimodal pain control   Nonweightbearing left lower extremity x 10 wks  Continue PT and OT; PT/OT rec high intensity therapy   Anticipate home on aspirin  Home antihypertensives held for now  Perioperative Ancef     Urinary retention  Flomax  Day dc'd 5/15, voiding spontaneously     Fall   Regular diet   Multimodal pain control   Incentive spirometry   Disposition: pending SNF placement          Patient Traveled outside of the [place] in the last 3 months? no     Patient discharged from this LTAC to SNF within the last 45 days? no    Patient discharged from this LTAC to Rehab within the last 27 days? no    Prior residence:  home    Prior Post-Acute Services: N/A     Allergies:   Review of patient's allergies indicates:   Allergen Reactions    Azithromycin Shortness Of Breath    Hydrocodone-acetaminophen Hallucinations    Pcn [penicillins]     Rosuvastatin     Levofloxacin Palpitations       Has patient received the current influenza vaccine (Oct 1 - March 31)? Unknown     Has patient received PPD skin test prior to admit? N/A     Code Status: Full Code    Orientation: Time, Place, and Person    Speech: normal     Vital Signs:     Date     Blood Pressure     Pulse     Respiratory Rate     O2 Saturation     Temperature         Bowel/Bladder: continent of bladder and continent of bowel  Bowel/Bladder Appliance: N/A    Dialysis: N/A         Peripheral IV - Single Lumen 05/09/25 1720 20 G Right;Left Wrist (Active)   Site Assessment Clean;Dry;Intact 05/16/25 0400   Line Securement Device Secured with sutureless device 05/16/25 0400   Extremity Assessment Distal to IV No abnormal discoloration;No redness;No swelling;No warmth 05/16/25 0400   Line Status Capped;Saline locked 05/16/25 0400   Dressing Status Clean;Dry;Intact 05/16/25 0400   Dressing Intervention Integrity maintained 05/16/25 0400   Reason Not Rotated Not due 05/14/25 0800   Number of days: 6            Peripheral IV - Single Lumen 05/12/25 0758 20 G Anterior;Right Wrist (Active)   Site Assessment Clean;Dry;Intact;No redness;No swelling;No warmth;No drainage 05/16/25 0400   Line Securement Device Secured with sutureless device 05/16/25 0400   Extremity Assessment Distal to IV No abnormal discoloration;No redness;No swelling;No warmth 05/16/25 0400   Line Status Capped;Saline locked 05/16/25 0400   Dressing Status Clean;Dry;Intact 05/16/25 0400   Dressing Intervention Integrity maintained 05/16/25 0400   Number of days: 4       CBGs/Accuchecks: No     Precautions: Fall    Restraints: No     Isolation Precautions: N/A       Facility-Administered Medications as of 5/16/2025    Medication Dose Route Frequency Provider Last Rate Last Admin    [] acetaminophen tablet 650 mg  650 mg Oral Q4H Froy Guerrero MD   650 mg at 25 1324    acetaminophen tablet 650 mg  650 mg Oral Q4H PRN Hanna Kelly NP   650 mg at 05/15/25 1527    bisacodyL suppository 10 mg  10 mg Rectal Daily PRN Debby Alvarado FNP        [COMPLETED] cefazolin (ANCEF) 2 gram in dextrose 5% 50 mL IVPB (premix)  2 g Intravenous Q8H Jordan Wheatley MD   Stopped at 25 0727    [COMPLETED] ceFAZolin injection 1 g  1 g Intravenous ED 1 Time Soledad Khan DO   1 g at 25 1146    [COMPLETED] cefTRIAXone injection 2 g  2 g Intravenous Q24H Froy Guerrero MD   2 g at 05/10/25 1623    clonazePAM tablet 4 mg  4 mg Oral QHS Tico Samson FNP   4 mg at 05/15/25 2154    dextrose 50% injection 12.5 g  12.5 g Intravenous PRN Wood Mcduffie MD        docusate sodium capsule 100 mg  100 mg Oral BID Froy Guerrero MD   100 mg at 25 0940    enoxaparin injection 40 mg  40 mg Subcutaneous Q12H Froy Guerrero MD   40 mg at 25 0940    furosemide tablet 20 mg  20 mg Oral Daily PRN Tico Samson FNP        [COMPLETED] LIDOcaine-prilocaine cream   Topical (Top) ED 1 Time Soledad Khan DO   Given at 25 1146    magnesium hydroxide 400 mg/5 ml suspension 2,400 mg  30 mL Oral Daily PRN Froy Guerrero MD   2,400 mg at 25 1214    meclizine tablet 12.5 mg  12.5 mg Oral TID PRN Tico Samson FNP        melatonin tablet 6 mg  6 mg Oral Nightly PRN Froy Guerrero MD        methocarbamoL tablet 500 mg  500 mg Oral Q8H Froy Guerrero MD   500 mg at 25 0539    [COMPLETED] morphine injection 2 mg  2 mg Intravenous ED 1 Time Soledad Khan DO   2 mg at 25 1056    [COMPLETED] morphine injection 4 mg  4 mg Intravenous ED 1 Time Soledad Khan, DO   4 mg at 25 1200    [] mupirocin 2 % ointment   Nasal BID Jordan Wheatley MD   Given at  05/15/25 2155    [COMPLETED] ondansetron injection 8 mg  8 mg Intravenous Once Soledad Khan DO   8 mg at 05/09/25 1103    oxyCODONE immediate release tablet 5 mg  5 mg Oral Q4H PRN Hanna Kelly NP   5 mg at 05/16/25 0940    polyethylene glycol packet 17 g  17 g Oral BID Froy Guerrero MD   17 g at 05/16/25 0940    [COMPLETED] potassium phosphate 15 mmol in D5W 250 mL infusion  15 mmol Intravenous Once Gabby Álvarez MD   Stopped at 05/12/25 1159    tamsulosin 24 hr capsule 0.4 mg  0.4 mg Oral Daily Tico Samson FNP   0.4 mg at 05/16/25 0940    [COMPLETED] Tdap vaccine injection 0.5 mL  0.5 mL Intramuscular vaccine x 1 dose Soledad Khan DO   0.5 mL at 05/09/25 1302     Outpatient Medications as of 5/16/2025   Medication Sig Dispense Refill    clonazePAM (KLONOPIN) 2 MG Tab TAKE TWO TABLETS BY MOUTH AT BEDTIME 60 tablet 3    furosemide (LASIX) 20 MG tablet Take 20 mg by mouth daily as needed.      irbesartan (AVAPRO) 75 MG tablet Take 75 mg by mouth. 1/2 tablet daily      meclizine (ANTIVERT) 12.5 mg tablet Take 12.5 mg by mouth 3 (three) times daily as needed.      protein supplement (PROTEIN ORAL) Take by mouth.          Cardiovascular:    Cardiovascular Review: Within definable limits (WDL)    Telemetry: No    Rhythm: N/A    AICD: No          Nutrition:      Ht Readings from Last 3 Encounters:   05/09/25 5' (1.524 m)   02/13/25 5' (1.524 m)   12/30/24 5' (1.524 m)     Wt Readings from Last 1 Encounters:   05/09/25 1404 79.4 kg (175 lb)       Feeding Status:   Current Diet: regular   Supplements:N/A   Tube Feeding: N/A   Flushes: N/A      Integumentary:    Integumentary: Within definable limits (WDL)              Wound 05/10/25 0838 Incision Left distal;anterior Thigh other (see comments) (Active)   05/10/25 0838 Thigh   Present on Original Admission: N   Primary Wound Type: Incision   Side: Left   Orientation: distal;anterior   Wound Approximate Age at First Assessment (Weeks):     Wound Number:    Is this injury device related?:    Incision Type: other (see comments)   Closure Method: Sutures;Staples   Wound Description (Comments):    Type:    Additional Comments: xeroform, island dressings x 3   Ankle-Brachial Index:    Pulses:    Removal Indication and Assessment:    Wound Outcome:    Incision WDL WDL 05/14/25 2000   Dressing Appearance Dry;Intact;Clean 05/15/25 2000   Drainage Amount None 05/15/25 2000   Drainage Characteristics/Odor Sanguineous 05/14/25 2000   Appearance [place] intact 05/15/25 2000   Dressing Changed;Island/border 05/15/25 2000   Number of days: 6            Incision/Site 06/14/22 0642 Right Eye (Active)   06/14/22 0642   Present Prior to Hospital Arrival?:    Side: Right   Location: Eye   Orientation:    Incision Type:    Closure Method:    Additional Comments:    Removal Indication and Assessment:    Wound Outcome:    Removal Indications:    Number of days: 1067            Incision/Site 06/28/22 1021 Left Eye (Active)   06/28/22 1021   Present Prior to Hospital Arrival?:    Side: Left   Location: Eye   Orientation:    Incision Type:    Closure Method:    Additional Comments:    Removal Indication and Assessment:    Wound Outcome:    Removal Indications:    Number of days: 1053         Musculoskeletal:    Transfer assist: Moderate Assistance    Weight Bearing Status: Non-Weight Bearing    Comments: N/A    ADL Assist: Moderate Assistance    Special Equipment: walker    Radiology:    Radiology (Last 168 hours)               05/10 1507 SURG FL Surgery Fluoro Usage       05/10 1325 X-Ray Femur 2 View Left       05/09 1135 CT Knee Without Contrast Left       05/09 1041 X-Ray Femur 2 View Left       05/09 1041 X-Ray Chest 1 View                SURG FL Surgery Fluoro Usage  See OP Notes for results.     IMPRESSION: See OP Notes for results.     This procedure was auto-finalized by: Virtual Radiologist  X-Ray Femur 2 View Left  Narrative: EXAMINATION:  Two radiographic views  "of the LEFT FEMUR.    CLINICAL HISTORY:  Other fracture of lower end of left femur, initial encounter for open fracture type IIIA, IIIB, or IIICpostop;    TECHNIQUE:  Two radiographic views of the LEFT FEMUR.    COMPARISON:  Left femur radiograph 05/09/2025.    FINDINGS:  Two views of the left femur demonstrate restoration of near anatomic alignment following internal fixation across the comminuted distal femoral fracture.  There is no new fracture.  There is expected surrounding soft tissue swelling.  Impression: Restoration of near anatomic alignment following internal fixation of the distal femoral comminuted fracture.    Electronically signed by: Bar Martines MD  Date:    05/10/2025  Time:    13:28      Lab/Cultures:    Blood Urea Nitrogen   Date Value Ref Range Status   05/15/2025 9.0 (L) 9.8 - 20.1 mg/dL Final   05/14/2025 9.7 (L) 9.8 - 20.1 mg/dL Final     Creatinine   Date Value Ref Range Status   05/15/2025 0.55 0.55 - 1.02 mg/dL Final   05/14/2025 0.57 0.55 - 1.02 mg/dL Final     WBC   Date Value Ref Range Status   05/15/2025 10.13 4.50 - 11.50 x10(3)/mcL Final   05/14/2025 11.22 4.50 - 11.50 x10(3)/mcL Final      Urine Culture   Date Value Ref Range Status   08/30/2024 >/= 100,000 colonies/ml Escherichia coli (A)  Final     No results for input(s): "PH", "PCO2", "PO2", "HCO3", "POCSATURATED", "BE" in the last 72 hours.       "

## 2025-05-16 NOTE — PROGRESS NOTES
Inpatient Nutrition Assessment    Admit Date: 5/9/2025   Total duration of encounter: 7 days   Patient Age: 70 y.o.    Nutrition Recommendation/Prescription     Continue oral diet as tolerated; Diet Adult Regular   Continue Boost Plus; provides 360 kcal, 14 gm protein per container    Communication of Recommendations: reviewed with patient and reviewed with family    Nutrition Assessment     Malnutrition Assessment/Nutrition-Focused Physical Exam       Malnutrition Level: other (see comments) (Does not meet criteria) (05/16/25 8954)     Weight Loss (Malnutrition): other (see comments) (Does not meet criteria) (05/16/25 1338)                                                  A minimum of two characteristics is recommended for diagnosis of either severe or non-severe malnutrition.    Chart Review    Reason Seen: length of stay    Malnutrition Screening Tool Results   Have you recently lost weight without trying?: No  Have you been eating poorly because of a decreased appetite?: No   MST Score: 0   Diagnosis:  Open left femur fracture ; POD3---s/p ORIF (L) distal femur fracture w/ intracondylar split, (L) knee quad tendon repair, excisional debridement open fraction (L) knee   Anemia    Relevant Medical History: DVT, GERD, hypertension, IBS and prior sleeve gastrectomy    Scheduled Medications:  clonazePAM, 4 mg, QHS  docusate sodium, 100 mg, BID  enoxparin, 40 mg, Q12H  methocarbamoL, 500 mg, Q8H  polyethylene glycol, 17 g, BID  tamsulosin, 0.4 mg, Daily    Continuous Infusions:   PRN Medications:  acetaminophen, 650 mg, Q4H PRN  bisacodyL, 10 mg, Daily PRN  dextrose 50%, 12.5 g, PRN  furosemide, 20 mg, Daily PRN  magnesium hydroxide 400 mg/5 ml, 30 mL, Daily PRN  meclizine, 12.5 mg, TID PRN  melatonin, 6 mg, Nightly PRN  oxyCODONE, 5 mg, Q4H PRN    Calorie Containing IV Medications: no significant kcals from medications at this time    Recent Labs   Lab 05/10/25  0905 05/10/25  1259 05/10/25  1259 05/11/25  0585  05/12/25  0359 05/12/25  1637 05/12/25  1925 05/13/25  0404 05/14/25  0418 05/15/25  0404   NA  --  137  --  136 140  --   --  140 138 138   K  --  4.4  --  4.0 3.8  --   --  4.1 4.3 4.0   CALCIUM  --  8.7  --  8.2* 8.0*  --   --  7.7* 7.9* 7.9*   PHOS 3.2  --   --  2.6 2.2*  --   --  2.6 3.2  --    MG 2.00  --   --  1.90 1.90  --   --  2.20 2.10  --    CL  --  105  --  105 107  --   --  107 107 107   CO2  --  22*  --  25 27  --   --  26 24 24   BUN  --  11.9  --  10.6 10.1  --   --  9.9 9.7* 9.0*   CREATININE  --  0.67  --  0.59 0.56  --   --  0.56 0.57 0.55   EGFRNORACEVR  --  >60  --  >60 >60  --   --  >60 >60 >60   GLU  --  164*  --  121* 99  --   --  94 96 100   BILITOT  --  0.5  --  0.3 0.3  --   --  0.6 0.8 0.8   ALKPHOS  --  102  --  79 68  --   --  66 101 96   ALT  --  206*  --  101* 33  --   --  23 63* 73*   AST  --  219*  --  71* 31  --   --  23 75* 76*   ALBUMIN  --  2.6*  --  2.4* 2.2*  --   --  2.1* 2.3* 2.1*   PREALB  --   --   --   --  13.7*  --   --   --   --  15.4   CRP  --   --   --   --  54.60*  --   --   --   --  45.50*   WBC  --  25.23*  --  17.48* 12.46*  --  12.73* 10.69 11.22 10.13   HGB  --  9.0*   < > 7.6* 6.1* 9.1* 9.4* 8.9* 9.6* 8.9*   HCT  --  28.3*   < > 23.9* 19.9* 28.1* 28.6* 27.2* 30.6* 28.6*    < > = values in this interval not displayed.     Nutrition Orders:  Diet Adult Regular  Dietary nutrition supplements BID; Boost Plus Nutritional Drink - Any flavor    Appetite/Oral Intake: good/50-75% of meals  Factors Affecting Nutritional Intake: none identified  Social Needs Impacting Access to Food: none identified  Food/Lutheran/Cultural Preferences: none reported  Food Allergies: no known food allergies  Last Bowel Movement: 05/15/25  Wound(s):      Comments    5/16/25 Pt tolerating oral diet, good appetite and intake reported    Anthropometrics    Height: 5' (152.4 cm), Height Method: Stated  Last Weight: 79.4 kg (175 lb) (05/09/25 1404), Weight Method: Bed Scale  BMI (Calculated):  34.2  BMI Classification: obese grade I (BMI 30-34.9)     Ideal Body Weight (IBW), Female: 100 lb     % Ideal Body Weight, Female (lb): 175 %                             Usual Weight Provided By: patient denies unintentional weight loss    Wt Readings from Last 5 Encounters:   05/09/25 79.4 kg (175 lb)   02/13/25 79.4 kg (175 lb)   12/30/24 79.8 kg (176 lb)   11/25/24 80 kg (176 lb 6.4 oz)   08/30/24 83.9 kg (185 lb)     Weight Change(s) Since Admission:   Wt Readings from Last 1 Encounters:   05/09/25 1404 79.4 kg (175 lb)   Admit Weight: 79.4 kg (175 lb) (05/09/25 1404), Weight Method: Stated    Estimated Needs    Weight Used For Calorie Calculations: 79.4 kg (175 lb 0.7 oz)  Energy Calorie Requirements (kcal): 1588 kcal ( 20 kcal/kg)  Energy Need Method: [place]-St Jeor  Weight Used For Protein Calculations: 79.4 kg (175 lb 0.7 oz)  Protein Requirements: 79-87gm (1-1.1 gm/kg)  Fluid Requirements (mL): 1985 ml (25 ml/kg)        Enteral Nutrition     Patient not receiving enteral nutrition at this time.    Parenteral Nutrition     Patient not receiving parenteral nutrition support at this time.    Evaluation of Received Nutrient Intake    Calories: meeting estimated needs  Protein: meeting estimated needs    Patient Education     Not applicable.    Nutrition Diagnosis     PES: Increased nutrient needs (protein) related to increased protein energy demand for wound healing as evidenced by recent surgery. (new)     PES:            Nutrition Interventions     Intervention(s): commercial beverage  Intervention(s):      Goal: Consume % of meals/snacks by follow-up. (new)      Nutrition Goals & Monitoring     Dietitian will monitor: food and beverage intake  Discharge planning: resume home regimen  Nutrition Risk/Follow-Up: dietitian will follow-up one time per week   Please consult if re-assessment needed sooner.

## 2025-05-16 NOTE — PT/OT/SLP RE-EVAL
Physical Therapy Re-Evaluation    Patient Name:  Varsha Rg   MRN:  [id number]    Recommendations:     Discharge therapy intensity: High Intensity Therapy   Discharge Equipment Recommendations: to be determined by next level of care   Barriers to discharge: Impaired mobility    Assessment:     Varsha Rg is a 70 y.o. female admitted with a medical diagnosis of  L distal femur fx s/p ORIF & quad tendon repair; injuries are result of a fall.  She presents with the following impairments/functional limitations: weakness, impaired endurance, impaired self care skills, impaired functional mobility, gait instability, impaired balance, decreased lower extremity function, decreased safety awareness, pain, orthopedic precautions. The pt tolerated session well. She is agreeable to tf to bedside chair. The pt had therapist foot underneath her foot, and was bearing weight though LE, PT given cues. Pt will greatly benefit from HIGH intensity PT upon dc.      Rehab Prognosis: Good; patient would benefit from acute skilled PT services to address these deficits and reach maximum level of function.    Recent Surgery: Procedure(s) (LRB):  INSERTION, INTRAMEDULLARY STANISLAW, FEMUR, DISTAL, RETROGRADE (Left)  ORIF, FRACTURE, FEMUR, TRANSCONDYLAR WITH INTERCONDYLAR EXTENSION (Left)  REPAIR,MUSCLE,QUADRICEPS OR HAMSTRING (Left)  DEBRIDEMENT, SKIN, FASCIA, MUSCLE, AND BONE, OPEN FRACTURE SITE (Left) 6 Days Post-Op    Plan:     During this hospitalization, patient would benefit from acute PT services 6 x/week to address the identified rehab impairments via gait training, therapeutic activities, therapeutic exercises, neuromuscular re-education and progress toward the following goals:    Plan of Care Expires:  06/16/25    PT/PTA conference to discuss PT POC and patient's progression towards goals held with Ayse Collins PTA.     Subjective     Chief Complaint: none  Patient/Family Comments/goals: return to  [facility]  Pain/Comfort:  Location - Side 1: Left  Location 1: leg  Pain Addressed 1: Reposition, Distraction    Patients cultural, spiritual, Protestant conflicts given the current situation: no    Objective:     Communicated with NSG prior to session.  Patient found supine with dillard catheter  upon PT entry to room.    General Precautions: Standard, fall  Orthopedic Precautions:LLE non weight bearing   Braces: N/A  Respiratory Status: Room air  Blood Pressure: NA      Exams:  RLE ROM: WFL  RLE Strength: WFL  LLE ROM: WFL  LLE Strength: WFL  Skin integrity: Visible skin intact      Functional Mobility:  Bed Mobility:     Scooting: moderate assistance  Supine to Sit: moderate assist  Transfers:     Sit to Stand:  minimum assistance with rolling walker  Bed to Chair: moderate assistance x2 with  rolling walker  using  Stand Pivot, pt unable to take hops or steps but able to pivot foot in direction of chair. Pt with difficulty maintaining WB restrictions, and attempted to sit before she was close enough to chair.    Patient provided with verbal education education regarding PT role/goals/POC, safety awareness, and discharge/DME recommendations.  Understanding was verbalized.     Patient left supine with all lines intact, call button in reach, and NSG notified.    GOALS:   Multidisciplinary Problems       Physical Therapy Goals          Problem: Physical Therapy    Goal Priority Disciplines Outcome Interventions   Physical Therapy Goal     PT, PT/OT Progressing    Description: Pt will be seen for the following goals  1. Pt will perform bed mobility with dannie  2. Pt will perform transfers with a rw with dannie  3. Pt will amb 10ft with rw NWB LLE dannie                       History:     Past Medical History:   Diagnosis Date    Allergy ?    Years ago    Anxiety and depression 01/11/2024    Arthritis     Benign paroxysmal vertigo, bilateral     Chronic back pain     Clotting disorder 1975    BLOOD CLOTS IN LEGS    Concussion      Deep vein thrombosis 1974    9 day hospital stay both legs    Degenerative disc disease ?    DVT (deep venous thrombosis)     Encounter for blood transfusion 2017. And     2 pints for each surgery    Fibromyalgia     GERD (gastroesophageal reflux disease)     History of syncope     Hypercholesteremia     Hypertension     IBS (irritable bowel syndrome)     Lymphedema     Osteoporosis     Personal history of colonic polyps 2015    Skin cancer     SOB (shortness of breath)     Systemic lupus erythematosus, organ or system involvement unspecified     Thyroid disease     Ulcer 1998    Unspecified cataract     Unspecified osteoarthritis, unspecified site        Past Surgical History:   Procedure Laterality Date    ADENOIDECTOMY      Can't remember    APPENDECTOMY      CARPAL TUNNEL RELEASE      CATARACT EXTRACTION Right 2022    CATARACT EXTRACTION Left 2022     SECTION      CHOLECYSTECTOMY      COLONOSCOPY  2015    DEBRIDEMENT, SKIN, FASCIA, MUSCLE, AND BONE, OPEN FRACTURE SITE Left 5/10/2025    Procedure: DEBRIDEMENT, SKIN, FASCIA, MUSCLE, AND BONE, OPEN FRACTURE SITE;  Surgeon: Brent Purcell DO;  Location: [facility];  Service: Orthopedics;  Laterality: Left;    EXCISION OF SQUAMOUS CELL CARCINOMA      EYE SURGERY      FOOT FRACTURE SURGERY      GASTRIC BYPASS      Sleeve    HIATAL HERNIA REPAIR  2022    Dr. gould    HYSTERECTOMY      JOINT REPLACEMENT  2017    Knee    KNEE SURGERY      LAPAROSCOPIC SLEEVE GASTRECTOMY  2022    Dr. Gould    ORIF, FRACTURE, FEMUR, TRANSCONDYLAR WITH INTERCONDYLAR EXTENSION Left 5/10/2025    Procedure: ORIF, FRACTURE, FEMUR, TRANSCONDYLAR WITH INTERCONDYLAR EXTENSION;  Surgeon: Brent Purcell DO;  Location: [facility];  Service: Orthopedics;  Laterality: Left;    RECONSTRUCTION OF SHOULDER      REPAIR, MUSCLE, QUADRICEPS OR HAMSTRING; Left 5/10/2025    Procedure: REPAIR,MUSCLE,QUADRICEPS OR HAMSTRING;  Surgeon: Brent Purcell DO;   Location: [facility] OR;  Service: Orthopedics;  Laterality: Left;    RETROGRADE INTRAMEDULLARY RODDING OF DISTAL FEMUR Left 5/10/2025    Procedure: INSERTION, INTRAMEDULLARY STANISLAW, FEMUR, DISTAL, RETROGRADE;  Surgeon: Brent Purcell DO;  Location: [facility] OR;  Service: Orthopedics;  Laterality: Left;  supine nicho traction triangles, c arm synthes retrograde LAW, wash stuff    TONSILLECTOMY      TRIGGER FINGER RELEASE      WRIST FRACTURE SURGERY         Time Tracking:     PT Received On: 05/16/25  PT Start Time: 1100     PT Stop Time: 1120  PT Total Time (min): 20 min     Billable Minutes: Re-eval 20 05/16/2025

## 2025-05-16 NOTE — PROGRESS NOTES
"Trauma Surgery   Daily Progress Note     HD#7  POD#6 Days Post-Op    Subjective  NAEON  Afebrile, VSS, intermittent mild tachycardia  Pain controlled with medication  Working with therapy, high intensity  Day dc'd yesterday, voiding spontaneously  Tolerating regular diet, passing flatus,last BM 5/15  CM working on rehab SNF placement    Scheduled Meds:   clonazePAM  4 mg Oral QHS    docusate sodium  100 mg Oral BID    enoxparin  40 mg Subcutaneous Q12H    methocarbamoL  500 mg Oral Q8H    mupirocin   Nasal BID    polyethylene glycol  17 g Oral BID    tamsulosin  0.4 mg Oral Daily       Continuous Infusions:    PRN Meds:  Current Facility-Administered Medications:     acetaminophen, 650 mg, Oral, Q4H PRN    bisacodyL, 10 mg, Rectal, Daily PRN    dextrose 50%, 12.5 g, Intravenous, PRN    furosemide, 20 mg, Oral, Daily PRN    magnesium hydroxide 400 mg/5 ml, 30 mL, Oral, Daily PRN    meclizine, 12.5 mg, Oral, TID PRN    melatonin, 6 mg, Oral, Nightly PRN    oxyCODONE, 5 mg, Oral, Q4H PRN     Objective  Temp:  [97.9 °F (36.6 °C)-99.2 °F (37.3 °C)] 99.2 °F (37.3 °C)  Pulse:  [] 102  Resp:  [17] 17  SpO2:  [91 %-97 %] 91 %  BP: (105-138)/(50-71) 108/50     Gen: NAD, AAOx3  HEENT: EOMI, NCAT  CV: RR  Resp: no shortness of breath, normal WOB   Abd: soft, non-distended, NT  Ext:  Orthopedic dressing to left leg CDI     Labs  Recent Labs     05/14/25  0418 05/15/25  0404   WBC 11.22 10.13   HGB 9.6* 8.9*   HCT 30.6* 28.6*    205     Recent Labs     05/14/25  0418 05/15/25  0404    138   K 4.3 4.0    107   CO2 24 24   BUN 9.7* 9.0*   CREATININE 0.57 0.55   GLU 96 100   CALCIUM 7.9* 7.9*   MG 2.10  --    PHOS 3.2  --    PROT 5.4* 5.1*   ALBUMIN 2.3* 2.1*   BILITOT 0.8 0.8   AST 75* 76*   ALKPHOS 101 96   ALT 63* 73*     No results for input(s): "POCTGLUCOSE" in the last 72 hours.     Imaging  X-Ray Femur 2 View Left  Result Date: 5/10/2025  Restoration of near anatomic alignment following internal " fixation of the distal femoral comminuted fracture. Electronically signed by: Bar Martines MD Date:    05/10/2025 Time:    13:28         Assessment/Plan    Anemia  MTH CBC  Received 2u pRBCs 5/12    Open left femur fracture ; POD3---s/p ORIF (L) distal femur fracture w/ intracondylar split, (L) knee quad tendon repair, excisional debridement open fraction (L) knee  Lovenox BID  Multimodal pain control   Nonweightbearing left lower extremity x 10 wks  Continue PT and OT; PT/OT rec high intensity therapy   Anticipate home on aspirin  Home antihypertensives held for now  Perioperative Ancef    Urinary retention  Flomax  Day dc'd 5/15, voiding spontaneously    Fall   Regular diet   Multimodal pain control   Incentive spirometry   Disposition: pending SNF placement       Debby Alvarado  Trauma Surgery

## 2025-05-16 NOTE — PT/OT/SLP PROGRESS
Occupational Therapy   Treatment    Name: Varsha Rg  MRN: [id number]    Recommendations:     Recommended therapy intensity at discharge: High Intensity Therapy   Discharge Equipment Recommendations:  bath bench, walker, rolling  Barriers to discharge:       Assessment:     Varsha Rg is a 70 y.o. female with a medical diagnosis of L distal femur fx s/p ORIF & quad tendon repair; injuries are result of a fall. Performance deficits affecting function are weakness, impaired endurance, impaired self care skills, impaired functional mobility, gait instability, impaired balance, decreased safety awareness, decreased lower extremity function, decreased upper extremity function, orthopedic precautions.     Rehab Prognosis:  Good; patient would benefit from acute skilled OT services to address these deficits and reach maximum level of function.       Plan:     Patient to be seen 6 x/week to address the above listed problems via self-care/home management, therapeutic activities, therapeutic exercises  Plan of Care Expires: 06/12/25  Plan of Care Reviewed with: patient, spouse    Subjective     Pain/Comfort:  Location - Side 1: Left  Location 1: leg  Pain Addressed 1: Reposition, Distraction    Objective:     Communicated with: RN prior to session.  Patient found HOB elevated with dillard catheter upon OT entry to room.    General Precautions: Standard, fall    Orthopedic Precautions:LLE non weight bearing  Braces: N/A  Respiratory Status: Room air     Occupational Performance:     Bed Mobility:    Patient completed Scooting/Bridging with minimum assistance  Patient completed Supine to Sit with minimum assistance   Pt given leg  to assist with managing LLE. Therapist demo'd use and pt verbalized understanding.     Functional Mobility/Transfers:  Patient completed Sit <> Stand Transfer with minimum assistance  with  rolling walker   Patient completed Bed <> Chair Transfer using Stand Pivot technique with moderate  assistance and of 2 persons with rolling walker.  LLE elevated on therapist foot for adherence to pxns. Pt with difficulty maintaining.     Activities of Daily Living:  LE dressing: pt provided with and educated on AE use for LBD. Declined practicing at this time however pt verbalized understanding of use.     Therapeutic Positioning    OT interventions performed during the course of today's session in an effort to prevent and/or reduce acquired pressure injuries:   Education was provided on benefits of and recommendations for therapeutic positioning    [facility] 6 Click ADL: 18    Patient Education:  Patient provided with verbal education education regarding OT role/goals/POC, post op precautions, fall prevention, and safety awareness.  Understanding was verbalized.      Patient left up in chair with all lines intact, call button in reach, and spouse present.    GOALS:   Multidisciplinary Problems       Occupational Therapy Goals          Problem: Occupational Therapy    Goal Priority Disciplines Outcome Interventions   Occupational Therapy Goal     OT, PT/OT Progressing    Description: LTG: Pt will perform basic ADLs and ADL transfers with Modified independence using LRAD by discharge.    STG: to be met by 6/12/25    Pt will complete grooming standing at sink with LRAD with SBA.  Pt will complete UB dressing with SBA.  Pt will complete LB dressing with SBA using LRAD and AE prn.  Pt will complete toileting with SBA using LRAD.  Pt will complete functional mobility to/from toilet and toilet transfer with SBA using LRAD.                        Time Tracking:     OT Date of Treatment: 05/16/25  OT Start Time: 1100  OT Stop Time: 1115  OT Total Time (min): 15 min    Billable Minutes:Self Care/Home Management 15    OT/SHARIFA: SHARIFA     Number of SHARIFA visits since last OT visit: 4    5/16/2025

## 2025-05-17 PROCEDURE — 25000003 PHARM REV CODE 250

## 2025-05-17 PROCEDURE — 11000001 HC ACUTE MED/SURG PRIVATE ROOM

## 2025-05-17 PROCEDURE — 63600175 PHARM REV CODE 636 W HCPCS

## 2025-05-17 PROCEDURE — 25000003 PHARM REV CODE 250: Performed by: NURSE PRACTITIONER

## 2025-05-17 PROCEDURE — 99232 SBSQ HOSP IP/OBS MODERATE 35: CPT | Mod: ,,, | Performed by: SURGERY

## 2025-05-17 PROCEDURE — 94799 UNLISTED PULMONARY SVC/PX: CPT

## 2025-05-17 RX ADMIN — POLYETHYLENE GLYCOL 3350 17 G: 17 POWDER, FOR SOLUTION ORAL at 09:05

## 2025-05-17 RX ADMIN — METHOCARBAMOL 500 MG: 500 TABLET ORAL at 05:05

## 2025-05-17 RX ADMIN — DOCUSATE SODIUM 100 MG: 100 CAPSULE, LIQUID FILLED ORAL at 09:05

## 2025-05-17 RX ADMIN — CLONAZEPAM 4 MG: 1 TABLET ORAL at 09:05

## 2025-05-17 RX ADMIN — ENOXAPARIN SODIUM 40 MG: 40 INJECTION SUBCUTANEOUS at 09:05

## 2025-05-17 RX ADMIN — ACETAMINOPHEN 650 MG: 325 TABLET ORAL at 09:05

## 2025-05-17 RX ADMIN — METHOCARBAMOL 500 MG: 500 TABLET ORAL at 09:05

## 2025-05-17 RX ADMIN — TAMSULOSIN HYDROCHLORIDE 0.4 MG: 0.4 CAPSULE ORAL at 09:05

## 2025-05-17 RX ADMIN — METHOCARBAMOL 500 MG: 500 TABLET ORAL at 02:05

## 2025-05-17 NOTE — PROGRESS NOTES
"Trauma Surgery   Daily Progress Note     HD#8  POD#7 Days Post-Op    Subjective  NAEON  AFVSS  Lab holiday  CM working on rehab SNF placement    Scheduled Meds:   clonazePAM  4 mg Oral QHS    docusate sodium  100 mg Oral BID    enoxparin  40 mg Subcutaneous Q12H    methocarbamoL  500 mg Oral Q8H    polyethylene glycol  17 g Oral BID    tamsulosin  0.4 mg Oral Daily       Continuous Infusions:    PRN Meds:  Current Facility-Administered Medications:     acetaminophen, 650 mg, Oral, Q4H PRN    bisacodyL, 10 mg, Rectal, Daily PRN    dextrose 50%, 12.5 g, Intravenous, PRN    furosemide, 20 mg, Oral, Daily PRN    magnesium hydroxide 400 mg/5 ml, 30 mL, Oral, Daily PRN    meclizine, 12.5 mg, Oral, TID PRN    melatonin, 6 mg, Oral, Nightly PRN    oxyCODONE, 5 mg, Oral, Q4H PRN     Objective  Temp:  [97.5 °F (36.4 °C)-98.6 °F (37 °C)] 98.6 °F (37 °C)  Pulse:  [] 95  Resp:  [17-18] 18  SpO2:  [93 %-96 %] 94 %  BP: (115-146)/() 146/103     Gen: NAD, AAOx3  HEENT: EOMI, NCAT  CV: RR  Resp: no shortness of breath, normal WOB   Abd: soft, non-distended, NT  Ext:  Orthopedic dressing to left leg CDI     Labs  Recent Labs     05/15/25  0404   WBC 10.13   HGB 8.9*   HCT 28.6*        Recent Labs     05/15/25  0404      K 4.0      CO2 24   BUN 9.0*   CREATININE 0.55      CALCIUM 7.9*   PROT 5.1*   ALBUMIN 2.1*   BILITOT 0.8   AST 76*   ALKPHOS 96   ALT 73*     No results for input(s): "POCTGLUCOSE" in the last 72 hours.     Imaging  X-Ray Femur 2 View Left  Result Date: 5/10/2025  Restoration of near anatomic alignment following internal fixation of the distal femoral comminuted fracture. Electronically signed by: Bar Martines MD Date:    05/10/2025 Time:    13:28         Assessment/Plan    Anemia - resolved   MTH CBC  Received 2u pRBCs 5/12    Open left femur fracture ; POD3---s/p ORIF (L) distal femur fracture w/ intracondylar split, (L) knee quad tendon repair, excisional debridement open " fraction (L) knee  Lovenox BID  Multimodal pain control   Nonweightbearing left lower extremity x 10 wks  Continue PT and OT; PT/OT rec high intensity therapy   Anticipate home on aspirin  Home antihypertensives held for now  Perioperative Ancef    Urinary retention  Flomax  Day dc'd 5/15, voiding spontaneously    Fall   Regular diet   Multimodal pain control   Incentive spirometry   Disposition: pending SNF placement       5/17/2025     The above findings, diagnostics, and treatment plan were discussed with Dr. Phillip Stout who will follow with further assessments and recommendations. Please call with any questions, concerns, or clinical status changes.  This note/OR report was created with the assistance of  voice recognition software or phone  dictation.  There may be transcription errors as a result of using this technology however minimal. Effort has been made to assure accuracy of transcription but any obvious errors or omissions should be clarified with the author of the document.

## 2025-05-17 NOTE — PLAN OF CARE
Problem: Adult Inpatient Plan of Care  Goal: Optimal Comfort and Wellbeing  Outcome: Progressing  Intervention: Provide Person-Centered Care  Flowsheets (Taken 5/17/2025 0326)  Trust Relationship/Rapport:   care explained   choices provided   emotional support provided   empathic listening provided   questions answered   questions encouraged   reassurance provided   thoughts/feelings acknowledged     Problem: Infection  Goal: Absence of Infection Signs and Symptoms  Outcome: Progressing  Intervention: Prevent or Manage Infection  Flowsheets (Taken 5/17/2025 0326)  Fever Reduction/Comfort Measures:   lightweight bedding   lightweight clothing     Problem: Wound  Goal: Optimal Pain Control and Function  Outcome: Progressing  Intervention: Prevent or Manage Pain  Flowsheets (Taken 5/17/2025 0326)  Sleep/Rest Enhancement:   awakenings minimized   family presence promoted   regular sleep/rest pattern promoted   room darkened  Pain Management Interventions:   care clustered   medication offered   pain management plan reviewed with patient/caregiver   pillow support provided   position adjusted

## 2025-05-17 NOTE — PLAN OF CARE
Problem: Adult Inpatient Plan of Care  Goal: Plan of Care Review  Outcome: Progressing  Flowsheets (Taken 5/17/2025 1218)  Plan of Care Reviewed With: patient  Goal: Patient-Specific Goal (Individualized)  Outcome: Progressing  Goal: Absence of Hospital-Acquired Illness or Injury  Outcome: Progressing  Intervention: Identify and Manage Fall Risk  Flowsheets (Taken 5/17/2025 1218)  Safety Promotion/Fall Prevention:   assistive device/personal item within reach   Fall Risk signage in place   bed alarm set   family expresses understanding of fall risk and prevention   instructed to call staff for mobility   lighting adjusted   medications reviewed   nonskid shoes/socks when out of bed   Fall Risk reviewed with patient/family   side rails raised x 2  Intervention: Prevent Skin Injury  Flowsheets (Taken 5/17/2025 1218)  Body Position:   position changed independently   weight shifting  Skin Protection: incontinence pads utilized  Device Skin Pressure Protection:   absorbent pad utilized/changed   positioning supports utilized   tubing/devices free from skin contact  Intervention: Prevent and Manage VTE (Venous Thromboembolism) Risk  Flowsheets (Taken 5/17/2025 1218)  VTE Prevention/Management:   ROM (active) performed   ROM (passive) performed   dorsiflexion/plantar flexion performed   bleeding precautions maintained   fluids promoted  Goal: Optimal Comfort and Wellbeing  Outcome: Progressing  Intervention: Monitor Pain and Promote Comfort  Flowsheets (Taken 5/17/2025 1218)  Pain Management Interventions:   care clustered   medication offered   pillow support provided   position adjusted  Goal: Readiness for Transition of Care  Outcome: Progressing     Problem: Diabetes Comorbidity  Goal: Blood Glucose Level Within Targeted Range  Outcome: Progressing     Problem: Skin Injury Risk Increased  Goal: Skin Health and Integrity  Outcome: Progressing     Problem: Infection  Goal: Absence of Infection Signs and  Symptoms  Outcome: Progressing     Problem: Wound  Goal: Optimal Coping  Outcome: Progressing  Goal: Optimal Functional Ability  Outcome: Progressing  Goal: Absence of Infection Signs and Symptoms  Outcome: Progressing  Goal: Improved Oral Intake  Outcome: Progressing  Goal: Optimal Pain Control and Function  Outcome: Progressing  Goal: Skin Health and Integrity  Outcome: Progressing  Goal: Optimal Wound Healing  Outcome: Progressing     Problem: Fall Injury Risk  Goal: Absence of Fall and Fall-Related Injury  Outcome: Progressing

## 2025-05-18 PROCEDURE — 99900035 HC TECH TIME PER 15 MIN (STAT)

## 2025-05-18 PROCEDURE — 97530 THERAPEUTIC ACTIVITIES: CPT | Mod: CQ

## 2025-05-18 PROCEDURE — 11000001 HC ACUTE MED/SURG PRIVATE ROOM

## 2025-05-18 PROCEDURE — 25000003 PHARM REV CODE 250: Performed by: NURSE PRACTITIONER

## 2025-05-18 PROCEDURE — 99900031 HC PATIENT EDUCATION (STAT)

## 2025-05-18 PROCEDURE — 94760 N-INVAS EAR/PLS OXIMETRY 1: CPT

## 2025-05-18 PROCEDURE — 25000003 PHARM REV CODE 250

## 2025-05-18 PROCEDURE — 99231 SBSQ HOSP IP/OBS SF/LOW 25: CPT | Mod: ,,, | Performed by: SURGERY

## 2025-05-18 PROCEDURE — 63600175 PHARM REV CODE 636 W HCPCS

## 2025-05-18 PROCEDURE — 94799 UNLISTED PULMONARY SVC/PX: CPT

## 2025-05-18 PROCEDURE — 97535 SELF CARE MNGMENT TRAINING: CPT | Mod: CO

## 2025-05-18 RX ADMIN — CLONAZEPAM 4 MG: 1 TABLET ORAL at 08:05

## 2025-05-18 RX ADMIN — ENOXAPARIN SODIUM 40 MG: 40 INJECTION SUBCUTANEOUS at 08:05

## 2025-05-18 RX ADMIN — METHOCARBAMOL 500 MG: 500 TABLET ORAL at 06:05

## 2025-05-18 RX ADMIN — METHOCARBAMOL 500 MG: 500 TABLET ORAL at 12:05

## 2025-05-18 RX ADMIN — ENOXAPARIN SODIUM 40 MG: 40 INJECTION SUBCUTANEOUS at 09:05

## 2025-05-18 RX ADMIN — DOCUSATE SODIUM 100 MG: 100 CAPSULE, LIQUID FILLED ORAL at 09:05

## 2025-05-18 RX ADMIN — OXYCODONE HYDROCHLORIDE 5 MG: 5 TABLET ORAL at 01:05

## 2025-05-18 RX ADMIN — ACETAMINOPHEN 650 MG: 325 TABLET ORAL at 06:05

## 2025-05-18 RX ADMIN — ACETAMINOPHEN 650 MG: 325 TABLET ORAL at 12:05

## 2025-05-18 RX ADMIN — DOCUSATE SODIUM 100 MG: 100 CAPSULE, LIQUID FILLED ORAL at 08:05

## 2025-05-18 RX ADMIN — METHOCARBAMOL 500 MG: 500 TABLET ORAL at 08:05

## 2025-05-18 RX ADMIN — POLYETHYLENE GLYCOL 3350 17 G: 17 POWDER, FOR SOLUTION ORAL at 09:05

## 2025-05-18 RX ADMIN — TAMSULOSIN HYDROCHLORIDE 0.4 MG: 0.4 CAPSULE ORAL at 09:05

## 2025-05-18 NOTE — PT/OT/SLP PROGRESS
Physical Therapy Treatment    Patient Name:  Varsha Rg   MRN:  [id number]    Recommendations:     Discharge therapy intensity: High Intensity Therapy   Discharge Equipment Recommendations: to be determined by next level of care  Barriers to discharge: Decreased caregiver support, Impaired mobility, and Ongoing medical needs    Assessment:     Varsha Rg is a 70 y.o. female admitted with a medical diagnosis of Distal femur fracture s/p ORIF left distal femur, left quad tendon repair, and excisional debridement of open fx left knee .  She presents with the following impairments/functional limitations: weakness, impaired endurance, impaired self care skills, impaired functional mobility, gait instability, impaired balance, decreased lower extremity function, decreased safety awareness, pain, orthopedic precautions.    Patient required increased time for time spent on bedside commode.    Rehab Prognosis: Good; patient would benefit from acute skilled PT services to address these deficits and reach maximum level of function.    Recent Surgery: Procedure(s) (LRB):  INSERTION, INTRAMEDULLARY STANISLAW, FEMUR, DISTAL, RETROGRADE (Left)  ORIF, FRACTURE, FEMUR, TRANSCONDYLAR WITH INTERCONDYLAR EXTENSION (Left)  REPAIR,MUSCLE,QUADRICEPS OR HAMSTRING (Left)  DEBRIDEMENT, SKIN, FASCIA, MUSCLE, AND BONE, OPEN FRACTURE SITE (Left) 8 Days Post-Op    Plan:     During this hospitalization, patient would benefit from acute PT services 6 x/week to address the identified rehab impairments via gait training, therapeutic activities, therapeutic exercises, neuromuscular re-education and progress toward the following goals:    Plan of Care Expires:  06/16/25    Subjective     Chief Complaint: pain in left thigh  Patient/Family Comments/goals: none stated  Pain/Comfort:  Pain Rating 1: 0/10      Objective:     Communicated with nurse prior to session.  Patient found up in chair with dillard catheter upon PT entry to room.     General  Precautions: Standard, fall  Orthopedic Precautions: LLE non weight bearing  Braces: N/A  Respiratory Status: Room air  Skin Integrity: Visible skin intact    Functional Mobility:  Bed Mobility:     Scooting: SBA  Supine to Sit: SBA  Transfers:   Sit to Stand:  moderate assistance with rolling walker  Bed to bedside commode to Chair: moderate assistance with  hand-held assist  using  Stand Pivot- patient required constant cues for for safety and to maintain NWB pxns     Education:  Patient provided with verbal education education regarding PT role/goals/POC, post-op precautions, fall prevention, and safety awareness.  Understanding was verbalized.     Patient left up in chair with all lines intact, call button in reach, neto pad in place, and  present    GOALS:   Multidisciplinary Problems       Physical Therapy Goals          Problem: Physical Therapy    Goal Priority Disciplines Outcome Interventions   Physical Therapy Goal     PT, PT/OT Progressing    Description: Pt will be seen for the following goals  1. Pt will perform bed mobility with dannie  2. Pt will perform transfers with a rw with dannie  3. Pt will amb 10ft with rw NWB LLE dannie                       Time Tracking:     PT Received On: 05/18/25  PT Start Time: 0941     PT Stop Time: 1023  PT Total Time (min): 42 min     Billable Minutes: Therapeutic Activity 42    Treatment Type: Treatment  PT/PTA: PTA     Number of PTA visits since last PT visit: 1 05/18/2025

## 2025-05-18 NOTE — PT/OT/SLP PROGRESS
Occupational Therapy   Treatment    Name: Varsha Rg  MRN: [id number]    Recommendations:     Recommended therapy intensity at discharge: High Intensity Therapy   Discharge Equipment Recommendations:  to be determined by next level of care  Barriers to discharge:       Assessment:     Varsha Rg is a 70 y.o. female with a medical diagnosis of L distal femur fx s/p ORIF & quad tendon repair; injuries are result of a fall. Performance deficits affecting function are weakness, impaired endurance, impaired self care skills, impaired functional mobility, gait instability, impaired balance, decreased safety awareness, decreased lower extremity function, decreased upper extremity function, orthopedic precautions. Pt tolerated session well and progressing towards goals. Required extended time on BSC during session.     Rehab Prognosis:  Good; patient would benefit from acute skilled OT services to address these deficits and reach maximum level of function.       Plan:     Patient to be seen 6 x/week to address the above listed problems via self-care/home management, therapeutic activities, therapeutic exercises  Plan of Care Expires: 06/12/25  Plan of Care Reviewed with: patient, spouse    Subjective     Pain/Comfort:  Location - Side 1: Left  Location 1: leg  Pain Addressed 1: Reposition, Distraction    Objective:     Communicated with: RN prior to session.  Patient found HOB elevated with PureWick upon OT entry to room.    General Precautions: Standard, fall    Orthopedic Precautions:LLE non weight bearing  Braces: N/A  Respiratory Status: Room air     Occupational Performance:     Bed Mobility:    Patient completed Scooting/Bridging with stand by assistance  Patient completed Supine to Sit with stand by assistance   Using leg     Functional Mobility/Transfers:  Patient completed Sit <> Stand Transfer with moderate assistance  with  rolling walker   Performed stand pivot t/f from bed<>BSC with Mod A and  RW.   Performed stand pivot t/f from BSC<>chair with Mod A and RW.   Requires assist and verbal cues for adherence to NWB pxns.     Activities of Daily Living:  Toileting: able to void on C    Therapeutic Positioning    OT interventions performed during the course of today's session in an effort to prevent and/or reduce acquired pressure injuries:   Education was provided on benefits of and recommendations for therapeutic positioning    [facility] 6 Click ADL: 18    Patient Education:  Patient provided with verbal education education regarding OT role/goals/POC, post op precautions, fall prevention, and safety awareness.  Understanding was verbalized.      Patient left up in chair with all lines intact, call button in reach, neto pad in place, and  present.    GOALS:   Multidisciplinary Problems       Occupational Therapy Goals          Problem: Occupational Therapy    Goal Priority Disciplines Outcome Interventions   Occupational Therapy Goal     OT, PT/OT Progressing    Description: LTG: Pt will perform basic ADLs and ADL transfers with Modified independence using LRAD by discharge.    STG: to be met by 6/12/25    Pt will complete grooming standing at sink with LRAD with SBA.  Pt will complete UB dressing with SBA.  Pt will complete LB dressing with SBA using LRAD and AE prn.  Pt will complete toileting with SBA using LRAD.  Pt will complete functional mobility to/from toilet and toilet transfer with SBA using LRAD.                        Time Tracking:     OT Date of Treatment: 05/18/25  OT Start Time: 0941  OT Stop Time: 1021  OT Total Time (min): 40 min    Billable Minutes:Self Care/Home Management 40    OT/SHARIFA: SHARIFA     Number of SHARIFA visits since last OT visit: 5    5/18/2025       [phone]

## 2025-05-18 NOTE — PLAN OF CARE
Problem: Adult Inpatient Plan of Care  Goal: Plan of Care Review  Outcome: Progressing  Flowsheets (Taken 5/18/2025 1109)  Plan of Care Reviewed With: patient  Goal: Patient-Specific Goal (Individualized)  Outcome: Progressing  Goal: Absence of Hospital-Acquired Illness or Injury  Outcome: Progressing  Intervention: Identify and Manage Fall Risk  Flowsheets (Taken 5/18/2025 1109)  Safety Promotion/Fall Prevention:   assistive device/personal item within reach   Fall Risk reviewed with patient/family   Fall Risk signage in place   family expresses understanding of fall risk and prevention   instructed to call staff for mobility   lighting adjusted   medications reviewed   nonskid shoes/socks when out of bed   side rails raised x 2  Intervention: Prevent Skin Injury  Flowsheets (Taken 5/18/2025 1109)  Body Position:   position changed independently   sitting up in bed  Skin Protection: incontinence pads utilized  Device Skin Pressure Protection:   absorbent pad utilized/changed   tubing/devices free from skin contact  Intervention: Prevent and Manage VTE (Venous Thromboembolism) Risk  Flowsheets (Taken 5/18/2025 1109)  VTE Prevention/Management:   ROM (active) performed   ROM (passive) performed   dorsiflexion/plantar flexion performed   bleeding precautions maintained  Goal: Optimal Comfort and Wellbeing  Outcome: Progressing  Goal: Readiness for Transition of Care  Outcome: Progressing     Problem: Diabetes Comorbidity  Goal: Blood Glucose Level Within Targeted Range  Outcome: Progressing     Problem: Skin Injury Risk Increased  Goal: Skin Health and Integrity  Outcome: Progressing     Problem: Infection  Goal: Absence of Infection Signs and Symptoms  Outcome: Progressing     Problem: Wound  Goal: Optimal Coping  Outcome: Progressing  Goal: Optimal Functional Ability  Outcome: Progressing  Goal: Absence of Infection Signs and Symptoms  Outcome: Progressing  Goal: Improved Oral Intake  Outcome: Progressing  Goal:  Optimal Pain Control and Function  Outcome: Progressing  Goal: Skin Health and Integrity  Outcome: Progressing  Goal: Optimal Wound Healing  Outcome: Progressing     Problem: Fall Injury Risk  Goal: Absence of Fall and Fall-Related Injury  Outcome: Progressing

## 2025-05-18 NOTE — PROGRESS NOTES
"Trauma Surgery   Daily Progress Note     HD#9  POD#8 Days Post-Op    Subjective  NAEON  AFVSS  Pain controlled with medication  Tired, states she did not sleep well last night, difficulty finding a comfortable position  Working with therapy, high intensity  Tolerating regular diet, passing flatus,last BM 5/17  CM working on rehab SNF placement    Scheduled Meds:   clonazePAM  4 mg Oral QHS    docusate sodium  100 mg Oral BID    enoxparin  40 mg Subcutaneous Q12H    methocarbamoL  500 mg Oral Q8H    polyethylene glycol  17 g Oral BID    tamsulosin  0.4 mg Oral Daily       Continuous Infusions:    PRN Meds:  Current Facility-Administered Medications:     acetaminophen, 650 mg, Oral, Q4H PRN    bisacodyL, 10 mg, Rectal, Daily PRN    dextrose 50%, 12.5 g, Intravenous, PRN    furosemide, 20 mg, Oral, Daily PRN    magnesium hydroxide 400 mg/5 ml, 30 mL, Oral, Daily PRN    meclizine, 12.5 mg, Oral, TID PRN    melatonin, 6 mg, Oral, Nightly PRN    oxyCODONE, 5 mg, Oral, Q4H PRN     Objective  Temp:  [97.6 °F (36.4 °C)-98.6 °F (37 °C)] 97.6 °F (36.4 °C)  Pulse:  [] 91  Resp:  [16-18] 18  SpO2:  [93 %-96 %] 93 %  BP: (125-145)/(47-79) 125/69     Gen: NAD, AAOx3  HEENT: EOMI, NCAT  CV: RR  Resp: no shortness of breath, normal WOB   Abd: soft, non-distended, NT  Ext:  Orthopedic dressing to left leg CDI     Labs  No results for input(s): "WBC", "HGB", "HCT", "PLT", "PTT", "INR" in the last 72 hours.    No results for input(s): "NA", "K", "CL", "CO2", "BUN", "CREATININE", "GLU", "CALCIUM", "MG", "PHOS", "PROT", "ALBUMIN", "BILITOT", "AST", "ALKPHOS", "ALT", "LACTIC" in the last 72 hours.    No results for input(s): "POCTGLUCOSE" in the last 72 hours.     Imaging  X-Ray Femur 2 View Left  Result Date: 5/10/2025  Restoration of near anatomic alignment following internal fixation of the distal femoral comminuted fracture. Electronically signed by: Bar Martines MD Date:    05/10/2025 Time:    13:28       "   Assessment/Plan    Anemia  MTH CBC  Received 2u pRBCs 5/12    Open left femur fracture s/p ORIF (L) distal femur fracture w/ intracondylar split, (L) knee quad tendon repair, excisional debridement open fraction (L) knee  Lovenox BID  Multimodal pain control   Nonweightbearing left lower extremity x 10 wks  Continue PT and OT; PT/OT rec high intensity therapy   Anticipate home on aspirin  Home antihypertensives held for now  Perioperative Ancef    Urinary retention  Flomax  Day dc'd 5/15, voiding spontaneously    Fall   Regular diet   Multimodal pain control   Incentive spirometry   Disposition: pending SNF placement       Debby SANCHEZ [place]  Trauma Surgery

## 2025-05-19 VITALS
SYSTOLIC BLOOD PRESSURE: 114 MMHG | BODY MASS INDEX: 34.36 KG/M2 | HEIGHT: 60 IN | WEIGHT: 175 LBS | HEART RATE: 97 BPM | RESPIRATION RATE: 18 BRPM | TEMPERATURE: 98 F | OXYGEN SATURATION: 96 % | DIASTOLIC BLOOD PRESSURE: 72 MMHG

## 2025-05-19 LAB
ALBUMIN SERPL-MCNC: 2.2 G/DL (ref 3.4–4.8)
ALBUMIN/GLOB SERPL: 0.5 RATIO (ref 1.1–2)
ALP SERPL-CCNC: 105 UNIT/L (ref 40–150)
ALT SERPL-CCNC: 44 UNIT/L (ref 0–55)
ANION GAP SERPL CALC-SCNC: 11 MEQ/L
AST SERPL-CCNC: 21 UNIT/L (ref 11–45)
BACTERIA #/AREA URNS AUTO: ABNORMAL /HPF
BASOPHILS # BLD AUTO: 0.06 X10(3)/MCL
BASOPHILS NFR BLD AUTO: 0.6 %
BILIRUB SERPL-MCNC: 0.6 MG/DL
BILIRUB UR QL STRIP.AUTO: NEGATIVE
BUN SERPL-MCNC: 11.5 MG/DL (ref 9.8–20.1)
CALCIUM SERPL-MCNC: 8.9 MG/DL (ref 8.4–10.2)
CHLORIDE SERPL-SCNC: 105 MMOL/L (ref 98–107)
CLARITY UR: ABNORMAL
CO2 SERPL-SCNC: 24 MMOL/L (ref 23–31)
COLOR UR AUTO: YELLOW
CREAT SERPL-MCNC: 0.59 MG/DL (ref 0.55–1.02)
CREAT/UREA NIT SERPL: 19
CRP SERPL-MCNC: 54.1 MG/L
EOSINOPHIL # BLD AUTO: 0.21 X10(3)/MCL (ref 0–0.9)
EOSINOPHIL NFR BLD AUTO: 2 %
ERYTHROCYTE [DISTWIDTH] IN BLOOD BY AUTOMATED COUNT: 14.5 % (ref 11.5–17)
GFR SERPLBLD CREATININE-BSD FMLA CKD-EPI: >60 ML/MIN/1.73/M2
GLOBULIN SER-MCNC: 4.2 GM/DL (ref 2.4–3.5)
GLUCOSE SERPL-MCNC: 97 MG/DL (ref 82–115)
GLUCOSE UR QL STRIP: NORMAL
HCT VFR BLD AUTO: 28.8 % (ref 37–47)
HGB BLD-MCNC: 9 G/DL (ref 12–16)
HGB UR QL STRIP: ABNORMAL
IMM GRANULOCYTES # BLD AUTO: 0.06 X10(3)/MCL (ref 0–0.04)
IMM GRANULOCYTES NFR BLD AUTO: 0.6 %
KETONES UR QL STRIP: NEGATIVE
LEUKOCYTE ESTERASE UR QL STRIP: 500
LYMPHOCYTES # BLD AUTO: 2.97 X10(3)/MCL (ref 0.6–4.6)
LYMPHOCYTES NFR BLD AUTO: 28 %
MCH RBC QN AUTO: 30.5 PG (ref 27–31)
MCHC RBC AUTO-ENTMCNC: 31.3 G/DL (ref 33–36)
MCV RBC AUTO: 97.6 FL (ref 80–94)
MONOCYTES # BLD AUTO: 0.95 X10(3)/MCL (ref 0.1–1.3)
MONOCYTES NFR BLD AUTO: 9 %
NEUTROPHILS # BLD AUTO: 6.36 X10(3)/MCL (ref 2.1–9.2)
NEUTROPHILS NFR BLD AUTO: 59.8 %
NITRITE UR QL STRIP: NEGATIVE
NRBC BLD AUTO-RTO: 0 %
PH UR STRIP: 6 [PH]
PLATELET # BLD AUTO: 335 X10(3)/MCL (ref 130–400)
PMV BLD AUTO: 9.6 FL (ref 7.4–10.4)
POTASSIUM SERPL-SCNC: 4.1 MMOL/L (ref 3.5–5.1)
PREALB SERPL-MCNC: 14.5 MG/DL (ref 14–37)
PROT SERPL-MCNC: 6.4 GM/DL (ref 5.8–7.6)
PROT UR QL STRIP: NEGATIVE
RBC # BLD AUTO: 2.95 X10(6)/MCL (ref 4.2–5.4)
RBC #/AREA URNS AUTO: ABNORMAL /HPF
SODIUM SERPL-SCNC: 140 MMOL/L (ref 136–145)
SP GR UR STRIP.AUTO: 1.01 (ref 1–1.03)
SQUAMOUS #/AREA URNS LPF: ABNORMAL /HPF
UROBILINOGEN UR STRIP-ACNC: NORMAL
WBC # BLD AUTO: 10.61 X10(3)/MCL (ref 4.5–11.5)
WBC #/AREA URNS AUTO: >100 /HPF

## 2025-05-19 PROCEDURE — 85025 COMPLETE CBC W/AUTO DIFF WBC: CPT | Performed by: NURSE PRACTITIONER

## 2025-05-19 PROCEDURE — 63600175 PHARM REV CODE 636 W HCPCS

## 2025-05-19 PROCEDURE — 25000003 PHARM REV CODE 250

## 2025-05-19 PROCEDURE — 80053 COMPREHEN METABOLIC PANEL: CPT | Performed by: NURSE PRACTITIONER

## 2025-05-19 PROCEDURE — 87077 CULTURE AEROBIC IDENTIFY: CPT | Performed by: NURSE PRACTITIONER

## 2025-05-19 PROCEDURE — 36415 COLL VENOUS BLD VENIPUNCTURE: CPT | Performed by: NURSE PRACTITIONER

## 2025-05-19 PROCEDURE — 81001 URINALYSIS AUTO W/SCOPE: CPT | Performed by: NURSE PRACTITIONER

## 2025-05-19 PROCEDURE — 84134 ASSAY OF PREALBUMIN: CPT | Performed by: NURSE PRACTITIONER

## 2025-05-19 PROCEDURE — 97168 OT RE-EVAL EST PLAN CARE: CPT

## 2025-05-19 PROCEDURE — 97530 THERAPEUTIC ACTIVITIES: CPT | Mod: CQ

## 2025-05-19 PROCEDURE — 86140 C-REACTIVE PROTEIN: CPT | Performed by: NURSE PRACTITIONER

## 2025-05-19 PROCEDURE — 25000003 PHARM REV CODE 250: Performed by: NURSE PRACTITIONER

## 2025-05-19 RX ORDER — METHOCARBAMOL 500 MG/1
500 TABLET, FILM COATED ORAL EVERY 8 HOURS
Status: ON HOLD
Start: 2025-05-19 | End: 2025-05-29

## 2025-05-19 RX ORDER — PHENAZOPYRIDINE HYDROCHLORIDE 100 MG/1
100 TABLET, FILM COATED ORAL 3 TIMES DAILY PRN
Status: ON HOLD
Start: 2025-05-19 | End: 2025-05-29

## 2025-05-19 RX ORDER — PHENAZOPYRIDINE HYDROCHLORIDE 100 MG/1
100 TABLET, FILM COATED ORAL 3 TIMES DAILY PRN
Status: DISCONTINUED | OUTPATIENT
Start: 2025-05-19 | End: 2025-05-19 | Stop reason: HOSPADM

## 2025-05-19 RX ORDER — ENOXAPARIN SODIUM 100 MG/ML
40 INJECTION SUBCUTANEOUS DAILY
Status: ON HOLD
Start: 2025-05-19 | End: 2025-06-18

## 2025-05-19 RX ORDER — PHENAZOPYRIDINE HYDROCHLORIDE 100 MG/1
100 TABLET, FILM COATED ORAL
Status: DISCONTINUED | OUTPATIENT
Start: 2025-05-19 | End: 2025-05-19

## 2025-05-19 RX ADMIN — OXYCODONE HYDROCHLORIDE 5 MG: 5 TABLET ORAL at 07:05

## 2025-05-19 RX ADMIN — ENOXAPARIN SODIUM 40 MG: 40 INJECTION SUBCUTANEOUS at 09:05

## 2025-05-19 RX ADMIN — POLYETHYLENE GLYCOL 3350 17 G: 17 POWDER, FOR SOLUTION ORAL at 09:05

## 2025-05-19 RX ADMIN — DOCUSATE SODIUM 100 MG: 100 CAPSULE, LIQUID FILLED ORAL at 09:05

## 2025-05-19 RX ADMIN — METHOCARBAMOL 500 MG: 500 TABLET ORAL at 05:05

## 2025-05-19 RX ADMIN — TAMSULOSIN HYDROCHLORIDE 0.4 MG: 0.4 CAPSULE ORAL at 09:05

## 2025-05-19 RX ADMIN — ACETAMINOPHEN 650 MG: 325 TABLET ORAL at 09:05

## 2025-05-19 RX ADMIN — ACETAMINOPHEN 650 MG: 325 TABLET ORAL at 02:05

## 2025-05-19 NOTE — PT/OT/SLP RE-EVAL
Occupational Therapy   Treatment    Name: Varsha Rg  MRN: [id number]    Recommendations:     Recommended therapy intensity at discharge: High Intensity Therapy   Discharge Equipment Recommendations:  to be determined by next level of care  Barriers to discharge:  Other (Comment) (ongoing medical needs)    Assessment:     Varsha Rg is a 70 y.o. female with a medical diagnosis of distal femur fracture s/p ORIF left distal femur, left quad tendon repair, and excisional debridement of open fx left knee. She tolerated OT re-evaluation well. Pt has demonstrated progress with functional t/fs; now requiring min-mod A. Performance deficits affecting function are weakness, impaired endurance, impaired self care skills, impaired functional mobility, gait instability, impaired balance, orthopedic precautions, decreased lower extremity function, decreased safety awareness. Continue to recommend high intensity therapy upon d/c.     Rehab Prognosis:  Good; patient would benefit from acute skilled OT services to address these deficits and reach maximum level of function.       Plan:     Patient to be seen 6 x/week to address the above listed problems via self-care/home management, therapeutic activities, therapeutic exercises  Plan of Care Expires: 06/19/25  Plan of Care Reviewed with: patient, spouse    Subjective     Pain/Comfort:  Location - Side 1: Left  Location 1: leg  Pain Addressed 1: Reposition, Distraction    Objective:     Communicated with: RN prior to session.  Patient found HOB elevated with PureWick, peripheral IV upon OT entry to room.    General Precautions: Standard, fall    Orthopedic Precautions:LLE non weight bearing  Braces: N/A  Respiratory Status: Room air     Occupational Performance:     Bed Mobility:    Patient completed Scooting/Bridging with contact guard assistance  Patient completed Supine to Sit with minimum assistance using leg   Patient completed Sit to Supine with moderate  assistance and x2 persons     Functional Mobility/Transfers:  Patient completed x4 Sit <> Stand Transfer with minimum assistance  with  rolling walker   Functional Mobility: pt declined bed<chair t/f this date due to having just gotten back to bed with nsg.     Activities of Daily Living:  Lower Body Dressing: maximal assistance to don socks.     Therapeutic Positioning    OT interventions performed during the course of today's session in an effort to prevent and/or reduce acquired pressure injuries:   Positioning recommendations were communicated to care team     Skin assessment: all bony prominences were assessed    Findings: Visible skin intact.     [facility] 6 Click ADL: 18    Patient Education:  Patient and spouse were provided with verbal education education regarding OT role/goals/POC, post op precautions, fall prevention, safety awareness, Discharge/DME recommendations, and pressure ulcer prevention.  Understanding was verbalized.      Patient left HOB elevated with all lines intact, call button in reach, and RN notified.    GOALS:   Multidisciplinary Problems       Occupational Therapy Goals          Problem: Occupational Therapy    Goal Priority Disciplines Outcome Interventions   Occupational Therapy Goal     OT, PT/OT Progressing    Description: LTG: Pt will perform basic ADLs and ADL transfers with Modified independence using LRAD by discharge.    STG: to be met by 6/19/25    Pt will complete grooming standing at sink with LRAD with SBA.  Pt will complete UB dressing with SBA.  Pt will complete LB dressing with SBA using LRAD and AE prn.  Pt will complete toileting with SBA using LRAD.  Pt will complete functional mobility to/from toilet and toilet transfer with SBA using LRAD.                        Time Tracking:     OT Date of Treatment: 05/19/25  OT Start Time: 1040  OT Stop Time: 1103  OT Total Time (min): 23 min    Billable Minutes:Re-eval 23 minutes.     OT/SHARIFA: OT     Number of SHARIFA visits since last  OT visit: 0    5/19/2025

## 2025-05-19 NOTE — CARE UPDATE
UA reviewed.  Based on previous culture Cipro is appropriate.  Nursing is aware to notify the skilled nursing facility.  It has been placed in the discharge med rec.

## 2025-05-19 NOTE — NURSING
Report given GOPI Montez at TCU. Number given if any further questions, ETA [facility] 2 hour wait time as of now. Nurse requesting to transfer with IVs

## 2025-05-19 NOTE — HOSPITAL COURSE
70-year-old female trip and fall and suffered an open distal femur fracture that underwent open reduction internal fixation by Orthopedics.  She has done well with therapy.  She will be discharged to the transitional care unit at [facility].  She will be nonweightbearing to the left lower extremity and we will be discharged on 30 days of Lovenox at discharge.  She will be continued on aspirin b.i.d. after.  She will follow up with Orthopedics.  She did have a UTI during her stay.  She was having some bladder spasms again today and a UA we will be collected before discharge.  If she has a positive UA we will send her on oral antibiotics based on her last culture.

## 2025-05-19 NOTE — PLAN OF CARE
Bipin Matamoros with TCU admissions requesting update on auth status. Awaiting response.     Brielle Brooks, RANDAW    1000 Pt still pending auth at this time.    1020 Pt received auth for TCU- notified trauma. Still pending UA at this time. Will arrange for transport once UA has been complete and results are available. SW updated pt and spouse at bedside.

## 2025-05-19 NOTE — PLAN OF CARE
05/19/25 1535   Final Note   Assessment Type Final Discharge Note   Anticipated Discharge Disposition SNF   Hospital Resources/Appts/Education Provided Post-Acute resouces added to AVS   Post-Acute Status   Post-Acute Authorization Placement   Post-Acute Placement Status Set-up Complete/Auth obtained   Discharge Delays None known at this time     Pt discharging to TCU. Transportation arranged with [facility] using [facility], as [facility] had no more availability. 2 hour delay in area. Pt and spouse aware of d/c today. Packet given to nurse. No further CM needs known at this time.     Brielle Brooks LCSW

## 2025-05-19 NOTE — PT/OT/SLP PROGRESS
Physical Therapy Treatment    Patient Name:  Varsha Rg   MRN:  [id number]    Recommendations:     Discharge therapy intensity: High Intensity Therapy   Discharge Equipment Recommendations: to be determined by next level of care  Barriers to discharge: Impaired mobility    Assessment:     Varsha Rg is a 70 y.o. female admitted with a medical diagnosis of Distal femur fracture s/p ORIF left distal femur, left quad tendon repair, and excisional debridement of open fx left knee . .  She presents with the following impairments/functional limitations: weakness, impaired endurance, impaired self care skills, impaired functional mobility, gait instability, impaired balance, decreased lower extremity function, decreased safety awareness, pain, orthopedic precautions .      Rehab Prognosis: Good; patient would benefit from acute skilled PT services to address these deficits and reach maximum level of function.    Recent Surgery: Procedure(s) (LRB):  INSERTION, INTRAMEDULLARY STANISLAW, FEMUR, DISTAL, RETROGRADE (Left)  ORIF, FRACTURE, FEMUR, TRANSCONDYLAR WITH INTERCONDYLAR EXTENSION (Left)  REPAIR,MUSCLE,QUADRICEPS OR HAMSTRING (Left)  DEBRIDEMENT, SKIN, FASCIA, MUSCLE, AND BONE, OPEN FRACTURE SITE (Left) 9 Days Post-Op    Plan:     During this hospitalization, patient would benefit from acute PT services 6 x/week to address the identified rehab impairments via gait training, therapeutic activities, therapeutic exercises, neuromuscular re-education and progress toward the following goals:    Plan of Care Expires:  06/16/25    Subjective     Chief Complaint:   Patient/Family Comments/goals:   Pain/Comfort:  Location - Side 1: Left  Location 1: leg  Pain Addressed 1: Reposition, Distraction      Objective:     Communicated with NSG prior to session.  Patient found HOB elevated with PureWick upon PT entry to room.     General Precautions: Standard, fall  Orthopedic Precautions: LLE non weight bearing  Braces:  N/A  Respiratory Status: Room air  Blood Pressure:   Skin Integrity: Visible skin intact      Functional Mobility:  Bed Mobility:     Scooting: contact guard assistance and required increased time   Supine to Sit: minimum assistance and required increased time   Sit to Supine: moderate assistance and of 2 persons  Transfers:     Sit to Stand:  minimum assistance with rolling walker and 4 trials from EOB. Required increased time and rest between trials. Vcs given to maintain NWB on LLE as well as for proper hand placement.   Dyn sitting: pt able to reach BUE outside her CHELY multiple trials. Sitting balance remained SBA throughout .       Patient left HOB elevated with all lines intact and call button in reach    GOALS:   Multidisciplinary Problems       Physical Therapy Goals          Problem: Physical Therapy    Goal Priority Disciplines Outcome Interventions   Physical Therapy Goal     PT, PT/OT Progressing    Description: Pt will be seen for the following goals  1. Pt will perform bed mobility with dannie  2. Pt will perform transfers with a rw with dannie  3. Pt will amb 10ft with rw NWB LLE dannie                       Time Tracking:     PT Received On: 05/19/25  PT Start Time: 1040     PT Stop Time: 1103  PT Total Time (min): 23 min     Billable Minutes: Therapeutic Activity 23    Treatment Type: Treatment  PT/PTA: PTA     Number of PTA visits since last PT visit: 2     05/19/2025

## 2025-05-19 NOTE — PROGRESS NOTES
"Trauma Surgery   Daily Progress Note     HD#10  POD#9 Days Post-Op    Subjective  NAEON  AFVSS  Pain controlled with medication  Having bladder spasms again, UA added  Labs noted  CM working on rehab SNF placement    Scheduled Meds:   clonazePAM  4 mg Oral QHS    docusate sodium  100 mg Oral BID    enoxparin  40 mg Subcutaneous Q12H    methocarbamoL  500 mg Oral Q8H    polyethylene glycol  17 g Oral BID    tamsulosin  0.4 mg Oral Daily       Continuous Infusions:    PRN Meds:  Current Facility-Administered Medications:     acetaminophen, 650 mg, Oral, Q4H PRN    bisacodyL, 10 mg, Rectal, Daily PRN    dextrose 50%, 12.5 g, Intravenous, PRN    furosemide, 20 mg, Oral, Daily PRN    magnesium hydroxide 400 mg/5 ml, 30 mL, Oral, Daily PRN    meclizine, 12.5 mg, Oral, TID PRN    melatonin, 6 mg, Oral, Nightly PRN    oxyCODONE, 5 mg, Oral, Q4H PRN    phenazopyridine, 100 mg, Oral, TID PRN     Objective  Temp:  [97.6 °F (36.4 °C)-99.2 °F (37.3 °C)] 98.1 °F (36.7 °C)  Pulse:  [91-96] 92  Resp:  [16] 16  SpO2:  [93 %-97 %] 94 %  BP: (107-126)/(53-69) 126/53     Gen: NAD, AAOx3  HEENT: EOMI, NCAT  CV: RR  Resp: no shortness of breath, normal WOB   Abd: soft, non-distended, NT  Ext:  Orthopedic dressing to left leg CDI     Labs  Recent Labs     05/19/25  0530   WBC 10.61   HGB 9.0*   HCT 28.8*          Recent Labs     05/19/25  0530      K 4.1      CO2 24   BUN 11.5   CREATININE 0.59   GLU 97   CALCIUM 8.9   PROT 6.4   ALBUMIN 2.2*   BILITOT 0.6   AST 21   ALKPHOS 105   ALT 44       No results for input(s): "POCTGLUCOSE" in the last 72 hours.     Imaging  X-Ray Femur 2 View Left  Result Date: 5/10/2025  Restoration of near anatomic alignment following internal fixation of the distal femoral comminuted fracture. Electronically signed by: Bar Martines MD Date:    05/10/2025 Time:    13:28         Assessment/Plan    Anemia  MTH CBC  Received 2u pRBCs 5/12    Open left femur fracture s/p ORIF (L) distal femur " fracture w/ intracondylar split, (L) knee quad tendon repair, excisional debridement open fraction (L) knee  Lovenox BID  Multimodal pain control   Nonweightbearing left lower extremity x 10 wks  Continue PT and OT; PT/OT rec high intensity therapy   Anticipate home on aspirin  Home antihypertensives held for now  Perioperative Ancef    Urinary retention  Flomax  Day dc'd 5/15, voiding spontaneously  Repeat UA for bladder spasms, did receive Rocephin x2 doses for previous UTI    Fall   Regular diet   Multimodal pain control   Incentive spirometry   Disposition: pending SNF placement       Tico Samson  Trauma Surgery

## 2025-05-20 ENCOUNTER — TELEPHONE (OUTPATIENT)
Dept: INTERNAL MEDICINE | Facility: CLINIC | Age: 70
End: 2025-05-20
Payer: MEDICARE

## 2025-05-20 NOTE — TELEPHONE ENCOUNTER
Case management in the hospital usually takes care of these orders in the hospital. Hospital nurses need to discuss with case management and MD who is attending.

## 2025-05-20 NOTE — TELEPHONE ENCOUNTER
Pt called stating she is still in the hospital and will be needing a bedside commode, wheelchair and walker. States she spoke with the nurses and was told they need orders from MD. She is also asking for MD to call her. Please advise

## 2025-05-20 NOTE — TELEPHONE ENCOUNTER
Copied from CRM #[id number]. Topic: General Inquiry - Patient Advice  >> May 20, 2025 11:55 AM Yogesh wrote:  .Who Called: Varsha Rg    Caller is requesting assistance/information from provider's office.    Symptoms (please be specific): N/A   How long has patient had these symptoms:  N/A  List of preferred pharmacies on file (remove unneeded): [unfilled]  If different, enter pharmacy into here including location and phone number:  N/A    Preferred Method of Contact: Phone Call    Patient's Preferred Phone Number on File: [phone]     Best Call Back Number, if different:    Additional Information: Pt called requesting a cb from Dr. Vogt or Yenni. Offered to assist pt, she stated she needs to speak with one of them regarding herself and her health. Please advise, thank you.

## 2025-05-21 ENCOUNTER — DOCUMENTATION ONLY (OUTPATIENT)
Dept: SURGERY | Facility: HOSPITAL | Age: 70
End: 2025-05-21
Payer: MEDICARE

## 2025-05-21 LAB — BACTERIA UR CULT: ABNORMAL

## 2025-05-21 NOTE — PROGRESS NOTES
Culture results reviewed.  Had discharge patient on Cipro.  Culture sensitive to Cipro.  We reviewed the attending physician note from today.  The patient has known he has reviewed the culture report and adjust antibiotics to Macrobid which GIAs sensitive in the appropriate.  We appreciate his assistance.

## 2025-05-26 DIAGNOSIS — F41.1 GENERALIZED ANXIETY DISORDER: ICD-10-CM

## 2025-05-26 RX ORDER — CLONAZEPAM 2 MG/1
4 TABLET ORAL NIGHTLY
Qty: 60 TABLET | Refills: 3 | Status: SHIPPED | OUTPATIENT
Start: 2025-05-26

## 2025-05-29 ENCOUNTER — TELEPHONE (OUTPATIENT)
Dept: SURGERY | Facility: CLINIC | Age: 70
End: 2025-05-29
Payer: MEDICARE

## 2025-05-29 NOTE — TELEPHONE ENCOUNTER
----- Message from Med Assistant Narayanan sent at 5/29/2025  4:23 PM CDT -----  Regarding: voicemail  Pt left a vm concerning things the dietician wants her to do while in the hospitalCall back #: [phone]

## 2025-05-30 ENCOUNTER — TELEPHONE (OUTPATIENT)
Dept: BARIATRICS | Facility: HOSPITAL | Age: 70
End: 2025-05-30
Payer: MEDICARE

## 2025-05-30 DIAGNOSIS — Z98.84 HX OF BARIATRIC SURGERY: Primary | ICD-10-CM

## 2025-05-30 NOTE — TELEPHONE ENCOUNTER
Pt wanted to make sure Boost High Calorie shakes were sufficient to drink at this time since she had bariatric surgery (hx 3 years ago) and the shakes are higher in added sugar. Explained that while in hospital recovering from multiple bone fracture she would need the additional calories for recovery- they are sufficient in protein content as well. Advised to switch back to her normal protein shakes when she returns home next week.     Pt also said she was low in iron- discussed proper vitamin regimen with her. Advised her to separate calcium from MVI (contains iron) at least by 2 hours so her iron absorption was not blocked. Pt understood.

## 2025-06-03 ENCOUNTER — OFFICE VISIT (OUTPATIENT)
Dept: ORTHOPEDICS | Facility: CLINIC | Age: 70
End: 2025-06-03
Payer: MEDICARE

## 2025-06-03 ENCOUNTER — HOSPITAL ENCOUNTER (OUTPATIENT)
Dept: RADIOLOGY | Facility: CLINIC | Age: 70
Discharge: HOME | End: 2025-06-03
Payer: MEDICARE

## 2025-06-03 VITALS
DIASTOLIC BLOOD PRESSURE: 86 MMHG | SYSTOLIC BLOOD PRESSURE: 128 MMHG | HEART RATE: 70 BPM | HEIGHT: 60 IN | BODY MASS INDEX: 33.84 KG/M2 | WEIGHT: 172.38 LBS

## 2025-06-03 DIAGNOSIS — S72.402E TYPE I OR II OPEN FRACTURE OF DISTAL END OF LEFT FEMUR WITH ROUTINE HEALING, UNSPECIFIED FRACTURE MORPHOLOGY, SUBSEQUENT ENCOUNTER: Primary | ICD-10-CM

## 2025-06-03 DIAGNOSIS — S72.402E TYPE I OR II OPEN FRACTURE OF DISTAL END OF LEFT FEMUR WITH ROUTINE HEALING, UNSPECIFIED FRACTURE MORPHOLOGY, SUBSEQUENT ENCOUNTER: ICD-10-CM

## 2025-06-03 PROCEDURE — 1159F MED LIST DOCD IN RCRD: CPT | Mod: CPTII,,, | Performed by: PHYSICIAN ASSISTANT

## 2025-06-03 PROCEDURE — 1101F PT FALLS ASSESS-DOCD LE1/YR: CPT | Mod: CPTII,,, | Performed by: PHYSICIAN ASSISTANT

## 2025-06-03 PROCEDURE — 3079F DIAST BP 80-89 MM HG: CPT | Mod: CPTII,,, | Performed by: PHYSICIAN ASSISTANT

## 2025-06-03 PROCEDURE — 3074F SYST BP LT 130 MM HG: CPT | Mod: CPTII,,, | Performed by: PHYSICIAN ASSISTANT

## 2025-06-03 PROCEDURE — 99024 POSTOP FOLLOW-UP VISIT: CPT | Mod: ,,, | Performed by: PHYSICIAN ASSISTANT

## 2025-06-03 PROCEDURE — 4010F ACE/ARB THERAPY RXD/TAKEN: CPT | Mod: CPTII,,, | Performed by: PHYSICIAN ASSISTANT

## 2025-06-03 PROCEDURE — 73552 X-RAY EXAM OF FEMUR 2/>: CPT | Mod: LT,,, | Performed by: ORTHOPAEDIC SURGERY

## 2025-06-03 PROCEDURE — 3288F FALL RISK ASSESSMENT DOCD: CPT | Mod: CPTII,,, | Performed by: PHYSICIAN ASSISTANT

## 2025-06-03 PROCEDURE — 3044F HG A1C LEVEL LT 7.0%: CPT | Mod: CPTII,,, | Performed by: PHYSICIAN ASSISTANT

## 2025-06-04 ENCOUNTER — TELEPHONE (OUTPATIENT)
Dept: INTERNAL MEDICINE | Facility: CLINIC | Age: 70
End: 2025-06-04
Payer: MEDICARE

## 2025-06-09 ENCOUNTER — PATIENT OUTREACH (OUTPATIENT)
Facility: CLINIC | Age: 70
End: 2025-06-09
Payer: MEDICARE

## 2025-06-09 NOTE — PROGRESS NOTES
Health Maintenance Topic(s) Outreach Outcomes & Actions Taken:    Eye Exam - Outreach Outcomes & Actions Taken  : I made attempt to contact patient. No answer. Left message.      Additional Notes:

## 2025-06-13 ENCOUNTER — TELEPHONE (OUTPATIENT)
Dept: INTERNAL MEDICINE | Facility: CLINIC | Age: 70
End: 2025-06-13
Payer: MEDICARE

## 2025-06-13 DIAGNOSIS — N32.89 BLADDER SPASMS: Primary | ICD-10-CM

## 2025-06-13 RX ORDER — PHENAZOPYRIDINE HYDROCHLORIDE 100 MG/1
100 TABLET, FILM COATED ORAL 3 TIMES DAILY PRN
Qty: 9 TABLET | Refills: 0 | Status: SHIPPED | OUTPATIENT
Start: 2025-06-13 | End: 2025-06-16

## 2025-06-13 NOTE — TELEPHONE ENCOUNTER
Copied from CRM #[id number]. Topic: General Inquiry - Patient Advice  >> Jun 13, 2025 10:36 AM Radha wrote:  .Type:  Needs Medical Advice    Who Called: pt  Symptoms (please be specific): Bladder spasms   How long has patient had these symptoms:  Today  Pharm[facility] and [facility]   Would the patient rather a call back or a response via [facility]?   Best Call Back Number: [phone]   Additional Information: Please call something in for bladder spasms. Please call back other medications that she is on and so she doesn't mix and med she can not take together.

## 2025-06-16 ENCOUNTER — OFFICE VISIT (OUTPATIENT)
Dept: INTERNAL MEDICINE | Facility: CLINIC | Age: 70
End: 2025-06-16
Payer: MEDICARE

## 2025-06-16 ENCOUNTER — TELEPHONE (OUTPATIENT)
Dept: INTERNAL MEDICINE | Facility: CLINIC | Age: 70
End: 2025-06-16
Payer: MEDICARE

## 2025-06-16 VITALS
WEIGHT: 166 LBS | OXYGEN SATURATION: 97 % | HEART RATE: 78 BPM | DIASTOLIC BLOOD PRESSURE: 64 MMHG | SYSTOLIC BLOOD PRESSURE: 136 MMHG | BODY MASS INDEX: 32.59 KG/M2 | HEIGHT: 60 IN | RESPIRATION RATE: 16 BRPM

## 2025-06-16 DIAGNOSIS — Z09 HOSPITAL DISCHARGE FOLLOW-UP: Primary | ICD-10-CM

## 2025-06-16 DIAGNOSIS — N30.00 ACUTE CYSTITIS WITHOUT HEMATURIA: ICD-10-CM

## 2025-06-16 DIAGNOSIS — N32.89 BLADDER SPASMS: ICD-10-CM

## 2025-06-16 PROCEDURE — 1160F RVW MEDS BY RX/DR IN RCRD: CPT | Mod: CPTII,95,, | Performed by: NURSE PRACTITIONER

## 2025-06-16 PROCEDURE — 1159F MED LIST DOCD IN RCRD: CPT | Mod: CPTII,95,, | Performed by: NURSE PRACTITIONER

## 2025-06-16 PROCEDURE — 3078F DIAST BP <80 MM HG: CPT | Mod: CPTII,95,, | Performed by: NURSE PRACTITIONER

## 2025-06-16 PROCEDURE — 3288F FALL RISK ASSESSMENT DOCD: CPT | Mod: CPTII,95,, | Performed by: NURSE PRACTITIONER

## 2025-06-16 PROCEDURE — 3075F SYST BP GE 130 - 139MM HG: CPT | Mod: CPTII,95,, | Performed by: NURSE PRACTITIONER

## 2025-06-16 PROCEDURE — 1101F PT FALLS ASSESS-DOCD LE1/YR: CPT | Mod: CPTII,95,, | Performed by: NURSE PRACTITIONER

## 2025-06-16 PROCEDURE — 4010F ACE/ARB THERAPY RXD/TAKEN: CPT | Mod: CPTII,95,, | Performed by: NURSE PRACTITIONER

## 2025-06-16 PROCEDURE — 3044F HG A1C LEVEL LT 7.0%: CPT | Mod: CPTII,95,, | Performed by: NURSE PRACTITIONER

## 2025-06-16 PROCEDURE — 98006 SYNCH AUDIO-VIDEO EST MOD 30: CPT | Mod: 95,,, | Performed by: NURSE PRACTITIONER

## 2025-06-16 PROCEDURE — 1125F AMNT PAIN NOTED PAIN PRSNT: CPT | Mod: CPTII,95,, | Performed by: NURSE PRACTITIONER

## 2025-06-16 RX ORDER — METHOCARBAMOL 500 MG/1
500 TABLET, FILM COATED ORAL EVERY 8 HOURS
COMMUNITY
End: 2025-06-18 | Stop reason: SDUPTHER

## 2025-06-16 RX ORDER — ASPIRIN 81 MG/1
81 TABLET ORAL DAILY
COMMUNITY

## 2025-06-16 RX ORDER — NITROFURANTOIN 25; 75 MG/1; MG/1
100 CAPSULE ORAL 2 TIMES DAILY
Qty: 14 CAPSULE | Refills: 0 | Status: SHIPPED | OUTPATIENT
Start: 2025-06-16 | End: 2025-06-23

## 2025-06-16 RX ORDER — PHENAZOPYRIDINE HYDROCHLORIDE 100 MG/1
100 TABLET, FILM COATED ORAL 3 TIMES DAILY PRN
Qty: 9 TABLET | Refills: 0 | Status: SHIPPED | OUTPATIENT
Start: 2025-06-16 | End: 2025-06-19

## 2025-06-16 NOTE — TELEPHONE ENCOUNTER
OV but I see she recently needed a hospital f/u but d/t her condition she cannot walk, maybe virtual with NP?

## 2025-06-16 NOTE — TELEPHONE ENCOUNTER
Spoke with pt and advised she needed a virtual visit with NP, voiced understanding. Transferred to ext [phone] to schedule appt.

## 2025-06-16 NOTE — PROGRESS NOTES
"  Patient ID: [id number]     Chief Complaint: Pain (Patient reports that she had a UTI a few weeks ago and was on antibiotics on and off while she was in the hospital. Patient is having bladder spasms after she is emptying her bladder. Patient states that "it feels like a knife in my vagina")      HPI:   The patient location is: her home  The chief complaint leading to consultation is: bladder spasms/pain    Visit type: audiovisual    Face to Face time with patient: 17  30 minutes of total time spent on the encounter, which includes face to face time and non-face to face time preparing to see the patient (eg, review of tests), Obtaining and/or reviewing separately obtained history, Documenting clinical information in the electronic or other health record, Independently interpreting results (not separately reported) and communicating results to the patient/family/caregiver, or Care coordination (not separately reported).         Each patient to whom he or she provides medical services by telemedicine is:  (1) informed of the relationship between the physician and patient and the respective role of any other health care provider with respect to management of the patient; and (2) notified that he or she may decline to receive medical services by telemedicine and may withdraw from such care at any time.    Transitional Care Note    Family and/or Caretaker present at visit?  No.  Diagnostic tests reviewed/disposition: No diagnosic tests pending after this hospitalization.  Disease/illness education: Yes  Home health/community services discussion/referrals: Patient has home health established at [facility].   Establishment or re-establishment of referral orders for community resources: No other necessary community resources.   Discussion with other health care providers: No discussion with other health care providers necessary.          Varsha Rg is a 70 y.o. female here today for a telemedicine visit. She was recently " hospitalized         -------------------------------------    Allergy    Years ago    Anxiety and depression    Arthritis    Benign paroxysmal vertigo, bilateral    Chronic back pain    Clotting disorder    BLOOD CLOTS IN LEGS    Concussion    Deep vein thrombosis    9 day hospital stay both legs    Degenerative disc disease    DVT (deep venous thrombosis)    Dyspareunia    Encounter for blood transfusion    2 pints for each surgery    Fibromyalgia    GERD (gastroesophageal reflux disease)    History of syncope    Hypercholesteremia    Hypertension    IBS (irritable bowel syndrome)    Lymphedema    Osteoporosis    Personal history of colonic polyps    Skin cancer    SOB (shortness of breath)    Systemic lupus erythematosus, organ or system involvement unspecified    Thyroid disease    Ulcer    Unspecified cataract    Unspecified osteoarthritis, unspecified site        Past Surgical History:   Procedure Laterality Date    ABDOMINAL SURGERY      Appendectomy    ADENOIDECTOMY      Can't remember    APPENDECTOMY      CARPAL TUNNEL RELEASE      CATARACT EXTRACTION Right 2022    CATARACT EXTRACTION Left 2022     SECTION      CHOLECYSTECTOMY      COLONOSCOPY  2015    DEBRIDEMENT, SKIN, FASCIA, MUSCLE, AND BONE, OPEN FRACTURE SITE Left 05/10/2025    Procedure: DEBRIDEMENT, SKIN, FASCIA, MUSCLE, AND BONE, OPEN FRACTURE SITE;  Surgeon: Brent Purcell DO;  Location: [facility];  Service: Orthopedics;  Laterality: Left;    EXCISION OF SQUAMOUS CELL CARCINOMA      EYE SURGERY      FOOT FRACTURE SURGERY      GASTRIC BYPASS      Sleeve    HIATAL HERNIA REPAIR  2022    Dr. gould    HYSTERECTOMY      JOINT REPLACEMENT  2017    Knee    KNEE SURGERY      LAPAROSCOPIC SLEEVE GASTRECTOMY  2022    Dr. Gould    ORIF, FRACTURE, FEMUR, TRANSCONDYLAR WITH INTERCONDYLAR EXTENSION Left 05/10/2025    Procedure: ORIF, FRACTURE, FEMUR, TRANSCONDYLAR WITH INTERCONDYLAR EXTENSION;  Surgeon:  Brent Purcell DO;  Location: [facility] OR;  Service: Orthopedics;  Laterality: Left;    RECONSTRUCTION OF SHOULDER      REPAIR, MUSCLE, QUADRICEPS OR HAMSTRING; Left 05/10/2025    Procedure: REPAIR,MUSCLE,QUADRICEPS OR HAMSTRING;  Surgeon: Brent Purcell DO;  Location[facility];  Service: Orthopedics;  Laterality: Left;    RETROGRADE INTRAMEDULLARY RODDING OF DISTAL FEMUR Left 05/10/2025    Procedure: INSERTION, INTRAMEDULLARY STANISLAW, FEMUR, DISTAL, RETROGRADE;  Surgeon: Brent Purcell DO;  Location: [facility];  Service: Orthopedics;  Laterality: Left;  supine nicho traction triangles, c arm synthes retrograde LAW, wash stuff    TONSILLECTOMY      TRIGGER FINGER RELEASE      WRIST FRACTURE SURGERY         Review of patient's allergies indicates:   Allergen Reactions    Azithromycin Shortness Of Breath    Hydrocodone-acetaminophen Hallucinations    Pcn [penicillins]     Rosuvastatin     Levofloxacin Palpitations       Outpatient Medications Marked as Taking for the 6/16/25 encounter (Office Visit) with Ursula Willis NP   Medication Sig Dispense Refill    aspirin 81 mg Tab Take 81 mg by mouth once daily.      clonazePAM (KLONOPIN) 2 MG Tab Take 2 tablets (4 mg total) by mouth every evening. 60 tablet 0    ferrous sulfate 325 (65 FE) MG EC tablet Take 1 tablet (325 mg total) by mouth once daily. 30 tablet 0    methocarbamoL (ROBAXIN) 500 MG Tab Take 500 mg by mouth every 8 (eight) hours.      multivitamin-min-iron-FA-vit K (BARIATRIC MULTIVITAMINS) 45 mg iron- 800 mcg-120 mcg Cap Take 1 tablet by mouth once daily. 30 capsule 0    polyethylene glycol 3350 (MIRALAX ORAL) Take by mouth.      [DISCONTINUED] phenazopyridine (PYRIDIUM) 100 MG tablet Take 1 tablet (100 mg total) by mouth 3 (three) times daily as needed for Pain. 9 tablet 0       Social History[1]     Family History   Problem Relation Name Age of Onset    Hypertension Mother Zoraida Marquez     Stroke Mother Zoraida Marquez     Hearing loss Mother Zoraida Marquez      Osteoarthritis Mother Zoraida Marquez     Heart disease Father Bar Marquez         CONGESTIVE HEART FAILURE    Arthritis Father Bar Marquez     Heart failure Father Bar Marquez     Cancer Brother DARIUS AND SANDIE MARQUEZ         LIVER  AND PANCREATIC    Early death Brother DARIUS AND SANDIE MARQUEZ     Depression Sister GIUSEPPE MARQUEZ         ALCOHOLIC AND DRUG USE    Alcohol abuse Sister GIUSEPPE MARQUEZ     Alcohol abuse Brother DARIUS MARQUEZ     Kidney disease Brother DARIUS MARQUEZ     Birth defects Son BLANCO Rg     Drug abuse Sister GIUSEPPE CAMEJO     Miscarriages / Stillbirths Daughter TIKA ALVES         Patient Care Team:  Amrit Vogt II, MD as PCP - General (Internal Medicine)  Nomi Olivares MD as Consulting Physician (Gastroenterology)  Jake Elmore MD as Surgeon (Bariatrics)  Armando Jacques MD (Cardiovascular Disease)  Blanco Parnell MD (Orthopedic Surgery)  Dana Trinh (Inactive)      Subjective:       Review of Systems   Genitourinary:  Positive for dysuria, frequency and urgency.     Answers submitted by the patient for this visit:  Painful Urination Questionnaire (Submitted on 6/16/2025)  Chief Complaint: Dysuria  Chronicity: recurrent  Onset: more than 1 month ago  Frequency: every urination  Progression since onset: rapidly worsening  Pain quality: stabbing  Pain - numeric: 10/10  Fever: no fever  Sexually active?: No  History of pyelonephritis?: No  discharge: Yes  withholding: Yes  Treatments tried: acetaminophen, antibiotics  Improvement on treatment: no relief  Pain severity: severe  catheterization: Yes  diabetes insipidus: No  diabetes mellitus: No  genitourinary reflux: No  hypertension: No  recurrent UTIs: Yes  single kidney: No  STD: No  urinary stasis: No  urological procedure: No  kidney stones: No    See HPI for details    Constitutional: Denies Change in appetite. Denies Chills. Denies Fever. Denies Night sweats.  Eye: Denies  Blurred vision. Denies Discharge. Denies Eye pain.  ENT: Denies Decreased hearing. Denies Sore throat. Denies Swollen glands.  Respiratory: Denies Cough. Denies Shortness of breath. Denies Shortness of breath with exertion. Denies Wheezing.  Cardiovascular: DeniesChest pain at rest. Denies Chest pain with exertion. Denies Irregular heartbeat. Denies Palpitations.  Gastrointestinal: Denies Abdominal pain. DeniesDiarrhea. Denies Nausea. Denies Vomiting. Denies Hematemesis or Hematochezia.  Genitourinary: Denies Dysuria. Denies Urinary frequency. Denies Urinary urgency. Denies Blood in urine.  Endocrine: Denies Cold intolerance. Denies Excessive thirst. Denies Heat intolerance. Denies Weight loss. Denies Weight gain.  Musculoskeletal: Denies Painful joints. Denies Weakness.  Integumentary: Denies Rash. Denies Itching. Denies Dry skin.  Neurologic: Denies Dizziness. Denies Fainting. Denies Headache.  Psychiatric: Denies Depression. Denies Anxiety. Denies Suicidal/Homicidal ideations.    All Other ROS: Negative except as stated in HPI.       Objective:     /64 (BP Location: Left arm, Patient Position: Sitting)   Pulse 78   Resp 16   Ht 5' (1.524 m)   Wt 75.3 kg (166 lb)   SpO2 97%   BMI 32.42 kg/m²     Physical Exam    Physical Exam: LIMITED DUE TO TELEMEDICINE RESTRICTIONS.  General: Alert and oriented, No acute distress.  Head: Normocephalic, Atraumatic.  Eye: Sclera non-icteric.  Neck/Thyroid:  Full range of motion.  Respiratory: Non-labored respirations, Symmetrical chest wall expansion.  Musculoskeletal: Normal range of motion.  Integumentary: Warm, Dry, Intact, No visible suspicious lesions or rashes. No diaphoresis.   Neurologic: No focal deficits  Psychiatric: Normal interaction, Coherent speech, Euthymic mood, Appropriate affect       Assessment:       ICD-10-CM ICD-9-CM   1. Hospital discharge follow-up  Z09 V67.59   2. Bladder spasms  N32.89 596.89   3. Acute cystitis without hematuria  N30.00  595.0        Plan:     1. Hospital discharge follow-up  Assessment & Plan:  Personally reviewed hospital records, diagnostic studies, and discharge summary       2. Bladder spasms  Assessment & Plan:  RX Pyridium    Orders:  -     Urinalysis ($); Future; Expected date: 06/16/2025  -     phenazopyridine (PYRIDIUM) 100 MG tablet; Take 1 tablet (100 mg total) by mouth 3 (three) times daily as needed for Pain.  Dispense: 9 tablet; Refill: 0  -     nitrofurantoin, macrocrystal-monohydrate, (MACROBID) 100 MG capsule; Take 1 capsule (100 mg total) by mouth 2 (two) times daily. for 7 days  Dispense: 14 capsule; Refill: 0  -     Cancel: US Pelvis Complete Non OB; Future; Expected date: 06/16/2025  -     CT Abdomen Pelvis With IV Contrast NO Oral Contrast; Future; Expected date: 06/16/2025  -     Creatinine, serum; Future; Expected date: 06/16/2025    3. Acute cystitis without hematuria  Assessment & Plan:  Will re-collect UA per   RX sent for Macrobid follow review of UA with [facility] last month  Inc fluids  ER precautions    Orders:  -     Urinalysis ($); Future; Expected date: 06/16/2025  -     phenazopyridine (PYRIDIUM) 100 MG tablet; Take 1 tablet (100 mg total) by mouth 3 (three) times daily as needed for Pain.  Dispense: 9 tablet; Refill: 0  -     nitrofurantoin, macrocrystal-monohydrate, (MACROBID) 100 MG capsule; Take 1 capsule (100 mg total) by mouth 2 (two) times daily. for 7 days  Dispense: 14 capsule; Refill: 0  -     Cancel: US Pelvis Complete Non OB; Future; Expected date: 06/16/2025  -     CT Abdomen Pelvis With IV Contrast NO Oral Contrast; Future; Expected date: 06/16/2025  -     Creatinine, serum; Future; Expected date: 06/16/2025           Medication List with Changes/Refills   New Medications    NITROFURANTOIN, MACROCRYSTAL-MONOHYDRATE, (MACROBID) 100 MG CAPSULE    Take 1 capsule (100 mg total) by mouth 2 (two) times daily. for 7 days       Start Date: 6/16/2025 End Date: 6/23/2025   Current Medications     ASPIRIN 81 MG TAB    Take 81 mg by mouth once daily.       Start Date: --        End Date: --    CLONAZEPAM (KLONOPIN) 2 MG TAB    Take 2 tablets (4 mg total) by mouth every evening.       Start Date: 6/4/2025  End Date: 7/4/2025    FERROUS SULFATE 325 (65 FE) MG EC TABLET    Take 1 tablet (325 mg total) by mouth once daily.       Start Date: 6/4/2025  End Date: 7/4/2025    METHOCARBAMOL (ROBAXIN) 500 MG TAB    Take 500 mg by mouth every 8 (eight) hours.       Start Date: --        End Date: --    MULTIVITAMIN-MIN-IRON-FA-VIT K (BARIATRIC MULTIVITAMINS) 45 MG IRON- 800 MCG-120 MCG CAP    Take 1 tablet by mouth once daily.       Start Date: 6/5/2025  End Date: 7/5/2025    POLYETHYLENE GLYCOL 3350 (MIRALAX ORAL)    Take by mouth.       Start Date: --        End Date: --   Changed and/or Refilled Medications    Modified Medication Previous Medication    PHENAZOPYRIDINE (PYRIDIUM) 100 MG TABLET phenazopyridine (PYRIDIUM) 100 MG tablet       Take 1 tablet (100 mg total) by mouth 3 (three) times daily as needed for Pain.    Take 1 tablet (100 mg total) by mouth 3 (three) times daily as needed for Pain.       Start Date: 6/16/2025 End Date: 6/19/2025    Start Date: 6/13/2025 End Date: 6/16/2025   Discontinued Medications    DOCUSATE SODIUM (COLACE) 100 MG CAPSULE    Take 1 capsule (100 mg total) by mouth once daily.       Start Date: 6/5/2025  End Date: 6/16/2025          Follow up if symptoms worsen or fail to improve. In addition to their scheduled follow up, the patient has also been instructed to follow up on as needed basis.       Video Time Documentation:  Spent 10 minutes with patient face to face discussed health concerns. More than 50% of this time was spent in counseling and coordination of care.         [1]   Social History  Socioeconomic History    Marital status:    Tobacco Use    Smoking status: Never    Smokeless tobacco: Never    Tobacco comments:     Never   Substance and Sexual Activity    Alcohol  use: Not Currently     Comment: None in 3 yrs    Drug use: Never    Sexual activity: Not Currently     Partners: Male     Birth control/protection: Condom, Diaphragm, I.U.D., Post-menopausal     Social Drivers of Health     Financial Resource Strain: Low Risk  (5/20/2025)    Overall Financial Resource Strain (CARDIA)     Difficulty of Paying Living Expenses: Not hard at all   Food Insecurity: No Food Insecurity (5/20/2025)    Hunger Vital Sign     Worried About Running Out of Food in the Last Year: Never true     Ran Out of Food in the Last Year: Never true   Transportation Needs: No Transportation Needs (5/20/2025)    PRAPARE - Transportation     Lack of Transportation (Medical): No     Lack of Transportation (Non-Medical): No   Physical Activity: Inactive (5/20/2025)    Exercise Vital Sign     Days of Exercise per Week: 0 days     Minutes of Exercise per Session: 50 min   Stress: No Stress Concern Present (5/20/2025)    [employer] [place] [employer] - Occupational Stress Questionnaire     Feeling of Stress : Only a little   Housing Stability: Low Risk  (5/20/2025)    Housing Stability Vital Sign     Unable to Pay for Housing in the Last Year: No     Number of Times Moved in the Last Year: 0     Homeless in the Last Year: No

## 2025-06-16 NOTE — TELEPHONE ENCOUNTER
Copied from CRM #[id number]. Topic: General Inquiry - Patient Advice  >> Jun 16, 2025 11:12 AM Yogesh wrote:  Who Called: Varsah Rg    Caller is requesting assistance/information from provider's office.    Symptoms (please be specific): N/A   How long has patient had these symptoms:  N/A  List of preferred pharmacies on file (remove unneeded): [unfilled]  If different, enter pharmacy into here including location and phone number: N/A    Preferred Method of Contact: Phone Call    Patient's Preferred Phone Number on File: [phone]     Best Call Back Number, if different:    Additional Information: Pt called requesting a cb from nurse to discuss a possible urology referral. Please advise, thank you.

## 2025-06-16 NOTE — TELEPHONE ENCOUNTER
Pt called stating she is in a lot of pain with bladder spasms. Has been taking Pyridium but it's not helping. States it feels like she has a knife going up her vagina. She is requesting a referral to a urologist. She doesn't want to see Dr Flynn. Please advise

## 2025-06-16 NOTE — ASSESSMENT & PLAN NOTE
Will re-collect UA per HH  RX sent for Macrobid follow review of UA with [facility] last month  Inc fluids  ER precautions

## 2025-06-17 ENCOUNTER — TELEPHONE (OUTPATIENT)
Dept: INTERNAL MEDICINE | Facility: CLINIC | Age: 70
End: 2025-06-17
Payer: MEDICARE

## 2025-06-17 NOTE — TELEPHONE ENCOUNTER
Copied from CRM #[id number]. Topic: Appointments - Amb Referral  >> Jun 17, 2025  9:41 AM Landry wrote:  .Who Called: Varsha Rg    Caller is requesting assistance/information from provider's office.    Symptoms (please be specific):    How long has patient had these symptoms:    List of preferred pharmacies on file (remove unneeded):   If different, enter pharmacy into here including location and phone number:       Preferred Method of Contact: Phone Call  Patient's Preferred Phone Number on File: [phone]   Best Call Back Number, if different:  Additional Information:  patient called demanding to speak with office

## 2025-06-18 ENCOUNTER — TELEPHONE (OUTPATIENT)
Dept: INTERNAL MEDICINE | Facility: CLINIC | Age: 70
End: 2025-06-18
Payer: MEDICARE

## 2025-06-18 DIAGNOSIS — M62.838 MUSCLE SPASM: Primary | ICD-10-CM

## 2025-06-18 RX ORDER — METHOCARBAMOL 500 MG/1
500 TABLET, FILM COATED ORAL 3 TIMES DAILY
Qty: 30 TABLET | Refills: 0 | Status: SHIPPED | OUTPATIENT
Start: 2025-06-18

## 2025-06-18 NOTE — TELEPHONE ENCOUNTER
Copied from CRM #[id number]. Topic: General Inquiry - Patient Advice  >> Jun 18, 2025  1:59 PM Rianna wrote:  [facility]    Caller is requesting assistance/information from provider's office.    Symptoms (please be specific): n/a   How long has patient had these symptoms:  n/a  List of preferred pharmacies on file (remove unneeded): n/a    Preferred Method of Contact: Phone Call  Patient's Preferred Phone Number on File: [phone]   Best Call Back Number, if different:[phone]  Additional Information: abnormal urinalysis.

## 2025-06-18 NOTE — TELEPHONE ENCOUNTER
Copied from CRM #[id number]. Topic: General Inquiry - Patient Advice  >> Jun 18, 2025  4:01 PM Yogesh wrote:  .Who Called: Varsha Rg    Caller is requesting assistance/information from provider's office.    Symptoms (please be specific): N/A   How long has patient had these symptoms: N/A    List of preferred pharmacies on file (remove unneeded): [address]   Phone: [phone]  Fax: [phone]    Preferred Method of Contact: Phone Call    Patient's Preferred Phone Number on File: [phone]     Best Call Back Number, if different: [phone] ([place])    Additional Information: Called stating she's been communicating with KAYLAN Maddox and would like to speak with her again regarding pt having muscle spasms. Says pt is  asking her to request a refill on methocarbamoL (ROBAXIN) 500 MG Tab. Lisha advise, thank you.

## 2025-06-18 NOTE — TELEPHONE ENCOUNTER
Pt notified of results and recommendations, voiced understanding. Please review message below regarding refill on Robaxin. Please advise

## 2025-06-19 ENCOUNTER — RESULTS FOLLOW-UP (OUTPATIENT)
Dept: INTERNAL MEDICINE | Facility: CLINIC | Age: 70
End: 2025-06-19

## 2025-06-19 ENCOUNTER — DOCUMENTATION ONLY (OUTPATIENT)
Dept: INTERNAL MEDICINE | Facility: CLINIC | Age: 70
End: 2025-06-19
Payer: MEDICARE

## 2025-06-23 ENCOUNTER — TELEPHONE (OUTPATIENT)
Dept: INTERNAL MEDICINE | Facility: CLINIC | Age: 70
End: 2025-06-23
Payer: MEDICARE

## 2025-06-23 NOTE — TELEPHONE ENCOUNTER
Copied from CRM #[id number]. Topic: General Inquiry - Patient Advice  >> Jun 23, 2025  9:36 AM Landry wrote:  .Who Called: [facility]     Caller is requesting assistance/information from provider's office.    Symptoms (please be specific): positive Urine Culture   How long has patient had these symptoms:    List of preferred pharmacies on file (remove unneeded): [unfilled]  If different, enter pharmacy into here including location and phone number:       Preferred Method of Contact: Phone Call  Patient's Preferred Phone Number on File: [phone]   Best Call Back Number, if different: [phone]  Additional Information: called requesting to speak with nurse

## 2025-06-25 ENCOUNTER — TELEPHONE (OUTPATIENT)
Dept: INTERNAL MEDICINE | Facility: CLINIC | Age: 70
End: 2025-06-25
Payer: MEDICARE

## 2025-06-25 DIAGNOSIS — R30.0 DYSURIA: Primary | ICD-10-CM

## 2025-06-25 NOTE — TELEPHONE ENCOUNTER
Additional Information: pt wants nurse to give her a call regarding a possible UTI. She needs medication     Macrobid was sent to pharmacy on 06/16/25    Spoke to patient, she advised me that today is her last day of antibiotics, still experiencing burning when urinating.  Advised patient that Repeat UA Order sent to Home Health to collect.  Once resulted we can go from there on further antibiotic Request    Patient verbalized understanding

## 2025-06-25 NOTE — TELEPHONE ENCOUNTER
Copied from CRM #[id number]. Topic: General Inquiry - Patient Advice  >> Jun 25, 2025 10:01 AM Bre wrote:  .Who Called: Varsha Rg    Caller is requesting assistance/information from provider's office.    Symptoms (please be specific): na   How long has patient had these symptoms:  na  List of preferred pharmacies on file (remove unneeded): [unfilled]  If different, enter pharmacy into here including location and phone number: na      Preferred Method of Contact: Phone Call  Patient's Preferred Phone Number on File: [phone]   Best Call Back Number, if different:  Additional Information: pt wants nurse to give her a call regarding a possible UTI. She needs medication

## 2025-06-27 ENCOUNTER — TELEPHONE (OUTPATIENT)
Dept: INTERNAL MEDICINE | Facility: CLINIC | Age: 70
End: 2025-06-27
Payer: MEDICARE

## 2025-06-27 DIAGNOSIS — N30.00 ACUTE CYSTITIS WITHOUT HEMATURIA: Primary | ICD-10-CM

## 2025-06-27 RX ORDER — PHENAZOPYRIDINE HYDROCHLORIDE 100 MG/1
100 TABLET, FILM COATED ORAL 3 TIMES DAILY PRN
Qty: 20 TABLET | Refills: 0 | Status: SHIPPED | OUTPATIENT
Start: 2025-06-27

## 2025-06-27 RX ORDER — NITROFURANTOIN 25; 75 MG/1; MG/1
100 CAPSULE ORAL 2 TIMES DAILY
Qty: 14 CAPSULE | Refills: 0 | Status: SHIPPED | OUTPATIENT
Start: 2025-06-27 | End: 2025-07-04

## 2025-06-27 NOTE — TELEPHONE ENCOUNTER
Copied from CRM #[id number]. Topic: General Inquiry - Patient Advice  >> Jun 27, 2025  9:24 AM Danuta wrote:  [facility]    Caller is requesting assistance/information from provider's office.    Symptoms (please be specific):    How long has patient had these symptoms:    List of preferred pharmacies on file (remove unneeded): [unfilled]  If different, enter pharmacy into here including location and phone number:       Preferred Method of Contact: Phone Call  Patient's Preferred Phone Number on File: [phone] [facility] Call Back Number, if different:  Additional Information: Home health nurse called to report a positive urinalysis

## 2025-06-27 NOTE — TELEPHONE ENCOUNTER
Please let pt know that I will send another round of Macrobid while we await [facility] for guidance (advise this may take 72 hr). Inc water. Refilled Pyridium

## 2025-06-28 ENCOUNTER — LAB REQUISITION (OUTPATIENT)
Dept: LAB | Facility: HOSPITAL | Age: 70
End: 2025-06-28
Payer: MEDICARE

## 2025-06-28 DIAGNOSIS — N39.0 URINARY TRACT INFECTION, SITE NOT SPECIFIED: ICD-10-CM

## 2025-06-28 PROCEDURE — 87077 CULTURE AEROBIC IDENTIFY: CPT | Performed by: OTOLARYNGOLOGY

## 2025-06-30 LAB — BACTERIA UR CULT: ABNORMAL

## 2025-07-01 ENCOUNTER — TELEPHONE (OUTPATIENT)
Dept: INTERNAL MEDICINE | Facility: CLINIC | Age: 70
End: 2025-07-01
Payer: MEDICARE

## 2025-07-01 DIAGNOSIS — E61.1 IRON DEFICIENCY: Primary | ICD-10-CM

## 2025-07-01 NOTE — TELEPHONE ENCOUNTER
Copied from CRM #[id number]. Topic: Medications - Medication Question  >> Jul 1, 2025  1:33 PM Sangita wrote:  Who Called: Varsha Rg    Caller is requesting assistance/information from provider's office.    Symptoms (please be specific):   How long has patient had these symptoms:    List of preferred pharmacies on file (remove unneeded): [unfilled]  If different, enter pharmacy into here including location and phone number:       Preferred Method of Contact: Phone Call  Patient's Preferred Phone Number on File: [phone]   Best Call Back Number, if different:  Additional Information: Patient called requesting a callback about medication list. Please advise

## 2025-07-01 NOTE — TELEPHONE ENCOUNTER
Pt states she was discharged from the hospital with Ferrous Sulfate 325 and wants to know if she needs to continue taking it. Please advise

## 2025-07-15 ENCOUNTER — HOSPITAL ENCOUNTER (OUTPATIENT)
Dept: RADIOLOGY | Facility: CLINIC | Age: 70
Discharge: HOME | End: 2025-07-15
Attending: ORTHOPAEDIC SURGERY
Payer: MEDICARE

## 2025-07-15 ENCOUNTER — OFFICE VISIT (OUTPATIENT)
Dept: ORTHOPEDICS | Facility: CLINIC | Age: 70
End: 2025-07-15
Payer: MEDICARE

## 2025-07-15 VITALS — HEART RATE: 77 BPM | DIASTOLIC BLOOD PRESSURE: 78 MMHG | SYSTOLIC BLOOD PRESSURE: 114 MMHG

## 2025-07-15 DIAGNOSIS — S72.402E TYPE I OR II OPEN FRACTURE OF DISTAL END OF LEFT FEMUR WITH ROUTINE HEALING, UNSPECIFIED FRACTURE MORPHOLOGY, SUBSEQUENT ENCOUNTER: Primary | ICD-10-CM

## 2025-07-15 DIAGNOSIS — S72.402E TYPE I OR II OPEN FRACTURE OF DISTAL END OF LEFT FEMUR WITH ROUTINE HEALING, UNSPECIFIED FRACTURE MORPHOLOGY, SUBSEQUENT ENCOUNTER: ICD-10-CM

## 2025-07-15 PROCEDURE — 99024 POSTOP FOLLOW-UP VISIT: CPT | Mod: ,,, | Performed by: ORTHOPAEDIC SURGERY

## 2025-07-15 PROCEDURE — 3078F DIAST BP <80 MM HG: CPT | Mod: CPTII,,, | Performed by: ORTHOPAEDIC SURGERY

## 2025-07-15 PROCEDURE — 1101F PT FALLS ASSESS-DOCD LE1/YR: CPT | Mod: CPTII,,, | Performed by: ORTHOPAEDIC SURGERY

## 2025-07-15 PROCEDURE — 1159F MED LIST DOCD IN RCRD: CPT | Mod: CPTII,,, | Performed by: ORTHOPAEDIC SURGERY

## 2025-07-15 PROCEDURE — 73552 X-RAY EXAM OF FEMUR 2/>: CPT | Mod: LT,,, | Performed by: ORTHOPAEDIC SURGERY

## 2025-07-15 PROCEDURE — 3288F FALL RISK ASSESSMENT DOCD: CPT | Mod: CPTII,,, | Performed by: ORTHOPAEDIC SURGERY

## 2025-07-15 PROCEDURE — 4010F ACE/ARB THERAPY RXD/TAKEN: CPT | Mod: CPTII,,, | Performed by: ORTHOPAEDIC SURGERY

## 2025-07-15 PROCEDURE — 3074F SYST BP LT 130 MM HG: CPT | Mod: CPTII,,, | Performed by: ORTHOPAEDIC SURGERY

## 2025-07-15 PROCEDURE — 3044F HG A1C LEVEL LT 7.0%: CPT | Mod: CPTII,,, | Performed by: ORTHOPAEDIC SURGERY

## 2025-07-15 NOTE — PROGRESS NOTES
Subjective:       Patient ID: Varsha Rg is a 70 y.o. female.  Chief Complaint   Patient presents with    Left Femur - Follow-up     ORIF LEFT DISTAL FEMUR FX sx-05/10/25-08/08/25. NWB in a wheelchair. Reports mild pain, some swelling in the ankle. Home health PT and OT twice a week.         Follow-up      History of Present Illness    HPI:  Ms. Rg presents for follow-up of a leg injury. She reports adherence to non-weight-bearing instructions on the affected leg, stating she has not engaged in any weight-bearing activities. She sleeps with her knee bent but has been assured this would not cause the observed changes.    Her surgical history is significant, including 34 surgeries for various conditions: appendectomy, cholecystectomy, hysterectomy, sinus surgery, shoulder reconstruction, and skin cancer treatment. She underwent gastric sleeve surgery for weight loss prior to a planned knee replacement, which was subsequently cancelled 3 times due to symptom improvement.    She currently takes daily vitamins (Bariatric Life) following her gastric sleeve surgery. She denies having undergone chemotherapy for skin cancer.    IMAGING:  The XR Leg reveals that the bone has not yet begun the healing process. There is a concerning observation that the bone appears to be shifting.    MEDICATIONS:  Ms. Rg is on Bariatric Life, taken daily.      ROS:  Musculoskeletal: denies joint pain, +muscle weakness  Neurological: +weakness           ROS:  Constitutional: Denies fever chills  Eyes: No change in vision  ENT: No ringing or current infections  CV: No chest pain  Resp: No labored breathing  MSK: Pain evident at site of injury located in HPI,   Integ: No signs of abrasions or lacerations  Neuro: No numbness or tingling  Lymphatic: No swelling outside the area of injury     Medications Ordered Prior to Encounter[1]       Objective:      /78 (BP Location: Right arm, Patient Position: Sitting)   Pulse 77  "  Physical Exam  General the patient is alert and oriented x3 no acute distress nontoxic-appearing appropriate affect.    Constitutional: Vital signs are examined and stable.  Resp: No signs of labored breathing                   LLE: -Skin:  Obese,  Incisions healing well without erythema, drainage, signs of dehiscence.             -MSK: Hip and Knee F/E, EHL/FHL, Gastroc/Tib anterior Strength 5/5; tolerates passive range of motion of the knee            -Neuro:  Sensation intact to light touch L3-S1 dermatomes           -Lymphatic: No signs of lymphadenopathy           -CV: Capillary refill is less than 2 seconds. DP/PT pulses 2/4.           There is no height or weight on file to calculate BMI.  Patient weight not recorded  Hemoglobin A1c   Date Value Ref Range Status   02/04/2025 4.9 <=7.0 % Final   11/21/2024 4.8 <=7.0 % Final     Hgb   Date Value Ref Range Status   06/01/2025 10.1 (L) 12.0 - 16.0 g/dL Final   05/26/2025 10.2 (L) 12.0 - 16.0 g/dL Final     Hct   Date Value Ref Range Status   06/01/2025 33.8 (L) 37.0 - 47.0 % Final   05/26/2025 32.4 (L) 37.0 - 47.0 % Final     Iron Level   Date Value Ref Range Status   05/26/2025 45 (L) 50 - 170 ug/dL Final   02/04/2025 68 50 - 170 ug/dL Final     No components found for: "FROLATE"  No results found for: "KYQLAECG33GN"  WBC   Date Value Ref Range Status   06/01/2025 8.90 4.50 - 11.50 x10(3)/mcL Final   05/26/2025 9.36 4.50 - 11.50 x10(3)/mcL Final       Radiology:  Three-view x-ray left femur reveal hardware intact without signs of loosening or failure.  Nonunion evident, mild collapse of fragment        Assessment:         1. Type I or II open fracture of distal end of left femur with routine healing, unspecified fracture morphology, subsequent encounter  X-Ray Femur 2 View Left    Ambulatory Referral/Consult to Physical Therapy              Plan:         No follow-ups on file.    Varsha Rogers" was seen today for follow-up.    Diagnoses and all orders for " this visit:    Type I or II open fracture of distal end of left femur with routine healing, unspecified fracture morphology, subsequent encounter  -     X-Ray Femur 2 View Left; Future  -     Ambulatory Referral/Consult to Physical Therapy; Future      Assessment & Plan    PLAN SUMMARY:  - Remain non-weight bearing on affected lower extremity  - Avoid activities that put pressure on affected lower extremity, including resting foot on floor while sitting  - Potential bone stimulator prescription after 3 months if healing is inadequate  - Possible non-union lab workup after 3 months if needed  - Potential future procedure: distal femoral replacement if bone does not heal  - Follow-up in mid-August (3-month virginie) for reassessment and potential bone stimulator prescription    FOLLOW UP:  - Follow up in mid-August (at 3-month virginie) for reassessment and potential bone stimulator prescription.    PROCEDURES:  - Potential future procedure: distal femoral replacement if bone does not heal. Involves replacing all broken bones. Significant procedure, should not be rushed.  - Potential use of bone stimulator after 3 months if healing is inadequate. Involves skin stickers to stimulate bone healing.  - Possibility of non-union lab workup after 3 months if needed.    PATIENT INSTRUCTIONS:  - Remain non-weight bearing until next visit in mid-August.  - Avoid any activities that put pressure on the affected lower extremity, including resting foot on floor while sitting.              Patient has a mild collapse of the fracture.  She has a gastric sleeve.  She is on vitamins.  She has a history of cancer multiple comorbidities.  I am concerned that she may need a distal femoral replacement.  Discussed this with her again in during this visit.  We will have her come back in 4-5 weeks to start working her up for nonunion bone stimulator and labs.            This note was generated with the assistance of ambient listening technology.  Verbal consent was obtained by the patient and accompanying visitor(s) for the recording of patient appointment to facilitate this note. I attest to having reviewed and edited the generated note for accuracy, though some syntax or spelling errors may persist. Please contact the author of this note for any clarification.         This note/OR report was created with the assistance of  voice recognition software or phone  dictation.  There may be transcription errors as a result of using this technology however minimal. Effort has been made to assure accuracy of transcription but any obvious errors or omissions should be clarified with the author of the document.       Brent Purcell DO  Orthopedic Trauma Surgery            Future Appointments   Date Time Provider Department [place]   8/20/2025  9:45 AM [facility]                      [1]   Current Outpatient Medications on File Prior to Visit   Medication Sig Dispense Refill    aspirin 81 mg Tab Take 81 mg by mouth once daily.      phenazopyridine (PYRIDIUM) 100 MG tablet Take 1 tablet (100 mg total) by mouth 3 (three) times daily as needed for Pain. 20 tablet 0    polyethylene glycol 3350 (MIRALAX ORAL) Take by mouth.      clonazePAM (KLONOPIN) 2 MG Tab Take 2 tablets (4 mg total) by mouth every evening. 60 tablet 0    methocarbamoL (ROBAXIN) 500 MG Tab Take 1 tablet (500 mg total) by mouth 3 (three) times daily. 30 tablet 0     No current facility-administered medications on file prior to visit.

## 2025-07-16 ENCOUNTER — TELEPHONE (OUTPATIENT)
Dept: INTERNAL MEDICINE | Facility: CLINIC | Age: 70
End: 2025-07-16
Payer: MEDICARE

## 2025-07-16 NOTE — TELEPHONE ENCOUNTER
Copied from CRM #[id number]. Topic: General Inquiry - Patient Advice  >> Jul 16, 2025  9:37 AM Rianna wrote:  Who Called: Varsha DOTTIE PozoArcenio    Caller is requesting information on test results.    Name of Test (Lab/Mammo/Etc): X-Ray Femur 2 View Left  Date of Test: 7/15/25  Where the test was performed: [facility]  Ordering Provider: Amrit Vogt II, MD        Preferred Method of Contact: Phone Call  Patient's Preferred Phone Number on File: [phone]   Best Call Back Number, if different:  Additional Information:  Pt would like to get back on D3 and calcium.

## 2025-07-23 ENCOUNTER — TELEPHONE (OUTPATIENT)
Dept: INTERNAL MEDICINE | Facility: CLINIC | Age: 70
End: 2025-07-23
Payer: MEDICARE

## 2025-07-23 DIAGNOSIS — R42 DIZZINESS: Primary | ICD-10-CM

## 2025-07-23 RX ORDER — MECLIZINE HYDROCHLORIDE 25 MG/1
25 TABLET ORAL 3 TIMES DAILY PRN
Qty: 20 TABLET | Refills: 0 | Status: SHIPPED | OUTPATIENT
Start: 2025-07-23

## 2025-07-23 NOTE — TELEPHONE ENCOUNTER
Copied from CRM #[id number]. Topic: General Inquiry - Patient Advice  >> Jul 23, 2025  9:28 AM Yogesh wrote:  .Who Called: Varsha Rg    Caller is requesting assistance/information from provider's office.    Symptoms (please be specific): N/A   How long has patient had these symptoms:  N/A  List of preferred pharmacies on file (remove unneeded): [unfilled]  If different, enter pharmacy into here including location and phone number: N/A    Preferred Method of Contact: Phone Call    Patient's Preferred Phone Number on File: [phone]     Best Call Back Number, if different:    Additional Information: Pt requesting a cb from nurse. Pt did not get into detail regarding message. Please advise, thank you.   No

## 2025-08-14 ENCOUNTER — PATIENT OUTREACH (OUTPATIENT)
Dept: ADMINISTRATIVE | Facility: HOSPITAL | Age: 70
End: 2025-08-14
Payer: MEDICARE

## 2025-08-14 DIAGNOSIS — Z12.31 ENCOUNTER FOR SCREENING MAMMOGRAM FOR BREAST CANCER: Primary | ICD-10-CM

## 2025-08-20 ENCOUNTER — OFFICE VISIT (OUTPATIENT)
Dept: ORTHOPEDICS | Facility: CLINIC | Age: 70
End: 2025-08-20
Payer: MEDICARE

## 2025-08-20 ENCOUNTER — TELEPHONE (OUTPATIENT)
Dept: INTERNAL MEDICINE | Facility: CLINIC | Age: 70
End: 2025-08-20
Payer: MEDICARE

## 2025-08-20 ENCOUNTER — HOSPITAL ENCOUNTER (OUTPATIENT)
Dept: RADIOLOGY | Facility: CLINIC | Age: 70
Discharge: HOME | End: 2025-08-20
Attending: ORTHOPAEDIC SURGERY
Payer: MEDICARE

## 2025-08-20 ENCOUNTER — LAB VISIT (OUTPATIENT)
Dept: LAB | Facility: HOSPITAL | Age: 70
End: 2025-08-20
Attending: ORTHOPAEDIC SURGERY
Payer: MEDICARE

## 2025-08-20 VITALS
SYSTOLIC BLOOD PRESSURE: 126 MMHG | WEIGHT: 166 LBS | HEIGHT: 60 IN | DIASTOLIC BLOOD PRESSURE: 54 MMHG | HEART RATE: 70 BPM | BODY MASS INDEX: 32.59 KG/M2

## 2025-08-20 DIAGNOSIS — S72.402E TYPE I OR II OPEN FRACTURE OF DISTAL END OF LEFT FEMUR WITH ROUTINE HEALING, UNSPECIFIED FRACTURE MORPHOLOGY, SUBSEQUENT ENCOUNTER: ICD-10-CM

## 2025-08-20 DIAGNOSIS — R93.89 ABNORMAL FINDINGS ON DIAGNOSTIC IMAGING OF OTHER SPECIFIED BODY STRUCTURES: ICD-10-CM

## 2025-08-20 DIAGNOSIS — E55.9 VITAMIN D DEFICIENCY: ICD-10-CM

## 2025-08-20 DIAGNOSIS — S72.492M: Primary | ICD-10-CM

## 2025-08-20 DIAGNOSIS — S72.492M: ICD-10-CM

## 2025-08-20 LAB
25(OH)D3+25(OH)D2 SERPL-MCNC: 55 NG/ML (ref 30–80)
ALBUMIN SERPL-MCNC: 3.4 G/DL (ref 3.4–4.8)
ALBUMIN/GLOB SERPL: 0.8 RATIO (ref 1.1–2)
ALP SERPL-CCNC: 114 UNIT/L (ref 40–150)
ALT SERPL-CCNC: 13 UNIT/L (ref 0–55)
ANION GAP SERPL CALC-SCNC: 8 MEQ/L
AST SERPL-CCNC: 19 UNIT/L (ref 11–45)
BASOPHILS # BLD AUTO: 0.08 X10(3)/MCL
BASOPHILS NFR BLD AUTO: 0.9 %
BILIRUB SERPL-MCNC: 0.5 MG/DL
BUN SERPL-MCNC: 13.8 MG/DL (ref 9.8–20.1)
CALCIUM SERPL-MCNC: 9.4 MG/DL (ref 8.4–10.2)
CHLORIDE SERPL-SCNC: 107 MMOL/L (ref 98–107)
CO2 SERPL-SCNC: 30 MMOL/L (ref 23–31)
CREAT SERPL-MCNC: 0.72 MG/DL (ref 0.55–1.02)
CREAT/UREA NIT SERPL: 19
CRP SERPL-MCNC: 9.1 MG/L
EOSINOPHIL # BLD AUTO: 0.11 X10(3)/MCL (ref 0–0.9)
EOSINOPHIL NFR BLD AUTO: 1.2 %
ERYTHROCYTE [DISTWIDTH] IN BLOOD BY AUTOMATED COUNT: 13.3 % (ref 11.5–17)
ERYTHROCYTE [SEDIMENTATION RATE] IN BLOOD: 22 MM/HR (ref 0–20)
GFR SERPLBLD CREATININE-BSD FMLA CKD-EPI: >60 ML/MIN/1.73/M2
GLOBULIN SER-MCNC: 4.2 GM/DL (ref 2.4–3.5)
GLUCOSE SERPL-MCNC: 95 MG/DL (ref 82–115)
HCT VFR BLD AUTO: 41 % (ref 37–47)
HGB BLD-MCNC: 13.2 G/DL (ref 12–16)
IMM GRANULOCYTES # BLD AUTO: 0.02 X10(3)/MCL (ref 0–0.04)
IMM GRANULOCYTES NFR BLD AUTO: 0.2 %
LYMPHOCYTES # BLD AUTO: 3.13 X10(3)/MCL (ref 0.6–4.6)
LYMPHOCYTES NFR BLD AUTO: 33.9 %
MCH RBC QN AUTO: 29.3 PG (ref 27–31)
MCHC RBC AUTO-ENTMCNC: 32.2 G/DL (ref 33–36)
MCV RBC AUTO: 90.9 FL (ref 80–94)
MONOCYTES # BLD AUTO: 0.67 X10(3)/MCL (ref 0.1–1.3)
MONOCYTES NFR BLD AUTO: 7.3 %
NEUTROPHILS # BLD AUTO: 5.21 X10(3)/MCL (ref 2.1–9.2)
NEUTROPHILS NFR BLD AUTO: 56.5 %
NRBC BLD AUTO-RTO: 0 %
PLATELET # BLD AUTO: 276 X10(3)/MCL (ref 130–400)
PMV BLD AUTO: 10.4 FL (ref 7.4–10.4)
POTASSIUM SERPL-SCNC: 4.2 MMOL/L (ref 3.5–5.1)
PREALB SERPL-MCNC: 24.7 MG/DL (ref 14–37)
PROT SERPL-MCNC: 7.6 GM/DL (ref 5.8–7.6)
PTH-INTACT SERPL-MCNC: 26.2 PG/ML (ref 8.7–77)
RBC # BLD AUTO: 4.51 X10(6)/MCL (ref 4.2–5.4)
SODIUM SERPL-SCNC: 145 MMOL/L (ref 136–145)
TSH SERPL-ACNC: 4.27 UIU/ML (ref 0.35–4.94)
WBC # BLD AUTO: 9.22 X10(3)/MCL (ref 4.5–11.5)

## 2025-08-20 PROCEDURE — 80053 COMPREHEN METABOLIC PANEL: CPT

## 2025-08-20 PROCEDURE — 85025 COMPLETE CBC W/AUTO DIFF WBC: CPT

## 2025-08-20 PROCEDURE — 73552 X-RAY EXAM OF FEMUR 2/>: CPT | Mod: LT,,, | Performed by: ORTHOPAEDIC SURGERY

## 2025-08-20 PROCEDURE — 83970 ASSAY OF PARATHORMONE: CPT

## 2025-08-20 PROCEDURE — 82306 VITAMIN D 25 HYDROXY: CPT

## 2025-08-20 PROCEDURE — 1101F PT FALLS ASSESS-DOCD LE1/YR: CPT | Mod: CPTII,,, | Performed by: ORTHOPAEDIC SURGERY

## 2025-08-20 PROCEDURE — 3074F SYST BP LT 130 MM HG: CPT | Mod: CPTII,,, | Performed by: ORTHOPAEDIC SURGERY

## 2025-08-20 PROCEDURE — 84134 ASSAY OF PREALBUMIN: CPT

## 2025-08-20 PROCEDURE — 3078F DIAST BP <80 MM HG: CPT | Mod: CPTII,,, | Performed by: ORTHOPAEDIC SURGERY

## 2025-08-20 PROCEDURE — 84443 ASSAY THYROID STIM HORMONE: CPT

## 2025-08-20 PROCEDURE — 85652 RBC SED RATE AUTOMATED: CPT

## 2025-08-20 PROCEDURE — 86140 C-REACTIVE PROTEIN: CPT

## 2025-08-20 PROCEDURE — 99214 OFFICE O/P EST MOD 30 MIN: CPT | Mod: ,,, | Performed by: ORTHOPAEDIC SURGERY

## 2025-08-20 PROCEDURE — 36415 COLL VENOUS BLD VENIPUNCTURE: CPT

## 2025-08-20 PROCEDURE — 1159F MED LIST DOCD IN RCRD: CPT | Mod: CPTII,,, | Performed by: ORTHOPAEDIC SURGERY

## 2025-08-20 PROCEDURE — 4010F ACE/ARB THERAPY RXD/TAKEN: CPT | Mod: CPTII,,, | Performed by: ORTHOPAEDIC SURGERY

## 2025-08-20 PROCEDURE — 3008F BODY MASS INDEX DOCD: CPT | Mod: CPTII,,, | Performed by: ORTHOPAEDIC SURGERY

## 2025-08-20 PROCEDURE — 3044F HG A1C LEVEL LT 7.0%: CPT | Mod: CPTII,,, | Performed by: ORTHOPAEDIC SURGERY

## 2025-08-20 PROCEDURE — 3288F FALL RISK ASSESSMENT DOCD: CPT | Mod: CPTII,,, | Performed by: ORTHOPAEDIC SURGERY

## 2025-08-21 ENCOUNTER — TELEPHONE (OUTPATIENT)
Dept: ORTHOPEDICS | Facility: CLINIC | Age: 70
End: 2025-08-21
Payer: MEDICARE

## 2025-08-21 ENCOUNTER — EXTERNAL HOME HEALTH (OUTPATIENT)
Dept: HOME HEALTH SERVICES | Facility: HOSPITAL | Age: 70
End: 2025-08-21
Payer: MEDICARE

## 2025-08-25 ENCOUNTER — TELEPHONE (OUTPATIENT)
Dept: ORTHOPEDICS | Facility: CLINIC | Age: 70
End: 2025-08-25
Payer: MEDICARE

## 2025-08-28 ENCOUNTER — LAB VISIT (OUTPATIENT)
Dept: LAB | Facility: HOSPITAL | Age: 70
End: 2025-08-28
Attending: INTERNAL MEDICINE
Payer: MEDICARE

## 2025-08-28 ENCOUNTER — TELEPHONE (OUTPATIENT)
Dept: INTERNAL MEDICINE | Facility: CLINIC | Age: 70
End: 2025-08-28
Payer: MEDICARE

## 2025-08-28 DIAGNOSIS — R30.0 DYSURIA: Primary | ICD-10-CM

## 2025-08-28 DIAGNOSIS — R31.9 URINARY TRACT INFECTION WITH HEMATURIA, SITE UNSPECIFIED: Primary | ICD-10-CM

## 2025-08-28 DIAGNOSIS — R30.0 DYSURIA: ICD-10-CM

## 2025-08-28 DIAGNOSIS — N39.0 URINARY TRACT INFECTION WITH HEMATURIA, SITE UNSPECIFIED: Primary | ICD-10-CM

## 2025-08-28 LAB
BACTERIA #/AREA URNS AUTO: ABNORMAL /HPF
BILIRUB UR QL STRIP.AUTO: ABNORMAL
CLARITY UR: CLEAR
COLOR UR AUTO: ABNORMAL
GLUCOSE UR QL STRIP: 100
HGB UR QL STRIP: ABNORMAL
KETONES UR QL STRIP: ABNORMAL
LEUKOCYTE ESTERASE UR QL STRIP: ABNORMAL
NITRITE UR QL STRIP: POSITIVE
PH UR STRIP: 5.5 [PH]
PROT UR QL STRIP: ABNORMAL
RBC #/AREA URNS AUTO: ABNORMAL /HPF
SP GR UR STRIP.AUTO: 1.02 (ref 1–1.03)
SQUAMOUS #/AREA URNS AUTO: ABNORMAL /HPF
UROBILINOGEN UR STRIP-ACNC: 2
WBC #/AREA URNS AUTO: ABNORMAL /HPF

## 2025-08-28 PROCEDURE — 81003 URINALYSIS AUTO W/O SCOPE: CPT

## 2025-08-28 RX ORDER — NITROFURANTOIN 25; 75 MG/1; MG/1
100 CAPSULE ORAL 2 TIMES DAILY
Qty: 14 CAPSULE | Refills: 0 | Status: SHIPPED | OUTPATIENT
Start: 2025-08-28 | End: 2025-09-04

## (undated) DEVICE — ELECTRODE REM POLYHESIVE II

## (undated) DEVICE — COVER EQUIPMENT 36X25

## (undated) DEVICE — DRAPE ORTH SPLIT 77X108IN

## (undated) DEVICE — COVER TABLE HVY DTY 60X90IN

## (undated) DEVICE — DRAPE STERI U-SHAPED 47X51IN

## (undated) DEVICE — GLOVE SIGNATURE MICRO LTX 6

## (undated) DEVICE — WIRE GUIDE 3.2MM 400MM
Type: IMPLANTABLE DEVICE | Site: LEG | Status: NON-FUNCTIONAL
Removed: 2025-05-10

## (undated) DEVICE — STAPLER SKIN PROXIMATE WIDE

## (undated) DEVICE — BIT DRILL 4.2MM 3 FLUTD 145MM

## (undated) DEVICE — COVER FULLGUARD SHOE HIGH-TOP

## (undated) DEVICE — BIT DRLL CALIB 110MM 2.8X200MM

## (undated) DEVICE — DRESSING WND GZ 10X4X8X2IN

## (undated) DEVICE — COVER MAYO STND XL 30X57IN

## (undated) DEVICE — KIT SURGICAL TURNOVER

## (undated) DEVICE — ELECTRODE PATIENT RETURN DISP

## (undated) DEVICE — SOL NACL IRR 1000ML BTL

## (undated) DEVICE — IMPLANTABLE DEVICE
Type: IMPLANTABLE DEVICE | Site: LEG | Status: NON-FUNCTIONAL
Removed: 2025-05-10

## (undated) DEVICE — DRAPE FULL SHEET 70X100IN

## (undated) DEVICE — SUT VICRYL PLUS ANTIBACT

## (undated) DEVICE — IRRIGATION SET Y-TYPE TUR/BLAD

## (undated) DEVICE — BIT DRILL XLN 4.2MM

## (undated) DEVICE — GLOVE PROTEXIS NEU-THERA SZ6

## (undated) DEVICE — APPLICATOR CHLORAPREP ORN 26ML

## (undated) DEVICE — DRAPE C-ARMOR EQUIPMENT COVER

## (undated) DEVICE — TAPE SILK 3IN

## (undated) DEVICE — GLOVE PROTEXIS HYDROGEL SZ9

## (undated) DEVICE — PAD ABDOMINAL STERILE 8X10IN

## (undated) DEVICE — SUT MCRYL PLUS 2-0 CT-1 36IN

## (undated) DEVICE — GLOVE PROTEXIS BLUE LATEX 9

## (undated) DEVICE — GOWN SMARTGOWN 3XL XLONG

## (undated) DEVICE — Device